# Patient Record
Sex: MALE | Race: WHITE | Employment: UNEMPLOYED | URBAN - METROPOLITAN AREA
[De-identification: names, ages, dates, MRNs, and addresses within clinical notes are randomized per-mention and may not be internally consistent; named-entity substitution may affect disease eponyms.]

---

## 2017-10-11 PROBLEM — I50.33 ACUTE ON CHRONIC DIASTOLIC HEART FAILURE (HCC): Status: ACTIVE | Noted: 2017-10-11

## 2018-06-20 ENCOUNTER — OFFICE VISIT (OUTPATIENT)
Dept: CARDIOLOGY CLINIC | Age: 68
End: 2018-06-20
Payer: COMMERCIAL

## 2018-06-20 VITALS
BODY MASS INDEX: 31.22 KG/M2 | RESPIRATION RATE: 16 BRPM | HEART RATE: 69 BPM | DIASTOLIC BLOOD PRESSURE: 78 MMHG | SYSTOLIC BLOOD PRESSURE: 134 MMHG | WEIGHT: 223 LBS | HEIGHT: 71 IN

## 2018-06-20 DIAGNOSIS — I50.33 ACUTE ON CHRONIC DIASTOLIC HEART FAILURE (HCC): ICD-10-CM

## 2018-06-20 DIAGNOSIS — Z95.1 HISTORY OF CORONARY ARTERY BYPASS SURGERY: ICD-10-CM

## 2018-06-20 DIAGNOSIS — I25.10 CORONARY ARTERY DISEASE INVOLVING NATIVE CORONARY ARTERY OF NATIVE HEART WITHOUT ANGINA PECTORIS: Primary | ICD-10-CM

## 2018-06-20 PROCEDURE — 93000 ELECTROCARDIOGRAM COMPLETE: CPT | Performed by: INTERNAL MEDICINE

## 2018-06-20 PROCEDURE — 99213 OFFICE O/P EST LOW 20 MIN: CPT | Performed by: INTERNAL MEDICINE

## 2018-06-20 RX ORDER — METOPROLOL SUCCINATE 25 MG/1
25 TABLET, EXTENDED RELEASE ORAL DAILY
Qty: 30 TABLET | Refills: 5
Start: 2018-06-20 | End: 2019-07-01 | Stop reason: SDUPTHER

## 2019-03-15 LAB
CHOLESTEROL, TOTAL: 134 MG/DL
CHOLESTEROL/HDL RATIO: 4.6
HDLC SERPL-MCNC: 29 MG/DL (ref 35–70)
LDL CHOLESTEROL CALCULATED: 78 MG/DL (ref 0–160)
TRIGL SERPL-MCNC: 177 MG/DL
VLDLC SERPL CALC-MCNC: 104 MG/DL

## 2019-06-10 RX ORDER — FERROUS SULFATE 325(65) MG
325 TABLET ORAL EVERY OTHER DAY
COMMUNITY
End: 2021-06-03

## 2019-06-10 RX ORDER — QUETIAPINE FUMARATE 25 MG/1
25 TABLET, FILM COATED ORAL DAILY PRN
COMMUNITY
End: 2019-07-01 | Stop reason: SDUPTHER

## 2019-06-10 RX ORDER — ASCORBIC ACID 500 MG
500 TABLET ORAL DAILY
COMMUNITY

## 2019-06-24 ENCOUNTER — PROCEDURE VISIT (OUTPATIENT)
Dept: PODIATRY | Age: 69
End: 2019-06-24
Payer: COMMERCIAL

## 2019-06-24 VITALS — SYSTOLIC BLOOD PRESSURE: 145 MMHG | DIASTOLIC BLOOD PRESSURE: 85 MMHG | TEMPERATURE: 97.4 F

## 2019-06-24 DIAGNOSIS — M79.675 PAIN IN LEFT TOE(S): ICD-10-CM

## 2019-06-24 DIAGNOSIS — E11.9 TYPE 2 DIABETES MELLITUS WITHOUT COMPLICATION, WITH LONG-TERM CURRENT USE OF INSULIN (HCC): ICD-10-CM

## 2019-06-24 DIAGNOSIS — I73.9 PERIPHERAL VASCULAR DISEASE, UNSPECIFIED (HCC): ICD-10-CM

## 2019-06-24 DIAGNOSIS — Z79.4 TYPE 2 DIABETES MELLITUS WITHOUT COMPLICATION, WITH LONG-TERM CURRENT USE OF INSULIN (HCC): ICD-10-CM

## 2019-06-24 DIAGNOSIS — M79.674 PAIN IN TOE OF RIGHT FOOT: ICD-10-CM

## 2019-06-24 DIAGNOSIS — B35.1 TINEA UNGUIUM: Primary | ICD-10-CM

## 2019-06-24 PROCEDURE — 11721 DEBRIDE NAIL 6 OR MORE: CPT | Performed by: PODIATRIST

## 2019-06-24 ASSESSMENT — ENCOUNTER SYMPTOMS
EYES NEGATIVE: 1
RESPIRATORY NEGATIVE: 1

## 2019-06-24 NOTE — PROGRESS NOTES
[address] [place] [address]  Dept: [phone]  Dept Fax: [phone]    DIABETIC NAIL PROGRESS NOTE  Date of patient's visit: 6/24/2019  Patient's Name:  Regan Escobedo YOB: 1950            Patient Care Team:  Cesar Olivares DO as PCP - General (Family Medicine)  Cesar Olivares DO as PCP - [facility] Empaneled Provider          Chief Complaint   Patient presents with    Diabetes     dm nail care saw PCP Dr. Lai Aj on 3/14/2019       Subjective: Regan Escobedo comes to clinic for Diabetes (dm nail care saw PCP Dr. Lai Aj on 3/14/2019)    he is a diabetic and states that diabetic foot exam .  Pt currently has complaint of thickened, elongated nails that they cannot manage by themselves. Pt's primary care physician is Cesar Olivares DO last seen   . Lab Results   Component Value Date    LABA1C 7.3 (H) 11/19/2016      Complains of numbness in the feet bilat.   Past Medical History:   Diagnosis Date    CAD (coronary artery disease)     Diabetes mellitus ([facility] [address]     GERD (gastroesophageal reflux disease) 11/18/2016    Hyperlipidemia     Hypertension     Obesity        Allergies   Allergen Reactions    Cleocin [Clindamycin Phosphate]      c.diff    Cymbalta [Duloxetine Hcl]     Lactose Intolerance (Gi)      Current Outpatient Medications on File Prior to Visit   Medication Sig Dispense Refill    Dulaglutide (TRULICITY) 1.5 DAMION/0.7QT SOPN Inject 1.5 mg into the skin      QUEtiapine (SEROQUEL) 25 MG tablet Take 25 mg by mouth daily as needed      vitamin C (ASCORBIC ACID) 500 MG tablet Take 500 mg by mouth daily      ferrous sulfate 325 (65 Fe) MG tablet Take 325 mg by mouth every other day      metoprolol succinate (TOPROL XL) 25 MG extended release tablet Take 1 tablet by mouth daily 30 tablet 5    Insulin Aspart (NOVOLOG SC) Inject into the skin      loratadine (CLARITIN) 10 MG tablet Take 10 mg by mouth      pantoprazole (PROTONIX) 40 MG tablet Take 40 mg by mouth      atorvastatin (LIPITOR) 40 MG tablet Take 1 tablet by mouth daily 30 tablet 5    amLODIPine (NORVASC) 5 MG tablet Take 1 tablet by mouth daily 30 tablet 5    LEVEMIR FLEXTOUCH 100 UNIT/ML injection pen Inject into the skin nightly       aspirin 81 MG chewable tablet Take 81 mg by mouth daily      insulin glargine (LANTUS) 100 UNIT/ML injection vial Inject 35 Units into the skin daily      LYRICA 75 MG capsule   0    pregabalin (LYRICA) 50 MG capsule Take 50 mg by mouth 2 times daily       No current facility-administered medications on file prior to visit. Review of Systems   Constitutional: Negative. HENT: Negative. Eyes: Negative. Respiratory: Negative. Cardiovascular: Negative. Endocrine: Negative. Genitourinary: Negative. Review of Systems:   History obtained from chart review and the patient  General ROS: negative for - chills, fatigue, fever, night sweats or weight gain  Constitutional: Negative for chills, diaphoresis, fatigue, fever and unexpected weight change. Musculoskeletal: Positive for arthralgias, gait problem and joint swelling. Neurological ROS: negative for - behavioral changes, confusion, headaches or seizures. Negative for weakness and numbness. Dermatological ROS: negative for - mole changes, rash  Cardiovascular: Negative for leg swelling. Gastrointestinal: Negative for constipation, diarrhea, nausea and vomiting. Objective:  General: AAO x 3 in NAD.     Derm  Toenail Description nails are thick and mycotic yellow incurvated causing pain with shoe gear  Sites of Onychomycosis Involvement (Check affected area)  [x] [x] [x] [x] [x] [x] [x] [x] [x] [x]  5 4 3 2 1 1 2 3 4 5                          Right                                        Left    Thickness  [x] [x] [x] [x] [x] [x] [x] [x] [x] [x]  5 4 3 2 1 1 2 3 4 5                         Right alert and oriented to person, place, and time       Right Foot/Ankle     Inspection and Palpation  Skin Exam: skin changes and abnormal color; (There is loss of hair to lower extremity mild discoloration to the lower extremity coolness to touch to the toes nails are thick and yellow dystrophic)    Neurovascular  Dorsalis pedis: 2+  Posterior tibial: absent      Left Foot/Ankle      Inspection and Palpation  Skin Exam: skin changes; Neurovascular  Dorsalis pedis: 2+  Posterior tibial: absent             Ortho Exam      Assessment:  68 y.o. male with:  1. Tinea unguium    2. Type 2 diabetes mellitus without complication, with long-term current use of insulin (HCC)    3. Pain in left toe(s)    4. Pain in toe of right foot    5. Peripheral vascular disease, unspecified ([facility] [address]     No orders of the defined types were placed in this encounter. Q7   []Yes    []No                Q8   [x]Yes    []No                     Q9   []Yes    []No    Plan:   Pt was evaluated and examined. Patient was given personalized discharge instructions. Nails 1-10 were debrided sharply in length and thickness with a nipper and , without incident. Pt will follow up in 3 months or sooner if any problems arise. Diagnosis was discussed with the pt and all of their questions were answered in detail. Proper foot hygiene and care was discussed with the pt. Informed patient on proper diabetic foot care and importance of tight glycemic control. Patient to check feet daily and contact the office with any questions/problems/concerns.    Other comorbidity noted and will be managed by PCP.  6/24/2019    Electronically signed by Demetri Lopes DPM on 6/24/2019 at 2:42 PM  6/24/2019

## 2019-07-01 ENCOUNTER — OFFICE VISIT (OUTPATIENT)
Dept: FAMILY MEDICINE CLINIC | Age: 69
End: 2019-07-01
Payer: COMMERCIAL

## 2019-07-01 ENCOUNTER — HOSPITAL ENCOUNTER (OUTPATIENT)
Age: 69
Discharge: HOME | End: 2019-07-03
Payer: COMMERCIAL

## 2019-07-01 VITALS
HEART RATE: 72 BPM | TEMPERATURE: 98.5 F | OXYGEN SATURATION: 95 % | HEIGHT: 71 IN | BODY MASS INDEX: 31.5 KG/M2 | SYSTOLIC BLOOD PRESSURE: 134 MMHG | WEIGHT: 225 LBS | DIASTOLIC BLOOD PRESSURE: 80 MMHG

## 2019-07-01 DIAGNOSIS — Z79.4 TYPE 2 DIABETES MELLITUS WITH HYPERGLYCEMIA, WITH LONG-TERM CURRENT USE OF INSULIN (HCC): Primary | ICD-10-CM

## 2019-07-01 DIAGNOSIS — R19.7 DIARRHEA OF PRESUMED INFECTIOUS ORIGIN: ICD-10-CM

## 2019-07-01 DIAGNOSIS — E11.65 TYPE 2 DIABETES MELLITUS WITH HYPERGLYCEMIA, WITH LONG-TERM CURRENT USE OF INSULIN (HCC): Primary | ICD-10-CM

## 2019-07-01 DIAGNOSIS — Z79.4 TYPE 2 DIABETES MELLITUS WITH DIABETIC POLYNEUROPATHY, WITH LONG-TERM CURRENT USE OF INSULIN (HCC): ICD-10-CM

## 2019-07-01 DIAGNOSIS — E11.42 TYPE 2 DIABETES MELLITUS WITH DIABETIC POLYNEUROPATHY, WITH LONG-TERM CURRENT USE OF INSULIN (HCC): ICD-10-CM

## 2019-07-01 LAB
ALBUMIN SERPL-MCNC: 4.4 G/DL (ref 3.5–5.2)
ALP BLD-CCNC: 88 U/L (ref 40–129)
ALT SERPL-CCNC: 25 U/L (ref 0–40)
ANION GAP SERPL CALCULATED.3IONS-SCNC: 17 MMOL/L (ref 7–16)
AST SERPL-CCNC: 19 U/L (ref 0–39)
BASOPHILS ABSOLUTE: 0.08 E9/L (ref 0–0.2)
BASOPHILS RELATIVE PERCENT: 1 % (ref 0–2)
BILIRUB SERPL-MCNC: 0.3 MG/DL (ref 0–1.2)
BUN BLDV-MCNC: 19 MG/DL (ref 8–23)
CALCIUM SERPL-MCNC: 9.8 MG/DL (ref 8.6–10.2)
CHLORIDE BLD-SCNC: 104 MMOL/L (ref 98–107)
CO2: 22 MMOL/L (ref 22–29)
CREAT SERPL-MCNC: 1.9 MG/DL (ref 0.7–1.2)
CREATININE URINE: 225 MG/DL (ref 40–278)
EOSINOPHILS ABSOLUTE: 0.35 E9/L (ref 0.05–0.5)
EOSINOPHILS RELATIVE PERCENT: 4.4 % (ref 0–6)
GFR AFRICAN AMERICAN: 42
GFR NON-AFRICAN AMERICAN: 35 ML/MIN/1.73
GLUCOSE BLD-MCNC: 212 MG/DL (ref 74–99)
HBA1C MFR BLD: 9.4 % (ref 4–5.6)
HCT VFR BLD CALC: 47.3 % (ref 37–54)
HEMOGLOBIN: 15 G/DL (ref 12.5–16.5)
IMMATURE GRANULOCYTES #: 0.03 E9/L
IMMATURE GRANULOCYTES %: 0.4 % (ref 0–5)
LYMPHOCYTES ABSOLUTE: 1.69 E9/L (ref 1.5–4)
LYMPHOCYTES RELATIVE PERCENT: 21.4 % (ref 20–42)
MCH RBC QN AUTO: 31.3 PG (ref 26–35)
MCHC RBC AUTO-ENTMCNC: 31.7 % (ref 32–34.5)
MCV RBC AUTO: 98.5 FL (ref 80–99.9)
MICROALBUMIN UR-MCNC: 63.1 MG/L
MICROALBUMIN/CREAT UR-RTO: 28 (ref 0–30)
MONOCYTES ABSOLUTE: 0.62 E9/L (ref 0.1–0.95)
MONOCYTES RELATIVE PERCENT: 7.9 % (ref 2–12)
NEUTROPHILS ABSOLUTE: 5.11 E9/L (ref 1.8–7.3)
NEUTROPHILS RELATIVE PERCENT: 64.9 % (ref 43–80)
PDW BLD-RTO: 13.2 FL (ref 11.5–15)
PLATELET # BLD: 252 E9/L (ref 130–450)
PMV BLD AUTO: 11.1 FL (ref 7–12)
POTASSIUM SERPL-SCNC: 5.9 MMOL/L (ref 3.5–5)
RBC # BLD: 4.8 E12/L (ref 3.8–5.8)
SODIUM BLD-SCNC: 143 MMOL/L (ref 132–146)
TOTAL PROTEIN: 8 G/DL (ref 6.4–8.3)
WBC # BLD: 7.9 E9/L (ref 4.5–11.5)

## 2019-07-01 PROCEDURE — 85025 COMPLETE CBC W/AUTO DIFF WBC: CPT

## 2019-07-01 PROCEDURE — 87045 FECES CULTURE AEROBIC BACT: CPT

## 2019-07-01 PROCEDURE — 82044 UR ALBUMIN SEMIQUANTITATIVE: CPT

## 2019-07-01 PROCEDURE — 80053 COMPREHEN METABOLIC PANEL: CPT

## 2019-07-01 PROCEDURE — 82570 ASSAY OF URINE CREATININE: CPT

## 2019-07-01 PROCEDURE — 36415 COLL VENOUS BLD VENIPUNCTURE: CPT

## 2019-07-01 PROCEDURE — 83036 HEMOGLOBIN GLYCOSYLATED A1C: CPT

## 2019-07-01 PROCEDURE — 99214 OFFICE O/P EST MOD 30 MIN: CPT | Performed by: FAMILY MEDICINE

## 2019-07-01 RX ORDER — AMLODIPINE BESYLATE 5 MG/1
5 TABLET ORAL DAILY
Qty: 90 TABLET | Refills: 1 | Status: SHIPPED | OUTPATIENT
Start: 2019-07-01 | End: 2019-12-23 | Stop reason: SDUPTHER

## 2019-07-01 RX ORDER — QUETIAPINE FUMARATE 25 MG/1
25 TABLET, FILM COATED ORAL DAILY PRN
Qty: 90 TABLET | Refills: 1 | Status: SHIPPED | OUTPATIENT
Start: 2019-07-01 | End: 2019-12-23 | Stop reason: SDUPTHER

## 2019-07-01 RX ORDER — PANTOPRAZOLE SODIUM 40 MG/1
40 TABLET, DELAYED RELEASE ORAL DAILY
Qty: 90 TABLET | Refills: 1 | Status: SHIPPED | OUTPATIENT
Start: 2019-07-01 | End: 2019-12-23 | Stop reason: SDUPTHER

## 2019-07-01 RX ORDER — METOPROLOL SUCCINATE 25 MG/1
25 TABLET, EXTENDED RELEASE ORAL DAILY
Qty: 90 TABLET | Refills: 1 | Status: SHIPPED | OUTPATIENT
Start: 2019-07-01 | End: 2019-12-23 | Stop reason: SDUPTHER

## 2019-07-01 RX ORDER — ATORVASTATIN CALCIUM 40 MG/1
40 TABLET, FILM COATED ORAL DAILY
Qty: 90 TABLET | Refills: 1 | Status: SHIPPED | OUTPATIENT
Start: 2019-07-01 | End: 2019-12-23 | Stop reason: SDUPTHER

## 2019-07-03 LAB — CULTURE, STOOL: NORMAL

## 2019-08-26 ENCOUNTER — PROCEDURE VISIT (OUTPATIENT)
Dept: PODIATRY | Age: 69
End: 2019-08-26
Payer: COMMERCIAL

## 2019-08-26 VITALS
DIASTOLIC BLOOD PRESSURE: 84 MMHG | WEIGHT: 224 LBS | HEIGHT: 71 IN | SYSTOLIC BLOOD PRESSURE: 144 MMHG | TEMPERATURE: 97.4 F | BODY MASS INDEX: 31.36 KG/M2

## 2019-08-26 DIAGNOSIS — I73.9 PERIPHERAL VASCULAR DISEASE, UNSPECIFIED (HCC): ICD-10-CM

## 2019-08-26 DIAGNOSIS — M79.674 PAIN IN TOE OF RIGHT FOOT: ICD-10-CM

## 2019-08-26 DIAGNOSIS — M79.675 PAIN IN LEFT TOE(S): ICD-10-CM

## 2019-08-26 DIAGNOSIS — E11.9 TYPE 2 DIABETES MELLITUS WITHOUT COMPLICATION, WITH LONG-TERM CURRENT USE OF INSULIN (HCC): ICD-10-CM

## 2019-08-26 DIAGNOSIS — B35.1 TINEA UNGUIUM: Primary | ICD-10-CM

## 2019-08-26 DIAGNOSIS — Z79.4 TYPE 2 DIABETES MELLITUS WITHOUT COMPLICATION, WITH LONG-TERM CURRENT USE OF INSULIN (HCC): ICD-10-CM

## 2019-08-26 PROCEDURE — 11721 DEBRIDE NAIL 6 OR MORE: CPT | Performed by: PODIATRIST

## 2019-08-26 ASSESSMENT — ENCOUNTER SYMPTOMS
EYES NEGATIVE: 1
RESPIRATORY NEGATIVE: 1

## 2019-08-26 NOTE — PROGRESS NOTES
[address] [place] [address]  Dept: [phone]  Dept Fax: [phone]    DIABETIC NAIL PROGRESS NOTE  Date of patient's visit: 8/26/2019  Patient's Name:  Romina Mcadams YOB: 1950            Patient Care Team:  Despina Goltz, DO as PCP - General (Family Medicine)  Despina Goltz, DO as PCP - [facility] Empaneled Provider          Chief Complaint   Patient presents with    Diabetes     dm nail care saw PCP Dr. Mi Nava on 7/1/2019       Subjective: Romina Mcadams comes to clinic for Diabetes (dm nail care saw PCP Dr. Mi Nava on 7/1/2019)    he is a diabetic and states that diabetic foot exam .  Pt currently has complaint of thickened, elongated nails that they cannot manage by themselves. Pt's primary care physician is Despina Goltz, DO last seen   . Lab Results   Component Value Date    LABA1C 9.4 (H) 07/01/2019      Complains of numbness in the feet bilat. Past Medical History:   Diagnosis Date    CAD (coronary artery disease)     Diabetes mellitus ([facility] [address]     GERD (gastroesophageal reflux disease) 11/18/2016    Hyperlipidemia     Hypertension     Obesity        Allergies   Allergen Reactions    Cleocin [Clindamycin Phosphate]      c.diff    Cymbalta [Duloxetine Hcl]     Lactose Intolerance (Gi)      Current Outpatient Medications on File Prior to Visit   Medication Sig Dispense Refill    amLODIPine (NORVASC) 5 MG tablet Take 1 tablet by mouth daily 90 tablet 1    atorvastatin (LIPITOR) 40 MG tablet Take 1 tablet by mouth daily 90 tablet 1    metoprolol succinate (TOPROL XL) 25 MG extended release tablet Take 1 tablet by mouth daily 90 tablet 1    LYRICA 75 MG capsule Take 1 capsule by mouth 2 times daily for 90 days.  60 capsule 2    QUEtiapine (SEROQUEL) 25 MG tablet Take 1 tablet by mouth daily as needed for Other (insomnia) 90 tablet 1    pantoprazole (PROTONIX) 40 MG tablet Take 1 tablet by mouth daily 90 tablet 1    Dulaglutide (TRULICITY) 1.5 SO/6.1BN SOPN Inject 1.5 mg into the skin      vitamin C (ASCORBIC ACID) 500 MG tablet Take 500 mg by mouth daily      ferrous sulfate 325 (65 Fe) MG tablet Take 325 mg by mouth every other day      Insulin Aspart (NOVOLOG SC) Inject 12 Units into the skin 3 times daily (before meals)       loratadine (CLARITIN) 10 MG tablet Take 10 mg by mouth      LEVEMIR FLEXTOUCH 100 UNIT/ML injection pen Inject 42 Units into the skin nightly       aspirin 81 MG chewable tablet Take 81 mg by mouth daily      pregabalin (LYRICA) 50 MG capsule Take 50 mg by mouth 2 times daily      insulin glargine (LANTUS) 100 UNIT/ML injection vial Inject 35 Units into the skin daily       No current facility-administered medications on file prior to visit. Review of Systems   Constitutional: Negative. HENT: Negative. Eyes: Negative. Respiratory: Negative. Cardiovascular: Negative. Endocrine: Negative. Genitourinary: Negative. Review of Systems:   History obtained from chart review and the patient  General ROS: negative for - chills, fatigue, fever, night sweats or weight gain  Constitutional: Negative for chills, diaphoresis, fatigue, fever and unexpected weight change. Musculoskeletal: Positive for arthralgias, gait problem and joint swelling. Neurological ROS: negative for - behavioral changes, confusion, headaches or seizures. Negative for weakness and numbness. Dermatological ROS: negative for - mole changes, rash  Cardiovascular: Negative for leg swelling. Gastrointestinal: Negative for constipation, diarrhea, nausea and vomiting. Objective:  General: AAO x 3 in NAD.     Derm  Toenail Description nails are thick and mycotic yellow incurvated causing pain with shoe gear  Sites of Onychomycosis Involvement (Check affected area)  [x] [x] [x] [x] [x] [x] [x] [x] [x] [x]  5 4 3 2 1 1 2 3 4 5                          Right Left    Thickness  [x] [x] [x] [x] [x] [x] [x] [x] [x] [x]  5 4 3 2 1 1 2 3 4 5                         Right                                        Left    Dystrophic Changes   [x] [x] [x] [x] [x] [x] [x] [x] [x] [x]  5 4 3 2 1 1 2 3 4 5                         Right                                        Left    Color   [x] [x] [x] [x] [x] [x] [x] [x] [x] [x]  5 4 3 2 1 1 2 3 4 5                          Right                                        Left    Incurvation/Ingrowin   [x] [x] [x] [x] [x] [x] [x] [x] [x] [x]  5 4 3 2 1 1 2 3 4 5                         Right                                        Left    Inflammation/Pain   [x] [x] [x] [x] [x] [x] [x] [x] [x] [x]  5 4 3 2 1 1 2 3 4 5                         Right                                        Left      Dermatologic Exam:  Skin lesion/ulceration . Skin   Loss of hair  lower extremity      Musculoskeletal:     1st MPJ ROM decreased, Bilateral.  Muscle Bilateral.  Pain present upon palpation of toenails 1-5, Bilateral. decreased medial longitudinal arch, Bilateral.  Ankle ROM decreased,Bilateral.    Dorsally contracted digits     Vascular: DP and PT pulses absnet  CFT < seconds, Bilateral.  Hair growth absent to the level of the digits, Bilateral.  Edema  Bilateral.  Varicosities  Bilateral.     Neurological: Sensation diminshed to light touch to level of digits, Bilateral.  Protective sensation  sites via 5.07/10g [place] Monofilament, Bilateral.  negative Tinel's, Bilateral.  negative Valleix sign, Bilateral.      Integument: nails   thickened > 3.0 mm, dystrophic and crumbly, discolored with subungual debris.     Toes cool to touch    Visual inspection:  Deformity: hammertoe deformity rachael feet  amputation: absent    Edema:   Sensory exam:  Monofilament sensation: abnormal - 6/10 via SW 5.07/10g monofilament to the plantar foot bilateral feet    Pulses:     Pinprick: Impaired  Proprioception:

## 2019-09-30 ENCOUNTER — OFFICE VISIT (OUTPATIENT)
Dept: FAMILY MEDICINE CLINIC | Age: 69
End: 2019-09-30
Payer: COMMERCIAL

## 2019-09-30 ENCOUNTER — HOSPITAL ENCOUNTER (OUTPATIENT)
Age: 69
Discharge: HOME | End: 2019-10-02
Payer: COMMERCIAL

## 2019-09-30 VITALS
BODY MASS INDEX: 31.38 KG/M2 | DIASTOLIC BLOOD PRESSURE: 84 MMHG | HEART RATE: 74 BPM | SYSTOLIC BLOOD PRESSURE: 138 MMHG | TEMPERATURE: 97.5 F | OXYGEN SATURATION: 98 % | WEIGHT: 225 LBS

## 2019-09-30 DIAGNOSIS — Z12.5 ENCOUNTER FOR SCREENING FOR MALIGNANT NEOPLASM OF PROSTATE: ICD-10-CM

## 2019-09-30 DIAGNOSIS — E03.9 HYPOTHYROIDISM, UNSPECIFIED TYPE: ICD-10-CM

## 2019-09-30 DIAGNOSIS — E55.9 VITAMIN D DEFICIENCY: ICD-10-CM

## 2019-09-30 DIAGNOSIS — I10 ESSENTIAL HYPERTENSION: ICD-10-CM

## 2019-09-30 DIAGNOSIS — E78.5 HYPERLIPIDEMIA, UNSPECIFIED HYPERLIPIDEMIA TYPE: ICD-10-CM

## 2019-09-30 DIAGNOSIS — E11.9 TYPE 2 DIABETES MELLITUS WITHOUT COMPLICATION, WITH LONG-TERM CURRENT USE OF INSULIN (HCC): ICD-10-CM

## 2019-09-30 DIAGNOSIS — E11.9 TYPE 2 DIABETES MELLITUS WITHOUT COMPLICATION, WITH LONG-TERM CURRENT USE OF INSULIN (HCC): Primary | ICD-10-CM

## 2019-09-30 DIAGNOSIS — Z79.4 TYPE 2 DIABETES MELLITUS WITHOUT COMPLICATION, WITH LONG-TERM CURRENT USE OF INSULIN (HCC): ICD-10-CM

## 2019-09-30 DIAGNOSIS — Z79.4 TYPE 2 DIABETES MELLITUS WITHOUT COMPLICATION, WITH LONG-TERM CURRENT USE OF INSULIN (HCC): Primary | ICD-10-CM

## 2019-09-30 DIAGNOSIS — E11.42 TYPE 2 DIABETES MELLITUS WITH DIABETIC POLYNEUROPATHY, WITH LONG-TERM CURRENT USE OF INSULIN (HCC): ICD-10-CM

## 2019-09-30 DIAGNOSIS — Z79.4 TYPE 2 DIABETES MELLITUS WITH DIABETIC POLYNEUROPATHY, WITH LONG-TERM CURRENT USE OF INSULIN (HCC): ICD-10-CM

## 2019-09-30 LAB
ALBUMIN SERPL-MCNC: 4.2 G/DL (ref 3.5–5.2)
ALP BLD-CCNC: 97 U/L (ref 40–129)
ALT SERPL-CCNC: 18 U/L (ref 0–40)
ANION GAP SERPL CALCULATED.3IONS-SCNC: 15 MMOL/L (ref 7–16)
AST SERPL-CCNC: 17 U/L (ref 0–39)
BASOPHILS ABSOLUTE: 0.07 E9/L (ref 0–0.2)
BASOPHILS RELATIVE PERCENT: 1.1 % (ref 0–2)
BILIRUB SERPL-MCNC: 0.5 MG/DL (ref 0–1.2)
BUN BLDV-MCNC: 18 MG/DL (ref 8–23)
CALCIUM SERPL-MCNC: 9.3 MG/DL (ref 8.6–10.2)
CHLORIDE BLD-SCNC: 101 MMOL/L (ref 98–107)
CHOLESTEROL, TOTAL: 123 MG/DL (ref 0–199)
CO2: 22 MMOL/L (ref 22–29)
CREAT SERPL-MCNC: 1.7 MG/DL (ref 0.7–1.2)
CREATININE URINE: 79 MG/DL (ref 40–278)
EOSINOPHILS ABSOLUTE: 0.33 E9/L (ref 0.05–0.5)
EOSINOPHILS RELATIVE PERCENT: 5 % (ref 0–6)
GFR AFRICAN AMERICAN: 48
GFR NON-AFRICAN AMERICAN: 40 ML/MIN/1.73
GLUCOSE BLD-MCNC: 228 MG/DL (ref 74–99)
HBA1C MFR BLD: 9.2 % (ref 4–5.6)
HCT VFR BLD CALC: 43.5 % (ref 37–54)
HDLC SERPL-MCNC: 32 MG/DL
HEMOGLOBIN: 14.4 G/DL (ref 12.5–16.5)
IMMATURE GRANULOCYTES #: 0.02 E9/L
IMMATURE GRANULOCYTES %: 0.3 % (ref 0–5)
LDL CHOLESTEROL CALCULATED: 62 MG/DL (ref 0–99)
LYMPHOCYTES ABSOLUTE: 1.27 E9/L (ref 1.5–4)
LYMPHOCYTES RELATIVE PERCENT: 19.2 % (ref 20–42)
MCH RBC QN AUTO: 31.2 PG (ref 26–35)
MCHC RBC AUTO-ENTMCNC: 33.1 % (ref 32–34.5)
MCV RBC AUTO: 94.2 FL (ref 80–99.9)
MICROALBUMIN UR-MCNC: 20.4 MG/L
MICROALBUMIN/CREAT UR-RTO: 25.8 (ref 0–30)
MONOCYTES ABSOLUTE: 0.48 E9/L (ref 0.1–0.95)
MONOCYTES RELATIVE PERCENT: 7.3 % (ref 2–12)
NEUTROPHILS ABSOLUTE: 4.43 E9/L (ref 1.8–7.3)
NEUTROPHILS RELATIVE PERCENT: 67.1 % (ref 43–80)
PDW BLD-RTO: 12.9 FL (ref 11.5–15)
PLATELET # BLD: 223 E9/L (ref 130–450)
PMV BLD AUTO: 11.4 FL (ref 7–12)
POTASSIUM SERPL-SCNC: 5 MMOL/L (ref 3.5–5)
PROSTATE SPECIFIC ANTIGEN: 0.36 NG/ML (ref 0–4)
RBC # BLD: 4.62 E12/L (ref 3.8–5.8)
SODIUM BLD-SCNC: 138 MMOL/L (ref 132–146)
T4 FREE: 1.07 NG/DL (ref 0.93–1.7)
TOTAL CK: 108 U/L (ref 20–200)
TOTAL PROTEIN: 7.7 G/DL (ref 6.4–8.3)
TRIGL SERPL-MCNC: 147 MG/DL (ref 0–149)
TSH SERPL DL<=0.05 MIU/L-ACNC: 1.92 UIU/ML (ref 0.27–4.2)
URIC ACID, SERUM: 4.2 MG/DL (ref 3.4–7)
VITAMIN D 25-HYDROXY: 35 NG/ML (ref 30–100)
VLDLC SERPL CALC-MCNC: 29 MG/DL
WBC # BLD: 6.6 E9/L (ref 4.5–11.5)

## 2019-09-30 PROCEDURE — 84443 ASSAY THYROID STIM HORMONE: CPT

## 2019-09-30 PROCEDURE — 80053 COMPREHEN METABOLIC PANEL: CPT

## 2019-09-30 PROCEDURE — 85025 COMPLETE CBC W/AUTO DIFF WBC: CPT

## 2019-09-30 PROCEDURE — 82550 ASSAY OF CK (CPK): CPT

## 2019-09-30 PROCEDURE — 82570 ASSAY OF URINE CREATININE: CPT

## 2019-09-30 PROCEDURE — 84439 ASSAY OF FREE THYROXINE: CPT

## 2019-09-30 PROCEDURE — 82044 UR ALBUMIN SEMIQUANTITATIVE: CPT

## 2019-09-30 PROCEDURE — 83036 HEMOGLOBIN GLYCOSYLATED A1C: CPT

## 2019-09-30 PROCEDURE — 36415 COLL VENOUS BLD VENIPUNCTURE: CPT

## 2019-09-30 PROCEDURE — 82306 VITAMIN D 25 HYDROXY: CPT

## 2019-09-30 PROCEDURE — 80061 LIPID PANEL: CPT

## 2019-09-30 PROCEDURE — 99214 OFFICE O/P EST MOD 30 MIN: CPT | Performed by: FAMILY MEDICINE

## 2019-09-30 PROCEDURE — 84550 ASSAY OF BLOOD/URIC ACID: CPT

## 2019-09-30 PROCEDURE — G0103 PSA SCREENING: HCPCS

## 2019-09-30 ASSESSMENT — ENCOUNTER SYMPTOMS
COLOR CHANGE: 0
SINUS PAIN: 0
SORE THROAT: 0
NAUSEA: 0
DIARRHEA: 0
CONSTIPATION: 0
ABDOMINAL PAIN: 0
CHEST TIGHTNESS: 0
ALLERGIC/IMMUNOLOGIC NEGATIVE: 1
COUGH: 0
WHEEZING: 0
VOMITING: 0
EYE DISCHARGE: 0
SHORTNESS OF BREATH: 0
TROUBLE SWALLOWING: 0
BACK PAIN: 0
EYE PAIN: 0

## 2019-09-30 ASSESSMENT — PATIENT HEALTH QUESTIONNAIRE - PHQ9
SUM OF ALL RESPONSES TO PHQ QUESTIONS 1-9: 0
SUM OF ALL RESPONSES TO PHQ QUESTIONS 1-9: 0
2. FEELING DOWN, DEPRESSED OR HOPELESS: 0
SUM OF ALL RESPONSES TO PHQ9 QUESTIONS 1 & 2: 0
1. LITTLE INTEREST OR PLEASURE IN DOING THINGS: 0

## 2019-09-30 NOTE — PROGRESS NOTES
by mouth daily, Disp: 90 tablet, Rfl: 1    Dulaglutide (TRULICITY) 1.5 YE/0.4GM SOPN, Inject 1.5 mg into the skin, Disp: , Rfl:     vitamin C (ASCORBIC ACID) 500 MG tablet, Take 500 mg by mouth daily, Disp: , Rfl:     ferrous sulfate 325 (65 Fe) MG tablet, Take 325 mg by mouth every other day, Disp: , Rfl:     Insulin Aspart (NOVOLOG SC), Inject 12 Units into the skin 3 times daily (before meals) , Disp: , Rfl:     loratadine (CLARITIN) 10 MG tablet, Take 10 mg by mouth, Disp: , Rfl:     LEVEMIR FLEXTOUCH 100 UNIT/ML injection pen, Inject 42 Units into the skin nightly , Disp: , Rfl:     aspirin 81 MG chewable tablet, Take 81 mg by mouth daily, Disp: , Rfl:     pregabalin (LYRICA) 50 MG capsule, Take 50 mg by mouth 2 times daily, Disp: , Rfl:     insulin glargine (LANTUS) 100 UNIT/ML injection vial, Inject 35 Units into the skin daily, Disp: , Rfl:     LYRICA 75 MG capsule, Take 1 capsule by mouth 2 times daily for 90 days. , Disp: 60 capsule, Rfl: 2  Allergies   Allergen Reactions    Cleocin [Clindamycin Phosphate]      c.diff    Cymbalta [Duloxetine Hcl]     Lactose Intolerance (Gi)        Past Medical History:   Diagnosis Date    CAD (coronary artery disease)     Diabetes mellitus ([facility] [address]     GERD (gastroesophageal reflux disease) 11/18/2016    Hyperlipidemia     Hypertension     Obesity      Past Surgical History:   Procedure Laterality Date    COLONOSCOPY      CORONARY ARTERY BYPASS GRAFT  11/23/2016    x4    ENDOSCOPY, COLON, DIAGNOSTIC      SKIN CANCER EXCISION  08/22/2017    SCC BERNAT- SCC 2018 LEFT EAR/NOSE/LEFT ARM     Family History   Problem Relation Age of Onset    Alzheimer's Disease Mother     Heart Disease Mother     Diabetes Mother     Heart Disease Father     Diabetes Paternal Grandmother      Social History     Socioeconomic History    Marital status:      Spouse name: Not on file    Number of children: Not on file    Years of education: Not on file   Lenard Merritt of motion. Neurological: He is alert and oriented to person, place, and time. Vibratory sensation (128hz) diminished bilaterally. Skin: Skin is warm and dry. Psychiatric: He has a normal mood and affect. Vitals reviewed. POCT Orders   No Active POCT       Assessment and Plan:  Cristina Livingston was seen today for insomnia, anxiety and other. Diagnoses and all orders for this visit:    Type 2 diabetes mellitus without complication, with long-term current use of insulin (HCC)  -     Microalbumin / Creatinine Urine Ratio; Future  -     Comprehensive Metabolic Panel; Future  -     Hemoglobin A1C; Future    Essential hypertension  -     CBC Auto Differential; Future  -     Comprehensive Metabolic Panel; Future  -     Uric Acid; Future    Hypothyroidism, unspecified type  -     T4, Free; Future  -     TSH without Reflex; Future    Hyperlipidemia, unspecified hyperlipidemia type  -     CK; Future  -     Lipid Panel; Future    Vitamin D deficiency  -     Vitamin D 25 Hydroxy; Future    Encounter for screening for malignant neoplasm of prostate  -     Psa screening; Future    Reviewed Pmhx, meds, diet, oarrs. Recheck post labs. Get flushot at pharmacy. No follow-ups on file.      Seen By:  Fritz Duckworth DO

## 2019-10-17 RX ORDER — PEN NEEDLE, DIABETIC 31 GX5/16"
NEEDLE, DISPOSABLE MISCELLANEOUS
Qty: 300 EACH | Refills: 1 | Status: SHIPPED | OUTPATIENT
Start: 2019-10-17 | End: 2019-12-23 | Stop reason: SDUPTHER

## 2019-11-04 ENCOUNTER — PROCEDURE VISIT (OUTPATIENT)
Dept: PODIATRY | Age: 69
End: 2019-11-04
Payer: COMMERCIAL

## 2019-11-04 VITALS
WEIGHT: 220 LBS | HEART RATE: 76 BPM | HEIGHT: 71 IN | BODY MASS INDEX: 30.8 KG/M2 | OXYGEN SATURATION: 97 % | TEMPERATURE: 97 F

## 2019-11-04 DIAGNOSIS — M79.675 PAIN IN LEFT TOE(S): ICD-10-CM

## 2019-11-04 DIAGNOSIS — M79.674 PAIN IN TOE OF RIGHT FOOT: ICD-10-CM

## 2019-11-04 DIAGNOSIS — Z79.4 TYPE 2 DIABETES MELLITUS WITHOUT COMPLICATION, WITH LONG-TERM CURRENT USE OF INSULIN (HCC): ICD-10-CM

## 2019-11-04 DIAGNOSIS — E11.9 TYPE 2 DIABETES MELLITUS WITHOUT COMPLICATION, WITH LONG-TERM CURRENT USE OF INSULIN (HCC): ICD-10-CM

## 2019-11-04 DIAGNOSIS — I73.9 PERIPHERAL VASCULAR DISEASE, UNSPECIFIED (HCC): ICD-10-CM

## 2019-11-04 DIAGNOSIS — B35.1 TINEA UNGUIUM: Primary | ICD-10-CM

## 2019-11-04 PROCEDURE — 11721 DEBRIDE NAIL 6 OR MORE: CPT | Performed by: PODIATRIST

## 2019-11-04 ASSESSMENT — ENCOUNTER SYMPTOMS
RESPIRATORY NEGATIVE: 1
EYES NEGATIVE: 1

## 2019-12-23 ENCOUNTER — OFFICE VISIT (OUTPATIENT)
Dept: FAMILY MEDICINE CLINIC | Age: 69
End: 2019-12-23
Payer: COMMERCIAL

## 2019-12-23 ENCOUNTER — HOSPITAL ENCOUNTER (OUTPATIENT)
Age: 69
Discharge: HOME | End: 2019-12-25
Payer: COMMERCIAL

## 2019-12-23 VITALS
TEMPERATURE: 97.5 F | BODY MASS INDEX: 31.64 KG/M2 | OXYGEN SATURATION: 98 % | HEART RATE: 83 BPM | DIASTOLIC BLOOD PRESSURE: 80 MMHG | SYSTOLIC BLOOD PRESSURE: 136 MMHG | HEIGHT: 71 IN | WEIGHT: 226 LBS

## 2019-12-23 DIAGNOSIS — E11.42 TYPE 2 DIABETES MELLITUS WITH DIABETIC POLYNEUROPATHY, WITH LONG-TERM CURRENT USE OF INSULIN (HCC): ICD-10-CM

## 2019-12-23 DIAGNOSIS — E78.5 HYPERLIPIDEMIA, UNSPECIFIED HYPERLIPIDEMIA TYPE: ICD-10-CM

## 2019-12-23 DIAGNOSIS — I10 ESSENTIAL HYPERTENSION: Primary | ICD-10-CM

## 2019-12-23 DIAGNOSIS — Z79.4 TYPE 2 DIABETES MELLITUS WITH DIABETIC POLYNEUROPATHY, WITH LONG-TERM CURRENT USE OF INSULIN (HCC): ICD-10-CM

## 2019-12-23 LAB
ALBUMIN SERPL-MCNC: 4.1 G/DL (ref 3.5–5.2)
ALP BLD-CCNC: 96 U/L (ref 40–129)
ALT SERPL-CCNC: 22 U/L (ref 0–40)
ANION GAP SERPL CALCULATED.3IONS-SCNC: 13 MMOL/L (ref 7–16)
AST SERPL-CCNC: 19 U/L (ref 0–39)
BASOPHILS ABSOLUTE: 0.08 E9/L (ref 0–0.2)
BASOPHILS RELATIVE PERCENT: 1.2 % (ref 0–2)
BILIRUB SERPL-MCNC: 0.3 MG/DL (ref 0–1.2)
BUN BLDV-MCNC: 20 MG/DL (ref 8–23)
CALCIUM SERPL-MCNC: 9.6 MG/DL (ref 8.6–10.2)
CHLORIDE BLD-SCNC: 102 MMOL/L (ref 98–107)
CO2: 23 MMOL/L (ref 22–29)
CREAT SERPL-MCNC: 1.7 MG/DL (ref 0.7–1.2)
EOSINOPHILS ABSOLUTE: 0.38 E9/L (ref 0.05–0.5)
EOSINOPHILS RELATIVE PERCENT: 5.8 % (ref 0–6)
GFR AFRICAN AMERICAN: 48
GFR NON-AFRICAN AMERICAN: 40 ML/MIN/1.73
GLUCOSE BLD-MCNC: 301 MG/DL (ref 74–99)
HBA1C MFR BLD: 9.6 % (ref 4–5.6)
HCT VFR BLD CALC: 46.9 % (ref 37–54)
HEMOGLOBIN: 14.7 G/DL (ref 12.5–16.5)
IMMATURE GRANULOCYTES #: 0.02 E9/L
IMMATURE GRANULOCYTES %: 0.3 % (ref 0–5)
LYMPHOCYTES ABSOLUTE: 1.43 E9/L (ref 1.5–4)
LYMPHOCYTES RELATIVE PERCENT: 21.7 % (ref 20–42)
MCH RBC QN AUTO: 30.2 PG (ref 26–35)
MCHC RBC AUTO-ENTMCNC: 31.3 % (ref 32–34.5)
MCV RBC AUTO: 96.5 FL (ref 80–99.9)
MONOCYTES ABSOLUTE: 0.53 E9/L (ref 0.1–0.95)
MONOCYTES RELATIVE PERCENT: 8.1 % (ref 2–12)
NEUTROPHILS ABSOLUTE: 4.14 E9/L (ref 1.8–7.3)
NEUTROPHILS RELATIVE PERCENT: 62.9 % (ref 43–80)
PDW BLD-RTO: 13 FL (ref 11.5–15)
PLATELET # BLD: 212 E9/L (ref 130–450)
PMV BLD AUTO: 11.4 FL (ref 7–12)
POTASSIUM SERPL-SCNC: 6.1 MMOL/L (ref 3.5–5)
RBC # BLD: 4.86 E12/L (ref 3.8–5.8)
SODIUM BLD-SCNC: 138 MMOL/L (ref 132–146)
TOTAL PROTEIN: 7.8 G/DL (ref 6.4–8.3)
WBC # BLD: 6.6 E9/L (ref 4.5–11.5)

## 2019-12-23 PROCEDURE — 85025 COMPLETE CBC W/AUTO DIFF WBC: CPT

## 2019-12-23 PROCEDURE — 83036 HEMOGLOBIN GLYCOSYLATED A1C: CPT

## 2019-12-23 PROCEDURE — 36415 COLL VENOUS BLD VENIPUNCTURE: CPT

## 2019-12-23 PROCEDURE — 99214 OFFICE O/P EST MOD 30 MIN: CPT | Performed by: FAMILY MEDICINE

## 2019-12-23 PROCEDURE — 80053 COMPREHEN METABOLIC PANEL: CPT

## 2019-12-23 RX ORDER — METOPROLOL SUCCINATE 25 MG/1
25 TABLET, EXTENDED RELEASE ORAL DAILY
Qty: 90 TABLET | Refills: 1 | Status: SHIPPED
Start: 2019-12-23 | End: 2020-08-05 | Stop reason: SDUPTHER

## 2019-12-23 RX ORDER — QUETIAPINE FUMARATE 25 MG/1
25 TABLET, FILM COATED ORAL DAILY PRN
Qty: 90 TABLET | Refills: 1 | Status: SHIPPED
Start: 2019-12-23 | End: 2020-08-05 | Stop reason: SDUPTHER

## 2019-12-23 RX ORDER — AMLODIPINE BESYLATE 5 MG/1
5 TABLET ORAL DAILY
Qty: 90 TABLET | Refills: 1 | Status: SHIPPED
Start: 2019-12-23 | End: 2020-08-04

## 2019-12-23 RX ORDER — ATORVASTATIN CALCIUM 40 MG/1
40 TABLET, FILM COATED ORAL DAILY
Qty: 90 TABLET | Refills: 1 | Status: SHIPPED
Start: 2019-12-23 | End: 2020-06-09 | Stop reason: SDUPTHER

## 2019-12-23 RX ORDER — PANTOPRAZOLE SODIUM 40 MG/1
40 TABLET, DELAYED RELEASE ORAL DAILY
Qty: 90 TABLET | Refills: 1 | Status: SHIPPED
Start: 2019-12-23 | End: 2020-04-01 | Stop reason: SDUPTHER

## 2019-12-23 ASSESSMENT — ENCOUNTER SYMPTOMS
ALLERGIC/IMMUNOLOGIC NEGATIVE: 1
SINUS PAIN: 0
SHORTNESS OF BREATH: 0
EYE DISCHARGE: 0
BACK PAIN: 0
EYE PAIN: 0
WHEEZING: 0
ABDOMINAL PAIN: 0
CHEST TIGHTNESS: 0
SORE THROAT: 0
COLOR CHANGE: 0
DIARRHEA: 0
CONSTIPATION: 0
VOMITING: 0
NAUSEA: 0
TROUBLE SWALLOWING: 0
COUGH: 0

## 2020-01-06 ENCOUNTER — PROCEDURE VISIT (OUTPATIENT)
Dept: PODIATRY | Age: 70
End: 2020-01-06
Payer: MEDICARE

## 2020-01-06 VITALS
DIASTOLIC BLOOD PRESSURE: 82 MMHG | SYSTOLIC BLOOD PRESSURE: 143 MMHG | WEIGHT: 225 LBS | BODY MASS INDEX: 31.5 KG/M2 | TEMPERATURE: 97.9 F | HEIGHT: 71 IN

## 2020-01-06 PROCEDURE — 11721 DEBRIDE NAIL 6 OR MORE: CPT | Performed by: PODIATRIST

## 2020-01-06 ASSESSMENT — ENCOUNTER SYMPTOMS
EYES NEGATIVE: 1
RESPIRATORY NEGATIVE: 1

## 2020-01-06 NOTE — PROGRESS NOTES
questions/problems/concerns.    Other comorbidity noted and will be managed by PCP.  1/6/2020    Electronically signed by Diamond Mariee DPM on 1/6/2020 at 4:49 PM  1/6/2020

## 2020-01-30 ENCOUNTER — OFFICE VISIT (OUTPATIENT)
Dept: FAMILY MEDICINE CLINIC | Age: 70
End: 2020-01-30
Payer: MEDICARE

## 2020-01-30 VITALS
OXYGEN SATURATION: 96 % | SYSTOLIC BLOOD PRESSURE: 124 MMHG | DIASTOLIC BLOOD PRESSURE: 76 MMHG | TEMPERATURE: 97.2 F | WEIGHT: 229.6 LBS | HEART RATE: 76 BPM | BODY MASS INDEX: 32.14 KG/M2 | HEIGHT: 71 IN

## 2020-01-30 PROCEDURE — 99213 OFFICE O/P EST LOW 20 MIN: CPT | Performed by: FAMILY MEDICINE

## 2020-01-30 ASSESSMENT — ENCOUNTER SYMPTOMS
BACK PAIN: 0
SHORTNESS OF BREATH: 0
VOMITING: 0
COUGH: 0
CONSTIPATION: 0
DIARRHEA: 0
WHEEZING: 0
COLOR CHANGE: 0
EYE PAIN: 0
EYE DISCHARGE: 0
SORE THROAT: 0
SINUS PAIN: 0
NAUSEA: 0
TROUBLE SWALLOWING: 0
CHEST TIGHTNESS: 0
ALLERGIC/IMMUNOLOGIC NEGATIVE: 1
ABDOMINAL PAIN: 0

## 2020-01-30 NOTE — PROGRESS NOTES
(PROTONIX) 40 MG tablet, Take 1 tablet by mouth daily, Disp: 90 tablet, Rfl: 1    QUEtiapine (SEROQUEL) 25 MG tablet, Take 1 tablet by mouth daily as needed for Other (insomnia), Disp: 90 tablet, Rfl: 1    blood glucose test strips (ASCENSIA AUTODISC VI;ONE TOUCH ULTRA TEST VI) strip, 1 each by In Vitro route daily As needed. , Disp: 200 each, Rfl: 5    Dulaglutide (TRULICITY) 1.5 MP/7.5SZ SOPN, Inject 1.5 mg into the skin, Disp: , Rfl:     vitamin C (ASCORBIC ACID) 500 MG tablet, Take 500 mg by mouth daily, Disp: , Rfl:     ferrous sulfate 325 (65 Fe) MG tablet, Take 325 mg by mouth every other day, Disp: , Rfl:     Insulin Aspart (NOVOLOG SC), Inject 15 Units into the skin 3 times daily (before meals) , Disp: , Rfl:     loratadine (CLARITIN) 10 MG tablet, Take 10 mg by mouth, Disp: , Rfl:     LEVEMIR FLEXTOUCH 100 UNIT/ML injection pen, Inject 50 Units into the skin nightly , Disp: , Rfl:     aspirin 81 MG chewable tablet, Take 81 mg by mouth daily, Disp: , Rfl:     insulin glargine (LANTUS) 100 UNIT/ML injection vial, Inject 35 Units into the skin daily, Disp: , Rfl:   Allergies   Allergen Reactions    Cleocin [Clindamycin Phosphate]      c.diff    Cymbalta [Duloxetine Hcl]     Lactose Intolerance (Gi)        Past Medical History:   Diagnosis Date    CAD (coronary artery disease)     Diabetes mellitus ([facility] [address]     GERD (gastroesophageal reflux disease) 11/18/2016    Hyperlipidemia     Hypertension     Obesity      Past Surgical History:   Procedure Laterality Date    COLONOSCOPY      CORONARY ARTERY BYPASS GRAFT  11/23/2016    x4    ENDOSCOPY, COLON, DIAGNOSTIC      SKIN CANCER EXCISION  08/22/2017    SCC BERNAT- SCC 2018 LEFT EAR/NOSE/LEFT ARM     Family History   Problem Relation Age of Onset    Alzheimer's Disease Mother     Heart Disease Mother     Diabetes Mother     Heart Disease Father     Diabetes Paternal Grandmother      Social History     Socioeconomic History    Marital

## 2020-03-17 ENCOUNTER — OFFICE VISIT (OUTPATIENT)
Dept: FAMILY MEDICINE CLINIC | Age: 70
End: 2020-03-17
Payer: MEDICARE

## 2020-03-17 ENCOUNTER — HOSPITAL ENCOUNTER (OUTPATIENT)
Age: 70
Discharge: HOME | End: 2020-03-19
Payer: MEDICARE

## 2020-03-17 VITALS
SYSTOLIC BLOOD PRESSURE: 118 MMHG | HEIGHT: 71 IN | BODY MASS INDEX: 31.36 KG/M2 | WEIGHT: 224 LBS | DIASTOLIC BLOOD PRESSURE: 68 MMHG | HEART RATE: 78 BPM | OXYGEN SATURATION: 97 % | TEMPERATURE: 97.4 F

## 2020-03-17 LAB
ALBUMIN SERPL-MCNC: 4.4 G/DL (ref 3.5–5.2)
ALP BLD-CCNC: 97 U/L (ref 40–129)
ALT SERPL-CCNC: 38 U/L (ref 0–40)
ANION GAP SERPL CALCULATED.3IONS-SCNC: 19 MMOL/L (ref 7–16)
AST SERPL-CCNC: 26 U/L (ref 0–39)
BASOPHILS ABSOLUTE: 0.07 E9/L (ref 0–0.2)
BASOPHILS RELATIVE PERCENT: 1.1 % (ref 0–2)
BILIRUB SERPL-MCNC: 0.5 MG/DL (ref 0–1.2)
BUN BLDV-MCNC: 19 MG/DL (ref 8–23)
CALCIUM SERPL-MCNC: 9.5 MG/DL (ref 8.6–10.2)
CHLORIDE BLD-SCNC: 101 MMOL/L (ref 98–107)
CHOLESTEROL, TOTAL: 138 MG/DL (ref 0–199)
CO2: 21 MMOL/L (ref 22–29)
CREAT SERPL-MCNC: 1.6 MG/DL (ref 0.7–1.2)
CREATININE URINE: 208 MG/DL (ref 40–278)
EOSINOPHILS ABSOLUTE: 0.25 E9/L (ref 0.05–0.5)
EOSINOPHILS RELATIVE PERCENT: 3.8 % (ref 0–6)
FOLATE: 15.1 NG/ML (ref 4.8–24.2)
GFR AFRICAN AMERICAN: 52
GFR NON-AFRICAN AMERICAN: 43 ML/MIN/1.73
GLUCOSE BLD-MCNC: 342 MG/DL (ref 74–99)
HBA1C MFR BLD: 10.1 % (ref 4–5.6)
HCT VFR BLD CALC: 48.9 % (ref 37–54)
HDLC SERPL-MCNC: 34 MG/DL
HEMOGLOBIN: 16.1 G/DL (ref 12.5–16.5)
IMMATURE GRANULOCYTES #: 0.03 E9/L
IMMATURE GRANULOCYTES %: 0.5 % (ref 0–5)
LDL CHOLESTEROL CALCULATED: 65 MG/DL (ref 0–99)
LYMPHOCYTES ABSOLUTE: 1.51 E9/L (ref 1.5–4)
LYMPHOCYTES RELATIVE PERCENT: 22.7 % (ref 20–42)
MCH RBC QN AUTO: 30 PG (ref 26–35)
MCHC RBC AUTO-ENTMCNC: 32.9 % (ref 32–34.5)
MCV RBC AUTO: 91.1 FL (ref 80–99.9)
MICROALBUMIN UR-MCNC: 122.1 MG/L
MICROALBUMIN/CREAT UR-RTO: 58.7 (ref 0–30)
MONOCYTES ABSOLUTE: 0.48 E9/L (ref 0.1–0.95)
MONOCYTES RELATIVE PERCENT: 7.2 % (ref 2–12)
NEUTROPHILS ABSOLUTE: 4.3 E9/L (ref 1.8–7.3)
NEUTROPHILS RELATIVE PERCENT: 64.7 % (ref 43–80)
PDW BLD-RTO: 12.7 FL (ref 11.5–15)
PLATELET # BLD: 216 E9/L (ref 130–450)
PMV BLD AUTO: 11.7 FL (ref 7–12)
POTASSIUM SERPL-SCNC: 4.2 MMOL/L (ref 3.5–5)
RBC # BLD: 5.37 E12/L (ref 3.8–5.8)
SODIUM BLD-SCNC: 141 MMOL/L (ref 132–146)
T4 FREE: 1.11 NG/DL (ref 0.93–1.7)
TOTAL CK: 146 U/L (ref 20–200)
TOTAL PROTEIN: 8 G/DL (ref 6.4–8.3)
TRIGL SERPL-MCNC: 195 MG/DL (ref 0–149)
TSH SERPL DL<=0.05 MIU/L-ACNC: 2.71 UIU/ML (ref 0.27–4.2)
URIC ACID, SERUM: 4.1 MG/DL (ref 3.4–7)
VITAMIN B-12: 399 PG/ML (ref 211–946)
VITAMIN D 25-HYDROXY: 34 NG/ML (ref 30–100)
VLDLC SERPL CALC-MCNC: 39 MG/DL
WBC # BLD: 6.6 E9/L (ref 4.5–11.5)

## 2020-03-17 PROCEDURE — 84443 ASSAY THYROID STIM HORMONE: CPT

## 2020-03-17 PROCEDURE — 80061 LIPID PANEL: CPT

## 2020-03-17 PROCEDURE — 82570 ASSAY OF URINE CREATININE: CPT

## 2020-03-17 PROCEDURE — 84439 ASSAY OF FREE THYROXINE: CPT

## 2020-03-17 PROCEDURE — 85025 COMPLETE CBC W/AUTO DIFF WBC: CPT

## 2020-03-17 PROCEDURE — 82044 UR ALBUMIN SEMIQUANTITATIVE: CPT

## 2020-03-17 PROCEDURE — 83036 HEMOGLOBIN GLYCOSYLATED A1C: CPT

## 2020-03-17 PROCEDURE — 84550 ASSAY OF BLOOD/URIC ACID: CPT

## 2020-03-17 PROCEDURE — 82550 ASSAY OF CK (CPK): CPT

## 2020-03-17 PROCEDURE — 80053 COMPREHEN METABOLIC PANEL: CPT

## 2020-03-17 PROCEDURE — 36415 COLL VENOUS BLD VENIPUNCTURE: CPT

## 2020-03-17 PROCEDURE — 99214 OFFICE O/P EST MOD 30 MIN: CPT | Performed by: FAMILY MEDICINE

## 2020-03-17 PROCEDURE — 82306 VITAMIN D 25 HYDROXY: CPT

## 2020-03-17 PROCEDURE — 82607 VITAMIN B-12: CPT

## 2020-03-17 PROCEDURE — 82746 ASSAY OF FOLIC ACID SERUM: CPT

## 2020-03-17 RX ORDER — PREGABALIN 75 MG/1
75 CAPSULE ORAL 2 TIMES DAILY
Qty: 180 CAPSULE | Refills: 0 | Status: SHIPPED
Start: 2020-03-17 | End: 2020-08-05 | Stop reason: SDUPTHER

## 2020-03-17 ASSESSMENT — PATIENT HEALTH QUESTIONNAIRE - PHQ9
SUM OF ALL RESPONSES TO PHQ9 QUESTIONS 1 & 2: 2
SUM OF ALL RESPONSES TO PHQ QUESTIONS 1-9: 2
2. FEELING DOWN, DEPRESSED OR HOPELESS: 1
SUM OF ALL RESPONSES TO PHQ QUESTIONS 1-9: 2
1. LITTLE INTEREST OR PLEASURE IN DOING THINGS: 1

## 2020-03-17 ASSESSMENT — ENCOUNTER SYMPTOMS
CHEST TIGHTNESS: 0
SHORTNESS OF BREATH: 0
WHEEZING: 0
DIARRHEA: 0
ALLERGIC/IMMUNOLOGIC NEGATIVE: 1
TROUBLE SWALLOWING: 0
BACK PAIN: 0
CONSTIPATION: 0
ABDOMINAL PAIN: 0
VOMITING: 0
COLOR CHANGE: 0
EYE DISCHARGE: 0
SORE THROAT: 0
SINUS PAIN: 0
COUGH: 0
EYE PAIN: 0
NAUSEA: 0

## 2020-03-17 NOTE — PROGRESS NOTES
3/17/20  Zack Alfaro : 1950 Sex: male  Age: 65 y.o. Chief Complaint   Patient presents with    Hypertension    Hyperlipidemia       HPI:  65 y.o. male here for PE of chronic medical problems, med recheck/refill, Oarrs review, fbw. BSS high. Review of Systems   Constitutional: Negative for chills, diaphoresis and fever. HENT: Negative for congestion, ear pain, sinus pain, sneezing, sore throat, tinnitus and trouble swallowing. Eyes: Negative for pain, discharge and visual disturbance. Respiratory: Negative for cough, chest tightness, shortness of breath and wheezing. Cardiovascular: Negative for chest pain and palpitations. Gastrointestinal: Negative for abdominal pain, constipation, diarrhea, nausea and vomiting. Endocrine: Negative for polydipsia and polyuria. Genitourinary: Negative for difficulty urinating, flank pain and frequency. Musculoskeletal: Negative for arthralgias, back pain, joint swelling and myalgias. Skin: Negative for color change and rash. Allergic/Immunologic: Negative. Neurological: Positive for numbness. Negative for dizziness, tremors, seizures, syncope and light-headedness. Hematological: Negative for adenopathy. Psychiatric/Behavioral: Negative for behavioral problems and hallucinations. The patient is not nervous/anxious. Current Outpatient Medications:     pregabalin (LYRICA) 75 MG capsule, Take 1 capsule by mouth 2 times daily for 90 days. , Disp: 180 capsule, Rfl: 0    amLODIPine (NORVASC) 5 MG tablet, Take 1 tablet by mouth daily, Disp: 90 tablet, Rfl: 1    atorvastatin (LIPITOR) 40 MG tablet, Take 1 tablet by mouth daily (Patient taking differently: Take 40 mg by mouth nightly ), Disp: 90 tablet, Rfl: 1    Insulin Pen Needle (B-D UF III MINI PEN NEEDLES) 31G X 5 MM MISC, Use one needle as needed for insulin administration, Disp: 300 each, Rfl: 1    metoprolol succinate (TOPROL XL) 25 MG extended release tablet, Take 1 tablet by Pupils are equal, round, and reactive to light. Neck:      Musculoskeletal: Normal range of motion and neck supple. Vascular: No carotid bruit. Cardiovascular:      Rate and Rhythm: Normal rate and regular rhythm. Heart sounds: Normal heart sounds. No murmur. Pulmonary:      Effort: Pulmonary effort is normal.      Breath sounds: Normal breath sounds. Abdominal:      General: Bowel sounds are normal.      Palpations: Abdomen is soft. Musculoskeletal: Normal range of motion. Skin:     General: Skin is warm and dry. Neurological:      Mental Status: He is alert and oriented to person, place, and time. Psychiatric:         Mood and Affect: Mood normal.         POCT Orders   No Active POCT       Assessment and Plan:  Augusto Esposito was seen today for hypertension and hyperlipidemia. Diagnoses and all orders for this visit:    Essential hypertension  -     CBC Auto Differential; Future  -     Comprehensive Metabolic Panel; Future  -     Uric Acid; Future    Type 2 diabetes mellitus with diabetic polyneuropathy, with long-term current use of insulin (HCC)  -     pregabalin (LYRICA) 75 MG capsule; Take 1 capsule by mouth 2 times daily for 90 days.  -     Hemoglobin A1C; Future  -     Microalbumin / Creatinine Urine Ratio; Future    Hyperlipidemia, unspecified hyperlipidemia type  -     CK; Future  -     Lipid Panel; Future    Hypothyroidism, unspecified type  -     T4, Free; Future  -     TSH without Reflex; Future    Vitamin D deficiency  -     Vitamin D 25 Hydroxy; Future    Vitamin B12 deficiency  -     Vitamin B12 & Folate; Future      Reviewed pmhx, meds, diet, exercise, Oarrs. Rx written. Recheck post labs. Return in about 3 months (around 6/17/2020).      Seen By:  Aiden Amezquita DO

## 2020-03-18 RX ORDER — INSULIN DETEMIR 100 [IU]/ML
50 INJECTION, SOLUTION SUBCUTANEOUS NIGHTLY
Qty: 5 PEN | Refills: 0 | Status: SHIPPED
Start: 2020-03-18 | End: 2020-08-05 | Stop reason: SDUPTHER

## 2020-03-18 NOTE — TELEPHONE ENCOUNTER
Pharmacy calling in. Last night patient came to the pharmacy stating he was completely out of his Levemir. Pharmacy gave him a box to get him through until he was able to contact our office. Patient stated he would contact our office today. They received a rejection notice from our office on the Levemir today. They are wondering if they can get refill for one box to cover the box they gave patient?

## 2020-04-01 RX ORDER — PANTOPRAZOLE SODIUM 40 MG/1
40 TABLET, DELAYED RELEASE ORAL DAILY
Qty: 90 TABLET | Refills: 1 | Status: SHIPPED
Start: 2020-04-01 | End: 2020-08-05 | Stop reason: SDUPTHER

## 2020-04-28 ENCOUNTER — PROCEDURE VISIT (OUTPATIENT)
Dept: PODIATRY | Age: 70
End: 2020-04-28
Payer: MEDICARE

## 2020-04-28 VITALS
WEIGHT: 225 LBS | TEMPERATURE: 97.5 F | HEIGHT: 71 IN | SYSTOLIC BLOOD PRESSURE: 138 MMHG | DIASTOLIC BLOOD PRESSURE: 78 MMHG | BODY MASS INDEX: 31.5 KG/M2

## 2020-04-28 PROCEDURE — 11721 DEBRIDE NAIL 6 OR MORE: CPT | Performed by: PODIATRIST

## 2020-04-28 ASSESSMENT — ENCOUNTER SYMPTOMS
RESPIRATORY NEGATIVE: 1
EYES NEGATIVE: 1

## 2020-04-28 NOTE — PROGRESS NOTES
[address] [place] [address]  Dept: [phone]  Dept Fax: [phone]    DIABETIC NAIL PROGRESS NOTE  Date of patient's visit: 4/28/2020  Patient's Name:  Nikita Khoury YOB: 1950            Patient Care Team:  Natalia Baugh DO as PCP - General (Family Medicine)  Natalia Baugh DO as PCP - [facility] Provider  Ruben Waller DPM as Consulting Physician (Podiatry)          Chief Complaint   Patient presents with    Toe Pain     saw PCP Dr. Bob Vail on 3/17/2020    Diabetes       Subjective: Nikita Khoury comes to clinic for Toe Pain (saw PCP Dr. Bob Vail on 3/17/2020) and Diabetes    he is a diabetic and states that diabetic foot exam .  Pt currently has complaint of thickened, elongated nails that they cannot manage by themselves. Pt's primary care physician is Natalia Baugh DO last seen   . 3-     Lab Results   Component Value Date    LABA1C 10.1 (H) 03/17/2020      Complains of numbness in the feet bilat. Past Medical History:   Diagnosis Date    CAD (coronary artery disease)     Diabetes mellitus ([address]     GERD (gastroesophageal reflux disease) 11/18/2016    Hyperlipidemia     Hypertension     Obesity        Allergies   Allergen Reactions    Cleocin [Clindamycin Phosphate]      c.diff    Clindamycin Hcl      C. Diff.  Cymbalta [Duloxetine Hcl]     Lactose Intolerance (Gi)      Current Outpatient Medications on File Prior to Visit   Medication Sig Dispense Refill    pantoprazole (PROTONIX) 40 MG tablet Take 1 tablet by mouth daily 90 tablet 1    LEVEMIR FLEXTOUCH 100 UNIT/ML injection pen Inject 50 Units into the skin nightly 5 pen 0    pregabalin (LYRICA) 75 MG capsule Take 1 capsule by mouth 2 times daily for 90 days.  180 capsule 0    amLODIPine (NORVASC) 5 MG tablet Take 1 tablet by mouth daily 90 tablet 1    atorvastatin (LIPITOR) 40 MG tablet Take 1 tablet by

## 2020-06-05 NOTE — TELEPHONE ENCOUNTER
CLINICAL PHARMACY: ADHERENCE REVIEW  Identified care gap per [employer]; fills at [facility]: Statin adherence    Last Office Visit: 3/17/20    Patient also appears to be taking: DM regimen     ASSESSMENT  DIABETES ADHERENCE  PDC: not reported by [employer] (insulin regimen + Trulicity)  Lab Results   Component Value Date    LABA1C 10.1 (H) 03/17/2020    LABA1C 9.6 (H) 12/23/2019    LABA1C 9.2 (H) 09/30/2019   - appears f/b [facility] endocrinology; per 5/29/20 telephone encounter \"His A1C is 8.5. Down from 9.7 so it is improving. If he gets his BG readings to us we can adjust his insulin to try and bring it down further. \"    [place]  PDC: 1st fill YTD    Per [address]   Atorvastatin 40mg daily last filled on 5/28/20 for a 90 day supply and has been picked up. 0 refills remaining. (prev fill: #90 on 1/8/20)    Lab Results   Component Value Date    CHOL 138 03/17/2020    TRIG 195 (H) 03/17/2020    HDL 34 03/17/2020    LDLCALC 65 03/17/2020     ALT   Date Value Ref Range Status   03/17/2020 38 0 - 40 U/L Final     AST   Date Value Ref Range Status   03/17/2020 26 0 - 39 U/L Final     The 10-year ASCVD risk score (Waqar Baez., et al., 2013) is: 44.8%    Values used to calculate the score:      Age: 65 years      Sex: Male      Is Non- : No      Diabetic: Yes      Tobacco smoker: No      Systolic Blood Pressure: 117 mmHg      Is BP treated: Yes      HDL Cholesterol: 34 mg/dL      Total Cholesterol: 138 mg/dL       PLAN  Attempting to reach patient to review.  Left message asking for return call.   · Atorvastatin adherence? (has current supply, but was >1.5 mos past due for refill)  · F/u with endocrinology (if hasn't already) with BG log    Chandra Ramirez, PharmD, [address]  Direct: [phone]  Department, toll free: [phone], option 7

## 2020-06-09 RX ORDER — ATORVASTATIN CALCIUM 40 MG/1
40 TABLET, FILM COATED ORAL DAILY
Qty: 90 TABLET | Refills: 1 | Status: SHIPPED
Start: 2020-06-09 | End: 2020-08-05 | Stop reason: SDUPTHER

## 2020-06-09 NOTE — TELEPHONE ENCOUNTER
Taty Kimbrough DO (covering), patient will need rx for refill due in Aug.    LOV: 3/17/20  Next: 7/10/20 - with Taty Kimbrough MD    Thank you,  Dmitry Hardy, PharmD, [facility]  Direct: [phone]  Department, toll free: [phone], option 7    =======================================================   Patient returning call. Confirms he has and is taking atorvastatin 40mg daily. Admits to missing doses; states he takes in the evening because he thought he had to. Discussed could take at any time of day, taking every day is the most important part - he thinks changing to AM will help. Will need rx for next refill due in Aug.    States he just spoke to [facility] endocrinology yesterday with his BG readings (now noted per update of CareEverywhere) - adjustments to insulin made. Congratulated patient on improvement in A1c.

## 2020-06-10 RX ORDER — FLASH GLUCOSE SCANNING READER
EACH MISCELLANEOUS
Qty: 1 DEVICE | Refills: 1 | Status: SHIPPED | OUTPATIENT
Start: 2020-06-10

## 2020-06-10 RX ORDER — FLASH GLUCOSE SENSOR
KIT MISCELLANEOUS
Qty: 6 EACH | Refills: 1 | Status: SHIPPED | OUTPATIENT
Start: 2020-06-10 | Stop reason: SDUPTHER

## 2020-06-10 NOTE — TELEPHONE ENCOUNTER
Noted atorvastatin reordered, thank you! Also as per other refill encounter today, patient had called in asking for rx for [facility] - noted that rx already approved also, thank you!    =======================================================   For Pharmacy Admin Tracking Only    PHSO: Yes  Total # of Interventions Recommended: 2  - New Order #: 0 New Medication Order Reason(s):  Adherence, Patient Preference  - Refills Provided #: 2  - Maintenance Safety Lab Monitoring #: 1  - New Therapy Lab Monitoring #: 1  Recommended intervention potential cost savings: 1  Total Interventions Accepted: 2  Time Spent (min): 20

## 2020-07-08 RX ORDER — PEN NEEDLE, DIABETIC 31 GX5/16"
NEEDLE, DISPOSABLE MISCELLANEOUS
Qty: 300 EACH | Refills: 0 | Status: SHIPPED
Start: 2020-07-08 | End: 2020-08-05 | Stop reason: SDUPTHER

## 2020-07-10 PROBLEM — A49.8 CLOSTRIDIUM DIFFICILE INFECTION: Status: ACTIVE | Noted: 2017-09-24

## 2020-07-10 PROBLEM — M79.675 PAIN IN LEFT TOE(S): Status: RESOLVED | Noted: 2019-06-24 | Resolved: 2020-07-10

## 2020-07-10 PROBLEM — N17.9 ACUTE KIDNEY INJURY SUPERIMPOSED ON CHRONIC KIDNEY DISEASE (HCC): Status: ACTIVE | Noted: 2017-09-24

## 2020-07-10 PROBLEM — N18.9 ACUTE KIDNEY INJURY SUPERIMPOSED ON CHRONIC KIDNEY DISEASE (HCC): Status: ACTIVE | Noted: 2017-09-24

## 2020-07-10 PROBLEM — D63.1 ANEMIA OF RENAL DISEASE: Status: ACTIVE | Noted: 2017-09-24

## 2020-07-10 PROBLEM — E55.9 VITAMIN D DEFICIENCY: Status: ACTIVE | Noted: 2020-07-10

## 2020-07-10 PROBLEM — I25.810 CAD (CORONARY ARTERY DISEASE) OF ARTERY BYPASS GRAFT: Status: ACTIVE | Noted: 2020-07-10

## 2020-07-10 PROBLEM — M79.674 PAIN IN TOE OF RIGHT FOOT: Status: RESOLVED | Noted: 2019-06-24 | Resolved: 2020-07-10

## 2020-07-10 PROBLEM — B35.1 TINEA UNGUIUM: Status: RESOLVED | Noted: 2019-06-24 | Resolved: 2020-07-10

## 2020-07-10 PROBLEM — N18.9 ANEMIA OF RENAL DISEASE: Status: ACTIVE | Noted: 2017-09-24

## 2020-07-27 ENCOUNTER — PROCEDURE VISIT (OUTPATIENT)
Dept: PODIATRY | Age: 70
End: 2020-07-27
Payer: MEDICARE

## 2020-07-27 VITALS
HEIGHT: 71 IN | WEIGHT: 235 LBS | BODY MASS INDEX: 32.9 KG/M2 | SYSTOLIC BLOOD PRESSURE: 123 MMHG | TEMPERATURE: 97.8 F | DIASTOLIC BLOOD PRESSURE: 78 MMHG

## 2020-07-27 PROCEDURE — 99999 PR OFFICE/OUTPT VISIT,PROCEDURE ONLY: CPT | Performed by: PODIATRIST

## 2020-07-27 PROCEDURE — 11721 DEBRIDE NAIL 6 OR MORE: CPT | Performed by: PODIATRIST

## 2020-07-27 ASSESSMENT — ENCOUNTER SYMPTOMS
RESPIRATORY NEGATIVE: 1
EYES NEGATIVE: 1

## 2020-07-27 NOTE — PROGRESS NOTES
pantoprazole (PROTONIX) 40 MG tablet Take 1 tablet by mouth daily 90 tablet 1    LEVEMIR FLEXTOUCH 100 UNIT/ML injection pen Inject 50 Units into the skin nightly 5 pen 0    amLODIPine (NORVASC) 5 MG tablet Take 1 tablet by mouth daily 90 tablet 1    metoprolol succinate (TOPROL XL) 25 MG extended release tablet Take 1 tablet by mouth daily 90 tablet 1    QUEtiapine (SEROQUEL) 25 MG tablet Take 1 tablet by mouth daily as needed for Other (insomnia) 90 tablet 1    blood glucose test strips (ASCENSIA AUTODISC VI;ONE TOUCH ULTRA TEST VI) strip 1 each by In Vitro route daily As needed. 200 each 5    Dulaglutide (TRULICITY) 1.5 GJ/4.2AM SOPN Inject 1.5 mg into the skin      vitamin C (ASCORBIC ACID) 500 MG tablet Take 500 mg by mouth daily      ferrous sulfate 325 (65 Fe) MG tablet Take 325 mg by mouth every other day      Insulin Aspart (NOVOLOG SC) Inject 15 Units into the skin 3 times daily (before meals)       loratadine (CLARITIN) 10 MG tablet Take 10 mg by mouth      aspirin 81 MG chewable tablet Take 81 mg by mouth daily      pregabalin (LYRICA) 75 MG capsule Take 1 capsule by mouth 2 times daily for 90 days. 180 capsule 0     No current facility-administered medications on file prior to visit. Review of Systems   Constitutional: Negative. HENT: Negative. Eyes: Negative. Respiratory: Negative. Cardiovascular: Negative. Endocrine: Negative. Genitourinary: Negative. Review of Systems:   History obtained from chart review and the patient  General ROS: negative for - chills, fatigue, fever, night sweats or weight gain  Constitutional: Negative for chills, diaphoresis, fatigue, fever and unexpected weight change. Musculoskeletal: Positive for arthralgias, gait problem and joint swelling. Neurological ROS: negative for - behavioral changes, confusion, headaches or seizures. Negative for weakness and numbness.    Dermatological ROS: negative for - mole changes, pt. Informed patient on proper diabetic foot care and importance of tight glycemic control. Patient to check feet daily and contact the office with any questions/problems/concerns. Other comorbidity noted and will be managed by PCP.  7/27/2020    Electronically signed by Vince Torres DPM on 7/27/2020 at 1:25 PM  7/27/2020     It was discussed in detail with the patient proper hygiene for the diabetic foot. They are to get into a habit of looking at their feet or have someone look at them. If they are unable to daily, they are to look for any signs of redness, blistering, cracking, swelling, drainage, open lesions, etc.  They are to dry in-between the toes after each bath or shower gently. The water should be tested with their elbow to prevent burns. The patient is to refrain from soaking their feet unless instructed by myself to do otherwise. They are to refrain from going barefoot. Shoe gear should be inspected for any foreign objects. Shoes should have a deep, wide toe box area. With any type of shoe, the feet should be inspected for any signs of pressure, i.e., redness, blistering, or open lesions. The patient was instructed to contact myself or other health care provider with any questions.

## 2020-08-04 RX ORDER — AMLODIPINE BESYLATE 10 MG/1
TABLET ORAL
COMMUNITY
Start: 2020-07-16 | End: 2021-06-03 | Stop reason: SDUPTHER

## 2020-08-04 RX ORDER — PROMETHAZINE HYDROCHLORIDE 25 MG/1
TABLET ORAL
COMMUNITY
Start: 2020-07-19 | End: 2020-11-09

## 2020-08-05 ENCOUNTER — HOSPITAL ENCOUNTER (OUTPATIENT)
Age: 70
Discharge: HOME | End: 2020-08-07
Payer: MEDICARE

## 2020-08-05 ENCOUNTER — OFFICE VISIT (OUTPATIENT)
Dept: PRIMARY CARE CLINIC | Age: 70
End: 2020-08-05
Payer: MEDICARE

## 2020-08-05 VITALS
WEIGHT: 224 LBS | DIASTOLIC BLOOD PRESSURE: 76 MMHG | HEART RATE: 70 BPM | SYSTOLIC BLOOD PRESSURE: 138 MMHG | OXYGEN SATURATION: 98 % | TEMPERATURE: 97.4 F | BODY MASS INDEX: 31.24 KG/M2

## 2020-08-05 PROBLEM — N18.9 ANEMIA OF RENAL DISEASE: Status: RESOLVED | Noted: 2017-09-24 | Resolved: 2020-08-05

## 2020-08-05 PROBLEM — A49.8 CLOSTRIDIUM DIFFICILE INFECTION: Status: RESOLVED | Noted: 2017-09-24 | Resolved: 2020-08-05

## 2020-08-05 PROBLEM — N18.9 ACUTE KIDNEY INJURY SUPERIMPOSED ON CHRONIC KIDNEY DISEASE (HCC): Status: RESOLVED | Noted: 2017-09-24 | Resolved: 2020-08-05

## 2020-08-05 PROBLEM — D63.1 ANEMIA OF RENAL DISEASE: Status: RESOLVED | Noted: 2017-09-24 | Resolved: 2020-08-05

## 2020-08-05 PROBLEM — N17.9 ACUTE KIDNEY INJURY SUPERIMPOSED ON CHRONIC KIDNEY DISEASE (HCC): Status: RESOLVED | Noted: 2017-09-24 | Resolved: 2020-08-05

## 2020-08-05 PROBLEM — I50.33 ACUTE ON CHRONIC DIASTOLIC HEART FAILURE (HCC): Status: RESOLVED | Noted: 2017-10-11 | Resolved: 2020-08-05

## 2020-08-05 LAB
ALBUMIN SERPL-MCNC: 4.4 G/DL (ref 3.5–5.2)
ALP BLD-CCNC: 107 U/L (ref 40–129)
ALT SERPL-CCNC: 24 U/L (ref 0–40)
ANION GAP SERPL CALCULATED.3IONS-SCNC: 18 MMOL/L (ref 7–16)
AST SERPL-CCNC: 17 U/L (ref 0–39)
BASOPHILS ABSOLUTE: 0.08 E9/L (ref 0–0.2)
BASOPHILS RELATIVE PERCENT: 1 % (ref 0–2)
BILIRUB SERPL-MCNC: 0.5 MG/DL (ref 0–1.2)
BUN BLDV-MCNC: 18 MG/DL (ref 8–23)
CALCIUM SERPL-MCNC: 9.7 MG/DL (ref 8.6–10.2)
CHLORIDE BLD-SCNC: 100 MMOL/L (ref 98–107)
CO2: 20 MMOL/L (ref 22–29)
CREAT SERPL-MCNC: 1.5 MG/DL (ref 0.7–1.2)
EOSINOPHILS ABSOLUTE: 0.42 E9/L (ref 0.05–0.5)
EOSINOPHILS RELATIVE PERCENT: 5.2 % (ref 0–6)
GFR AFRICAN AMERICAN: 56
GFR NON-AFRICAN AMERICAN: 46 ML/MIN/1.73
GLUCOSE BLD-MCNC: 319 MG/DL (ref 74–99)
HBA1C MFR BLD: 9.7 % (ref 4–5.6)
HCT VFR BLD CALC: 48.2 % (ref 37–54)
HEMOGLOBIN: 15.8 G/DL (ref 12.5–16.5)
IMMATURE GRANULOCYTES #: 0.04 E9/L
IMMATURE GRANULOCYTES %: 0.5 % (ref 0–5)
LYMPHOCYTES ABSOLUTE: 1.54 E9/L (ref 1.5–4)
LYMPHOCYTES RELATIVE PERCENT: 19.1 % (ref 20–42)
MCH RBC QN AUTO: 30.7 PG (ref 26–35)
MCHC RBC AUTO-ENTMCNC: 32.8 % (ref 32–34.5)
MCV RBC AUTO: 93.8 FL (ref 80–99.9)
MONOCYTES ABSOLUTE: 0.64 E9/L (ref 0.1–0.95)
MONOCYTES RELATIVE PERCENT: 7.9 % (ref 2–12)
NEUTROPHILS ABSOLUTE: 5.35 E9/L (ref 1.8–7.3)
NEUTROPHILS RELATIVE PERCENT: 66.3 % (ref 43–80)
PDW BLD-RTO: 13 FL (ref 11.5–15)
PLATELET # BLD: 230 E9/L (ref 130–450)
PMV BLD AUTO: 11.3 FL (ref 7–12)
POTASSIUM SERPL-SCNC: 4.4 MMOL/L (ref 3.5–5)
RBC # BLD: 5.14 E12/L (ref 3.8–5.8)
SODIUM BLD-SCNC: 138 MMOL/L (ref 132–146)
TOTAL PROTEIN: 8.2 G/DL (ref 6.4–8.3)
WBC # BLD: 8.1 E9/L (ref 4.5–11.5)

## 2020-08-05 PROCEDURE — 83036 HEMOGLOBIN GLYCOSYLATED A1C: CPT

## 2020-08-05 PROCEDURE — 85025 COMPLETE CBC W/AUTO DIFF WBC: CPT

## 2020-08-05 PROCEDURE — 80053 COMPREHEN METABOLIC PANEL: CPT

## 2020-08-05 PROCEDURE — 99214 OFFICE O/P EST MOD 30 MIN: CPT | Performed by: FAMILY MEDICINE

## 2020-08-05 PROCEDURE — 36415 COLL VENOUS BLD VENIPUNCTURE: CPT

## 2020-08-05 RX ORDER — PEN NEEDLE, DIABETIC 31 GX5/16"
NEEDLE, DISPOSABLE MISCELLANEOUS
Qty: 300 EACH | Refills: 0 | Status: SHIPPED
Start: 2020-08-05 | End: 2021-03-07

## 2020-08-05 RX ORDER — PREGABALIN 75 MG/1
75 CAPSULE ORAL 2 TIMES DAILY
Qty: 180 CAPSULE | Refills: 1 | Status: SHIPPED
Start: 2020-08-05 | End: 2020-11-09

## 2020-08-05 RX ORDER — ATORVASTATIN CALCIUM 40 MG/1
40 TABLET, FILM COATED ORAL DAILY
Qty: 90 TABLET | Refills: 1 | Status: SHIPPED
Start: 2020-08-05 | End: 2021-03-02 | Stop reason: SDUPTHER

## 2020-08-05 RX ORDER — INSULIN DETEMIR 100 [IU]/ML
INJECTION, SOLUTION SUBCUTANEOUS
Qty: 54 ML | Refills: 1 | Status: SHIPPED
Start: 2020-08-05 | End: 2020-11-09

## 2020-08-05 RX ORDER — PANTOPRAZOLE SODIUM 40 MG/1
40 TABLET, DELAYED RELEASE ORAL DAILY
Qty: 90 TABLET | Refills: 1 | Status: SHIPPED
Start: 2020-08-05 | End: 2021-03-29

## 2020-08-05 RX ORDER — QUETIAPINE FUMARATE 25 MG/1
25 TABLET, FILM COATED ORAL DAILY PRN
Qty: 90 TABLET | Refills: 1 | Status: SHIPPED
Start: 2020-08-05 | End: 2021-03-29

## 2020-08-05 RX ORDER — METOPROLOL SUCCINATE 25 MG/1
25 TABLET, EXTENDED RELEASE ORAL DAILY
Qty: 90 TABLET | Refills: 1 | Status: SHIPPED
Start: 2020-08-05 | End: 2021-03-25

## 2020-08-05 ASSESSMENT — ENCOUNTER SYMPTOMS
VOMITING: 0
CONSTIPATION: 0
WHEEZING: 0
ABDOMINAL PAIN: 0
NAUSEA: 0
COUGH: 0
SHORTNESS OF BREATH: 0
BACK PAIN: 0
DIARRHEA: 0

## 2020-08-05 NOTE — PROGRESS NOTES
20  Woodrow Hernandez Hum : 1950 Sex: male  Age: 65 y.o. Chief Complaint   Patient presents with   Mkio Zhao Doctor     HPI:  65 y.o. male presents today for {NUMBER:54416} {TIME FRAME:} follow up of ***. Patient's chart, medical, surgical and medication history all reviewed. ROS:  Review of Systems   Current Outpatient Medications on File Prior to Visit   Medication Sig Dispense Refill    amLODIPine (NORVASC) 10 MG tablet TAKE 1 TABLET BY MOUTH ONCE DAILY      promethazine (PHENERGAN) 25 MG tablet TAKE 1 TABLET BY MOUTH EVERY 6 HOURS      Insulin Pen Needle (B-D UF III MINI PEN NEEDLES) 31G X 5 MM MISC USE AS DIRECTED AS NEEDED FOR  INSULIN  ADMINISTRATION 300 each 0    Continuous Blood Gluc  (FREESTYLE [place] 14 DAY READER) JOSSE Use as directed to monitor blood glucose 1 Device 1    Continuous Blood Gluc Sensor (FREESTYLE [place] 14 DAY SENSOR) MISC Use as directed to monitor blood glucose 6 each 1    atorvastatin (LIPITOR) 40 MG tablet Take 1 tablet by mouth daily 90 tablet 1    pantoprazole (PROTONIX) 40 MG tablet Take 1 tablet by mouth daily 90 tablet 1    LEVEMIR FLEXTOUCH 100 UNIT/ML injection pen Inject 50 Units into the skin nightly 5 pen 0    pregabalin (LYRICA) 75 MG capsule Take 1 capsule by mouth 2 times daily for 90 days. 180 capsule 0    metoprolol succinate (TOPROL XL) 25 MG extended release tablet Take 1 tablet by mouth daily 90 tablet 1    QUEtiapine (SEROQUEL) 25 MG tablet Take 1 tablet by mouth daily as needed for Other (insomnia) 90 tablet 1    blood glucose test strips (ASCENSIA AUTODISC VI;ONE TOUCH ULTRA TEST VI) strip 1 each by In Vitro route daily As needed.  200 each 5    Dulaglutide (TRULICITY) 1.5 XF/4.6UO SOPN Inject 1.5 mg into the skin      vitamin C (ASCORBIC ACID) 500 MG tablet Take 500 mg by mouth daily      ferrous sulfate 325 (65 Fe) MG tablet Take 325 mg by mouth every other day      Insulin Aspart (NOVOLOG SC) Inject 15 Units into the skin 3 times daily (before meals)       loratadine (CLARITIN) 10 MG tablet Take 10 mg by mouth      aspirin 81 MG chewable tablet Take 81 mg by mouth daily       No current facility-administered medications on file prior to visit. Allergies   Allergen Reactions    Clindamycin Hcl      C. Diff.       Cymbalta [Duloxetine Hcl]     Lactose Intolerance (Gi)     Metformin        Past Medical History:   Diagnosis Date    CAD (coronary artery disease)     Diabetes mellitus ([facility] [address]     GERD (gastroesophageal reflux disease) 2016    Hyperlipidemia     Hypertension     Obesity      Past Surgical History:   Procedure Laterality Date    COLONOSCOPY      CORONARY ARTERY BYPASS GRAFT  11/23/2016    x4    ENDOSCOPY, COLON, DIAGNOSTIC      SKIN CANCER EXCISION  2017    SCC BERNAT- SCC 2018 LEFT EAR/NOSE/LEFT ARM     Family History   Problem Relation Age of Onset    Alzheimer's Disease Mother     Heart Disease Mother     Diabetes Mother     Heart Disease Father     Diabetes Paternal Grandmother      Social History     Socioeconomic History    Marital status:      Spouse name: Not on file    Number of children: Not on file    Years of education: Not on file    Highest education level: Not on file   Occupational History    Not on file   Social Needs    Financial resource strain: Not on file    Food insecurity     Worry: Not on file     Inability: Not on file    Transportation needs     Medical: Not on file     Non-medical: Not on file   Tobacco Use    Smoking status: Former Smoker     Types: Cigarettes     Last attempt to quit:      Years since quittin.6    Smokeless tobacco: Former User     Quit date: 1999   Substance and Sexual Activity    Alcohol use: No    Drug use: No    Sexual activity: Not on file   Lifestyle    Physical activity     Days per week: Not on file     Minutes per session: Not on file    Stress: Not on file   Relationships    Social connections     Talks on phone: Not on file     Gets together: Not on file     Attends Episcopal service: Not on file     Active member of club or organization: Not on file     Attends meetings of clubs or organizations: Not on file     Relationship status: Not on file    Intimate partner violence     Fear of current or ex partner: Not on file     Emotionally abused: Not on file     Physically abused: Not on file     Forced sexual activity: Not on file   Other Topics Concern    Not on file   Social History Narrative    Not on file       Vitals:    08/05/20 1001   BP: 138/76   Pulse: 70   Temp: 97.4 °F (36.3 °C)   SpO2: 98%   Weight: 224 lb (101.6 kg)       Physical Exam:  Physical Exam    Labs:  {DZFD:[id number]}     Assessment and Plan:  Mendel Miss was seen today for established new doctor. Diagnoses and all orders for this visit:    Benign essential hypertension    Type 2 diabetes mellitus with diabetic polyneuropathy, with long-term current use of insulin (HCC)    Mixed hyperlipidemia    Vitamin D deficiency    Diabetic polyneuropathy associated with type 2 diabetes mellitus (HCC)    Gastroesophageal reflux disease without esophagitis    Peripheral vascular disease (HCC)        No follow-ups on file.       Seen By:  Marifer Solares DO

## 2020-08-05 NOTE — PROGRESS NOTES
20  Luis Alfaro : 1950 Sex: male  Age: 65 y.o. Chief Complaint   Patient presents with   Jacquelnie Patel     HPI:  65 y.o. male patient of Dr. Kelsy Chavez  presents today for 3 month(s) follow up of chronic medical conditions, medication refills and FBW. Patient's chart, medical, surgical and medication history all reviewed. Diabetes Mellitus  65 y.o. male presents for follow up of type 2 diabetes. Current diabetic medications include: insulin injections: Levemir 99/98I and Trulicity. Previous medications tried include: oral agents (dual therapy): glipizide (Glucotrol), metformin (generic) and insulin injections: Humalog/Novolog SSI, but failed due to renal impairment. Fasting blood sugars range from 90s-200s. Patient checks blood sugars multiple times per day- has Freestyle monitor. Most recent HgA1c was 10.1% (2020) which is worsened from previous value of 9.6% (2019). His most recent TSH was 2.710. LDL is 65. Renal function is decreased, but improving (GFR= 43). The patient has evidence of end organ damage including nephropathy, peripheral neuropathy and cardiovascular disease. He does see a Podiatrist for foot care- Dr. Johnny Lake- saw on 20. He does follow with Endo, but hasn't seen in a while. Last eye exam was unknown. Hypertension   The patient presents today for follow up of HTN. The problem is well controlled. Risk factors for coronary artery disease include Age > 27, male, previous MI, HTN, diabetes and elevated cholesterol. Current treatments include amlodipine (Norvase) and metoprolol (Lopressor, Toprol). The patient is compliant all of the time. Lifestyle changes the patient has made include none. Today the patient is complaining of none.         Hyperlipidemia  The 10-year ASCVD risk score (Ferimn García., et al., 2013) is: 44.8%    Values used to calculate the score:      Age: 65 years      Sex: Male      Is Non-  Diabetes mellitus ([facility] [address]     GERD (gastroesophageal reflux disease) 2016    Hyperlipidemia     Hypertension     Obesity      Past Surgical History:   Procedure Laterality Date    COLONOSCOPY      CORONARY ARTERY BYPASS GRAFT  11/23/2016    x4    ENDOSCOPY, COLON, DIAGNOSTIC      SKIN CANCER EXCISION  2017    SCC BERN- SCC 2018 LEFT EAR/NOSE/LEFT ARM     Family History   Problem Relation Age of Onset    Alzheimer's Disease Mother     Heart Disease Mother     Diabetes Mother     Heart Disease Father     Diabetes Paternal Grandmother      Social History     Socioeconomic History    Marital status:      Spouse name: Not on file    Number of children: Not on file    Years of education: Not on file    Highest education level: Not on file   Occupational History    Not on file   Social Needs    Financial resource strain: Not on file    Food insecurity     Worry: Not on file     Inability: Not on file    Transportation needs     Medical: Not on file     Non-medical: Not on file   Tobacco Use    Smoking status: Former Smoker     Types: Cigarettes     Last attempt to quit:      Years since quittin.6    Smokeless tobacco: Former User     Quit date: 1999   Substance and Sexual Activity    Alcohol use: No    Drug use: No    Sexual activity: Not on file   Lifestyle    Physical activity     Days per week: Not on file     Minutes per session: Not on file    Stress: Not on file   Relationships    Social connections     Talks on phone: Not on file     Gets together: Not on file     Attends Orthodoxy service: Not on file     Active member of club or organization: Not on file     Attends meetings of clubs or organizations: Not on file     Relationship status: Not on file    Intimate partner violence     Fear of current or ex partner: Not on file     Emotionally abused: Not on file     Physically abused: Not on file     Forced sexual activity: Not on file   Other Topics Concern    Not on file   Social History Narrative    Not on file       Vitals:    08/05/20 1001   BP: 138/76   Pulse: 70   Temp: 97.4 °F (36.3 °C)   SpO2: 98%   Weight: 224 lb (101.6 kg)       Physical Exam:  Physical Exam  Vitals signs and nursing note reviewed. Constitutional:       General: He is not in acute distress. Appearance: Normal appearance. He is well-developed. He is obese. He is not ill-appearing. HENT:      Head: Normocephalic and atraumatic. Right Ear: Hearing and external ear normal.      Left Ear: Hearing and external ear normal.      Nose:      Comments: Wearing mask  Eyes:      General: Lids are normal. No scleral icterus. Extraocular Movements: Extraocular movements intact. Conjunctiva/sclera: Conjunctivae normal.   Neck:      Musculoskeletal: Normal range of motion and neck supple. Thyroid: No thyromegaly. Cardiovascular:      Rate and Rhythm: Normal rate and regular rhythm. Heart sounds: Normal heart sounds. No murmur. Pulmonary:      Effort: Pulmonary effort is normal. No respiratory distress. Breath sounds: Normal breath sounds. No wheezing. Musculoskeletal: Normal range of motion. General: No tenderness or deformity. Right lower leg: No edema. Left lower leg: No edema. Lymphadenopathy:      Cervical: No cervical adenopathy. Skin:     General: Skin is warm and dry. Findings: No rash. Comments: Lipoma noted to mid thoracic spine   Neurological:      General: No focal deficit present. Mental Status: He is alert and oriented to person, place, and time. Gait: Gait normal.   Psychiatric:         Mood and Affect: Mood and affect normal.         Speech: Speech normal.         Behavior: Behavior normal.         Thought Content:  Thought content normal.         Labs:  CBC with Differential:    Lab Results   Component Value Date    WBC 12.0 07/18/2020    RBC 5.19 07/18/2020    HGB 16.7 07/18/2020    HCT 47.5 07/18/2020     07/18/2020    MCV 91.5 07/18/2020    MCH 32.3 07/18/2020    MCHC 35.2 07/18/2020    RDW 13.5 07/18/2020    SEGSPCT 85.5 07/18/2020    LYMPHOPCT 5.9 07/18/2020    MONOPCT 5.6 07/18/2020    BASOPCT 0.5 07/18/2020    MONOSABS 0.7 07/18/2020    LYMPHSABS 0.7 07/18/2020    EOSABS 0.3 07/18/2020    BASOSABS 0.1 07/18/2020     CMP:    Lab Results   Component Value Date     07/18/2020    K 3.7 07/18/2020     07/18/2020    CO2 25 07/18/2020    BUN 18 07/18/2020    CREATININE 1.5 07/18/2020    GFRAA 52 03/17/2020    AGRATIO 1.1 07/18/2020    LABGLOM 46 07/18/2020    GLUCOSE 156 07/18/2020    PROT 8.7 07/18/2020    LABALBU 4.5 07/18/2020    CALCIUM 9.4 07/18/2020    BILITOT 1.2 07/18/2020    ALKPHOS 79 07/18/2020    AST 25 07/18/2020    ALT 29 07/18/2020     Uric Acid:    Lab Results   Component Value Date    LABURIC 4.1 03/17/2020     HgBA1c:    Lab Results   Component Value Date    LABA1C 10.1 03/17/2020     Microalbumen/Creatinine ratio:  No components found for: RUCREAT  FLP:    Lab Results   Component Value Date    TRIG 195 03/17/2020    HDL 34 03/17/2020    LDLCALC 65 03/17/2020    LABVLDL 39 03/17/2020     TSH:    Lab Results   Component Value Date    TSH 2.710 03/17/2020     PSA:   Lab Results   Component Value Date    PSA 0.36 09/30/2019        Assessment and Plan:  Naila Gallegos was seen today for established new doctor. Diagnoses and all orders for this visit:    Type 2 diabetes mellitus with diabetic polyneuropathy, with long-term current use of insulin (HCC)  -     insulin detemir (LEVEMIR FLEXTOUCH) 100 UNIT/ML injection pen; Inject 25 Units into the skin every morning (before breakfast) AND 35 Units nightly. -     blood glucose test strips (ASCENSIA AUTODISC VI;ONE TOUCH ULTRA TEST VI) strip; 1 each by In Vitro route daily As needed.   -     Insulin Pen Needle (B-D UF III MINI PEN NEEDLES) 31G X 5 MM MISC; USE AS DIRECTED AS NEEDED FOR  INSULIN  ADMINISTRATION  -     [facility]

## 2020-09-01 ENCOUNTER — TELEPHONE (OUTPATIENT)
Dept: PHARMACY | Facility: CLINIC | Age: 70
End: 2020-09-01

## 2020-09-01 NOTE — TELEPHONE ENCOUNTER
CLINICAL PHARMACY: ADHERENCE REVIEW  Identified care gap per [employer]; fills at [facility]: Statin adherence    Last Office Visit: 8/5/20    Patient also appears to be taking: Atorvastatin 40mg,      ASSESSMENT  STATIN ADHERENCE  PDC: 75%    Per [address]   Atorvastatin last filled on 5/28/20 for a 90 day supply. 2 refills remaining. Billed through [email or web address]     Lab Results   Component Value Date    CHOL 138 03/17/2020    TRIG 195 (H) 03/17/2020    HDL 34 03/17/2020    LDLCALC 65 03/17/2020     ALT   Date Value Ref Range Status   08/05/2020 24 0 - 40 U/L Final     AST   Date Value Ref Range Status   08/05/2020 17 0 - 39 U/L Final     The 10-year ASCVD risk score (Karmen Rivera, et al., 2013) is: 44.8%    Values used to calculate the score:      Age: 65 years      Sex: Male      Is Non- : No      Diabetic: Yes      Tobacco smoker: No      Systolic Blood Pressure: 970 mmHg      Is BP treated: Yes      HDL Cholesterol: 34 mg/dL      Total Cholesterol: 138 mg/dL     PLAN  No patient out reach planned at this time. Patient appears to be adherent and filling a 90ds. His atorvastatin is now ready for pickup and once this fill is picked up he'll have 1rr of 90. CLINICAL PHARMACY CONSULT: MED RECONCILIATION/REVIEW ADDENDUM    For Pharmacy Admin Tracking Only    PHSO: Yes  Total # of Interventions Recommended: 1  - New Order #: 0 New Medication Order Reason(s):  Adherence  - Maintenance Safety Lab Monitoring #: 1  - New Therapy Lab Monitoring #: 0  Recommended intervention potential cost savings: 0  Total Interventions Accepted: 0  Time Spent (min): Janie Maldonado

## 2020-10-26 ENCOUNTER — PROCEDURE VISIT (OUTPATIENT)
Dept: PODIATRY | Age: 70
End: 2020-10-26
Payer: MEDICARE

## 2020-10-26 VITALS
SYSTOLIC BLOOD PRESSURE: 149 MMHG | WEIGHT: 230 LBS | BODY MASS INDEX: 32.08 KG/M2 | TEMPERATURE: 98.2 F | DIASTOLIC BLOOD PRESSURE: 85 MMHG

## 2020-10-26 PROCEDURE — 11721 DEBRIDE NAIL 6 OR MORE: CPT | Performed by: PODIATRIST

## 2020-10-26 ASSESSMENT — ENCOUNTER SYMPTOMS
RESPIRATORY NEGATIVE: 1
EYES NEGATIVE: 1

## 2020-10-26 NOTE — PROGRESS NOTES
[address] [place] [id number]  Dept: [phone]  Dept Fax: [phone]    DIABETIC NAIL PROGRESS NOTE  Date of patient's visit: 10/26/2020  Patient's Name:  Rosendo Kaye YOB: 1950            Patient Care Team:  Marizol Oreilly DO as PCP - General (Family Medicine)  Marizol Oreilly DO as PCP - [facility]  Roxy Lau DPM as Consulting Physician (Podiatry)          Chief Complaint   Patient presents with    Toe Pain     saw pcp Dr. Riana Camara on 8/5/2020    Diabetes       Subjective: Rosendo Kaye comes to clinic for Toe Pain (saw pcp Dr. Riana Camara on 8/5/2020) and Diabetes    he is a diabetic and states that diabetic foot exam .  Pt currently has complaint of thickened, elongated nails that they cannot manage by themselves. Pt's primary care physician is Marizol Oreilly DO last seen   . 4-1--2020     Lab Results   Component Value Date    LABA1C 9.7 (H) 08/05/2020      Complains of numbness in the feet bilat. Past Medical History:   Diagnosis Date    CAD (coronary artery disease)     Diabetes mellitus ([address]     GERD (gastroesophageal reflux disease) 11/18/2016    Hyperlipidemia     Hypertension     Obesity        Allergies   Allergen Reactions    Clindamycin Hcl      C. Diff.  Cymbalta [Duloxetine Hcl]     Lactose Intolerance (Gi)     Metformin      Current Outpatient Medications on File Prior to Visit   Medication Sig Dispense Refill    atorvastatin (LIPITOR) 40 MG tablet Take 1 tablet by mouth daily 90 tablet 1    insulin detemir (LEVEMIR FLEXTOUCH) 100 UNIT/ML injection pen Inject 25 Units into the skin every morning (before breakfast) AND 35 Units nightly.  54 mL 1    metoprolol succinate (TOPROL XL) 25 MG extended release tablet Take 1 tablet by mouth daily 90 tablet 1    pantoprazole (PROTONIX) 40 MG tablet Take 1 tablet by mouth daily 90 tablet 1    pregabalin (LYRICA) 75 MG capsule Take 1 capsule by mouth 2 times daily for 90 days. 180 capsule 1    QUEtiapine (SEROQUEL) 25 MG tablet Take 1 tablet by mouth daily as needed for Other (insomnia) 90 tablet 1    blood glucose test strips (ASCENSIA AUTODISC VI;ONE TOUCH ULTRA TEST VI) strip 1 each by In Vitro route daily As needed. 200 each 5    Insulin Pen Needle (B-D UF III MINI PEN NEEDLES) 31G X 5 MM MISC USE AS DIRECTED AS NEEDED FOR  INSULIN  ADMINISTRATION 300 each 0    amLODIPine (NORVASC) 10 MG tablet TAKE 1 TABLET BY MOUTH ONCE DAILY      promethazine (PHENERGAN) 25 MG tablet TAKE 1 TABLET BY MOUTH EVERY 6 HOURS      Continuous Blood Gluc  (FREESTYLE [place] 14 DAY READER) JOSSE Use as directed to monitor blood glucose 1 Device 1    Continuous Blood Gluc Sensor (FREESTYLE [place] 14 DAY SENSOR) MISC Use as directed to monitor blood glucose 6 each 1    Dulaglutide (TRULICITY) 1.5 KU/3.4AS SOPN Inject 1.5 mg into the skin      vitamin C (ASCORBIC ACID) 500 MG tablet Take 500 mg by mouth daily      ferrous sulfate 325 (65 Fe) MG tablet Take 325 mg by mouth every other day      loratadine (CLARITIN) 10 MG tablet Take 10 mg by mouth      aspirin 81 MG chewable tablet Take 81 mg by mouth daily       No current facility-administered medications on file prior to visit. Review of Systems   Constitutional: Negative. HENT: Negative. Eyes: Negative. Respiratory: Negative. Cardiovascular: Negative. Endocrine: Negative. Genitourinary: Negative. Review of Systems:   History obtained from chart review and the patient  General ROS: negative for - chills, fatigue, fever, night sweats or weight gain  Constitutional: Negative for chills, diaphoresis, fatigue, fever and unexpected weight change. Musculoskeletal: Positive for arthralgias, gait problem and joint swelling. Neurological ROS: negative for - behavioral changes, confusion, headaches or seizures.  Negative for weakness and numbness. Dermatological ROS: negative for - mole changes, rash  Cardiovascular: Negative for leg swelling. Gastrointestinal: Negative for constipation, diarrhea, nausea and vomiting. Objective:  General: AAO x 3 in NAD. Derm  Toenail Description nails are thick and mycotic yellow incurvated causing pain with shoe gear  Sites of Onychomycosis Involvement (Check affected area)  [x] [x] [x] [x] [x] [x] [x] [x] [x] [x]  5 4 3 2 1 1 2 3 4 5                          Right                                        Left    Thickness  [x] [x] [x] [x] [x] [x] [x] [x] [x] [x]  5 4 3 2 1 1 2 3 4 5                         Right                                        Left    Dystrophic Changes   [x] [x] [x] [x] [x] [x] [x] [x] [x] [x]  5 4 3 2 1 1 2 3 4 5                         Right                                        Left    Color   [x] [x] [x] [x] [x] [x] [x] [x] [x] [x]  5 4 3 2 1 1 2 3 4 5                          Right                                        Left    Incurvation/Ingrowin   [x] [x] [x] [x] [x] [x] [x] [x] [x] [x]  5 4 3 2 1 1 2 3 4 5                         Right                                        Left    Inflammation/Pain   [x] [x] [x] [x] [x] [x] [x] [x] [x] [x]  5 4 3 2 1 1 2 3 4 5                         Right                                        Left      Dermatologic Exam:  Skin lesion/ulceration .    Skin   Loss of hair  lower extremity      Musculoskeletal:     1st MPJ ROM decreased, Bilateral.  Muscle Bilateral.  Pain present upon palpation of toenails 1-5, Bilateral. decreased medial longitudinal arch, Bilateral.  Ankle ROM decreased,Bilateral.    Dorsally contracted digits     Vascular: DP and PT pulses absnet  CFT < seconds, Bilateral.  Hair growth absent to the level of the digits, Bilateral.  Edema  Bilateral.  Varicosities  Bilateral.     Neurological: Sensation diminshed to light touch to level of digits, Bilateral.  Protective sensation  sites via 5.07/10g [place] Monofilament, Bilateral.  negative Tinel's, Bilateral.  negative Valleix sign, Bilateral.      Integument: nails   thickened > 3.0 mm, dystrophic and crumbly, discolored with subungual debris. Toes cool to touch    Visual inspection:  Deformity: hammertoe deformity rachael feet  amputation: absent    Edema:   Sensory exam:  Monofilament sensation: abnormal - 6/10 via SW 5.07/10g monofilament to the plantar foot bilateral feet    Pulses:     Pinprick: Impaired  Proprioception: Impaired  Vibration (128 Hz): Impaired       DM with PVD       [x]Yes    []no    Foot Exam    General  General Appearance: appears stated age and healthy   Orientation: alert and oriented to person, place, and time       Right Foot/Ankle     Inspection and Palpation  Skin Exam: skin changes and abnormal color; (There is loss of hair to lower extremity mild discoloration to the lower extremity coolness to touch to the toes nails are thick and yellow dystrophic)    Neurovascular  Dorsalis pedis: 2+  Posterior tibial: absent      Left Foot/Ankle      Inspection and Palpation  Skin Exam: skin changes; Neurovascular  Dorsalis pedis: 2+  Posterior tibial: absent             Ortho Exam      Assessment:  65 y.o. male with:  1. Tinea unguium    2. Type 2 diabetes mellitus without complication, with long-term current use of insulin ([address]    3. Peripheral vascular disease, unspecified ([address]    4. Pain in left toe(s)    5. Pain in toe of right foot     No orders of the defined types were placed in this encounter. Q7   []Yes    []No                Q8   [x]Yes    []No                     Q9   []Yes    []No    Plan:   Pt was evaluated and examined. Patient was given personalized discharge instructions. Nails 1-10 were debrided sharply in length and thickness with a nipper and , without incident. Pt will follow up in 3 months or sooner if any problems arise.  Diagnosis was discussed with the pt and all of their questions were answered in detail. Proper foot hygiene and care was discussed with the pt. Informed patient on proper diabetic foot care and importance of tight glycemic control. Patient to check feet daily and contact the office with any questions/problems/concerns. Other comorbidity noted and will be managed by PCP. 10/26/2020    Electronically signed by Milagros Lora DPM on 10/26/2020 at 2:12 PM  10/26/2020     It was discussed in detail with the patient proper hygiene for the diabetic foot. They are to get into a habit of looking at their feet or have someone look at them. If they are unable to daily, they are to look for any signs of redness, blistering, cracking, swelling, drainage, open lesions, etc.  They are to dry in-between the toes after each bath or shower gently. The water should be tested with their elbow to prevent burns. The patient is to refrain from soaking their feet unless instructed by myself to do otherwise. They are to refrain from going barefoot. Shoe gear should be inspected for any foreign objects. Shoes should have a deep, wide toe box area. With any type of shoe, the feet should be inspected for any signs of pressure, i.e., redness, blistering, or open lesions. The patient was instructed to contact myself or other health care provider with any questions.

## 2020-11-09 ENCOUNTER — OFFICE VISIT (OUTPATIENT)
Dept: PRIMARY CARE CLINIC | Age: 70
End: 2020-11-09
Payer: MEDICARE

## 2020-11-09 VITALS
SYSTOLIC BLOOD PRESSURE: 122 MMHG | OXYGEN SATURATION: 97 % | HEIGHT: 71 IN | DIASTOLIC BLOOD PRESSURE: 68 MMHG | BODY MASS INDEX: 31.64 KG/M2 | WEIGHT: 226 LBS | HEART RATE: 67 BPM | TEMPERATURE: 95.5 F

## 2020-11-09 DIAGNOSIS — Z79.4 TYPE 2 DIABETES MELLITUS WITHOUT COMPLICATION, WITH LONG-TERM CURRENT USE OF INSULIN (HCC): ICD-10-CM

## 2020-11-09 DIAGNOSIS — E11.9 TYPE 2 DIABETES MELLITUS WITHOUT COMPLICATION, WITH LONG-TERM CURRENT USE OF INSULIN (HCC): ICD-10-CM

## 2020-11-09 LAB — HBA1C MFR BLD: 9.8 % (ref 4–5.6)

## 2020-11-09 PROCEDURE — 99214 OFFICE O/P EST MOD 30 MIN: CPT | Performed by: FAMILY MEDICINE

## 2020-11-09 RX ORDER — DULAGLUTIDE 1.5 MG/.5ML
1.5 INJECTION, SOLUTION SUBCUTANEOUS WEEKLY
Qty: 5 PEN | Refills: 3 | Status: SHIPPED
Start: 2020-11-09 | End: 2021-06-03 | Stop reason: SDUPTHER

## 2020-11-09 RX ORDER — PREGABALIN 75 MG/1
75 CAPSULE ORAL 2 TIMES DAILY
COMMUNITY
End: 2020-11-09 | Stop reason: SDUPTHER

## 2020-11-09 RX ORDER — PREGABALIN 100 MG/1
100 CAPSULE ORAL 2 TIMES DAILY
Qty: 60 CAPSULE | Refills: 5 | Status: SHIPPED
Start: 2020-11-09 | End: 2021-02-25

## 2020-11-09 RX ORDER — INSULIN DETEMIR 100 [IU]/ML
INJECTION, SOLUTION SUBCUTANEOUS NIGHTLY
COMMUNITY
End: 2021-03-29

## 2020-11-09 ASSESSMENT — ENCOUNTER SYMPTOMS
VOMITING: 0
BACK PAIN: 0
DIARRHEA: 0
ABDOMINAL PAIN: 0
COUGH: 0
NAUSEA: 0
SHORTNESS OF BREATH: 0
CONSTIPATION: 0
WHEEZING: 0

## 2020-11-09 NOTE — PROGRESS NOTES
20  Larissa Hamilton [facility] : 1950 Sex: male  Age: 65 y.o. Chief Complaint   Patient presents with    Diabetes     3m follow up     HPI:  65 y.o. male patient presents today for 3 month(s) follow up of chronic medical conditions, medication refills and FBW. Patient's chart, medical, surgical and medication history all reviewed. Diabetes Mellitus  65 y.o. male presents for follow up of type 2 diabetes. Current diabetic medications include: insulin injections: Levemir 20/40U and Jardiance and Trulicity. Did not increase insulin as instructed last OV. Previous medications tried include: oral agents (dual therapy): glipizide (Glucotrol), metformin (generic) and insulin injections: Humalog/Novolog SSI, but failed due to renal impairment. Fasting blood sugars range from 90s-200s. Patient checks blood sugars at random- does not check on routine basis. Most recent HgA1c was 10.1% (2020) which is worsened from previous value of 9.6% (2019). Did not complete repeat labs as requested. His most recent TSH was 2.710. LDL is 65. Renal function is decreased, but improving (GFR= 43). The patient has evidence of end organ damage including nephropathy, peripheral neuropathy and cardiovascular disease. He does see a Podiatrist for foot care- Dr. Artemus Osgood- saw on 10/26/20. Last eye exam was unknown. He follows with [facility]- last seen on 10/22/20- Jardiance added. Hypertension   The patient presents today for follow up of HTN. The problem is well controlled. Risk factors for coronary artery disease include Age > 27, male, previous MI, HTN, diabetes and elevated cholesterol. Current treatments include amlodipine (Norvasc) and metoprolol (Lopressor, Toprol). The patient is compliant all of the time. Lifestyle changes the patient has made include none. Today the patient is complaining of none.         Hyperlipidemia  The 10-year ASCVD risk score (Zenon Alegre., et al., 2013) is: 37.9%    Values used to calculate the score:      Age: 65 years      Sex: Male      Is Non- : No      Diabetic: Yes      Tobacco smoker: No      Systolic Blood Pressure: 294 mmHg      Is BP treated: Yes      HDL Cholesterol: 34 mg/dL      Total Cholesterol: 138 mg/dL      ROS:  Review of Systems   Constitutional: Negative for chills, fatigue and fever. Respiratory: Negative for cough, shortness of breath and wheezing. Cardiovascular: Negative for chest pain and palpitations. Gastrointestinal: Negative for abdominal pain, constipation, diarrhea, nausea and vomiting. Musculoskeletal: Negative for arthralgias and back pain. Skin: Negative for rash. Neurological: Positive for numbness. Negative for dizziness and headaches. Psychiatric/Behavioral: Negative for dysphoric mood. The patient is not nervous/anxious. All other systems reviewed and are negative. Current Outpatient Medications on File Prior to Visit   Medication Sig Dispense Refill    insulin detemir (LEVEMIR FLEXTOUCH) 100 UNIT/ML injection pen Inject into the skin nightly 20 units in the AM 40 units PM      empagliflozin (JARDIANCE) 25 MG tablet Take 25 mg by mouth daily (with breakfast)      atorvastatin (LIPITOR) 40 MG tablet Take 1 tablet by mouth daily 90 tablet 1    metoprolol succinate (TOPROL XL) 25 MG extended release tablet Take 1 tablet by mouth daily 90 tablet 1    pantoprazole (PROTONIX) 40 MG tablet Take 1 tablet by mouth daily 90 tablet 1    QUEtiapine (SEROQUEL) 25 MG tablet Take 1 tablet by mouth daily as needed for Other (insomnia) 90 tablet 1    blood glucose test strips (ASCENSIA AUTODISC VI;ONE TOUCH ULTRA TEST VI) strip 1 each by In Vitro route daily As needed.  200 each 5    Insulin Pen Needle (B-D UF III MINI PEN NEEDLES) 31G X 5 MM MISC USE AS DIRECTED AS NEEDED FOR  INSULIN  ADMINISTRATION 300 each 0    amLODIPine (NORVASC) 10 MG tablet TAKE 1 TABLET BY MOUTH ONCE DAILY      Continuous Blood Gluc  (SolartrecSTYLE [place] 14 DAY READER) JOSSE Use as directed to monitor blood glucose 1 Device 1    Continuous Blood Gluc Sensor (FREESTYLE [place] 14 DAY SENSOR) MISC Use as directed to monitor blood glucose 6 each 1    vitamin C (ASCORBIC ACID) 500 MG tablet Take 500 mg by mouth daily      ferrous sulfate 325 (65 Fe) MG tablet Take 325 mg by mouth every other day      aspirin 81 MG chewable tablet Take 81 mg by mouth daily       No current facility-administered medications on file prior to visit. Allergies   Allergen Reactions    Clindamycin Hcl      C. Diff.       Cymbalta [Duloxetine Hcl]     Lactose Intolerance (Gi)     Metformin        Past Medical History:   Diagnosis Date    CAD (coronary artery disease)     [facility] mellitus [facility]     GERD (gastroesophageal reflux disease) 11/18/2016    Hyperlipidemia     Hypertension     Obesity      Past Surgical History:   Procedure Laterality Date    COLONOSCOPY      CORONARY ARTERY BYPASS GRAFT  11/23/2016    x4    ENDOSCOPY, COLON, DIAGNOSTIC      SKIN CANCER EXCISION  08/22/2017    [facility]CC BERNAT- [facility] 2018 LEFT EAR/NOSE/LEFT ARM     Family History   Problem Relation Age of Onset    Alzheimer's Disease Mother     Heart Disease Mother     Diabetes Mother     Heart Disease Father     Diabetes Paternal Grandmother      Social History     Socioeconomic History    Marital status:      Spouse name: Not on file    Number of children: Not on file    Years of education: Not on file    Highest education level: Not on file   Occupational History    Not on file   Social Needs    Financial resource strain: Not on file    Food insecurity     Worry: Not on file     Inability: Not on file    Transportation needs     Medical: Not on file     Non-medical: Not on file   Tobacco Use    Smoking status: Former Smoker     Packs/day: 2.50     Years: 44.00     Pack years: 110.00     Types: Cigarettes     Start date: 1954     Last attempt to quit: 1998     Years since quittin.8    Smokeless tobacco: Former User     Quit date: 1999   Substance and Sexual Activity    Alcohol use: No    Drug use: No    Sexual activity: Not on file   Lifestyle    Physical activity     Days per week: Not on file     Minutes per session: Not on file    Stress: Not on file   Relationships    Social connections     Talks on phone: Not on file     Gets together: Not on file     Attends [employer] service: Not on file     Active member of club or organization: Not on file     Attends meetings of clubs or organizations: Not on file     Relationship status: Not on file    Intimate partner violence     Fear of current or ex partner: Not on file     Emotionally abused: Not on file     Physically abused: Not on file     Forced sexual activity: Not on file   Other Topics Concern    Not on file   Social History Narrative    Not on file       Vitals:    20 1007   BP: 122/68   Pulse: 67   Temp: 95.5 °F (35.3 °C)   SpO2: 97%   Weight: 226 lb (102.5 kg)   Height: 5' 11\" (1.803 m)       Physical Exam:  Physical Exam  Vitals signs and nursing note reviewed. Constitutional:       General: He is not in acute distress. Appearance: Normal appearance. He is well-developed. He is obese. He is not ill-appearing. HENT:      Head: Normocephalic and atraumatic. Right Ear: Hearing and external ear normal.      Left Ear: Hearing and external ear normal.      Nose:      Comments: Wearing mask  Eyes:      General: Lids are normal. No scleral icterus. Extraocular Movements: Extraocular movements intact. Conjunctiva/sclera: Conjunctivae normal.   Neck:      Musculoskeletal: Normal range of motion and neck supple. Thyroid: No thyromegaly. Cardiovascular:      Rate and Rhythm: Normal rate and regular rhythm. Heart sounds: Normal heart sounds. No murmur. Pulmonary:      Effort: Pulmonary effort is normal. No respiratory distress.       Breath sounds: Normal [facility] 65 03/17/2020    LABVLDL 39 03/17/2020     TSH:    Lab Results   Component Value Date    TSH 2.710 03/17/2020     PSA:   Lab Results   Component Value Date    PSA 0.36 09/30/2019        Assessment and Plan:  Suzy Gomez was seen today for diabetes. Diagnoses and all orders for this visit:    Type 2 diabetes mellitus without complication, with long-term current use of insulin (HCC)  -     Dulaglutide (TRULICITY) 1.5 NI/9.3DC SOPN; Inject 1.5 mg into the skin once a week  -     Hemoglobin A1C; Future  Due for repeat A1c at this time. Non-compliant with medication administration. Diabetic polyneuropathy associated with type 2 diabetes mellitus (HCC)  -     pregabalin (LYRICA) 100 MG capsule; Take 1 capsule by mouth 2 times daily for 30 days. Patient having worsening neuropathy due to uncontrolled DM2. Asking for increased Lyrica at this time. Benign essential hypertension  Stable    Coronary artery disease involving native coronary artery of native heart without angina pectoris        Return in about 6 months (around 5/9/2021) for Recheck.       Seen By:  Tatyana Samayoa DO

## 2020-12-04 ENCOUNTER — OFFICE VISIT (OUTPATIENT)
Dept: FAMILY MEDICINE CLINIC | Age: 70
End: 2020-12-04
Payer: MEDICARE

## 2020-12-04 VITALS
WEIGHT: 229 LBS | BODY MASS INDEX: 32.06 KG/M2 | HEART RATE: 71 BPM | DIASTOLIC BLOOD PRESSURE: 80 MMHG | HEIGHT: 71 IN | OXYGEN SATURATION: 96 % | TEMPERATURE: 97.1 F | SYSTOLIC BLOOD PRESSURE: 126 MMHG

## 2020-12-04 PROCEDURE — 99213 OFFICE O/P EST LOW 20 MIN: CPT | Performed by: PHYSICIAN ASSISTANT

## 2020-12-04 NOTE — PROGRESS NOTES
skin once a week, Disp: 5 pen, Rfl: 3    pregabalin (LYRICA) 100 MG capsule, Take 1 capsule by mouth 2 times daily for 30 days. , Disp: 60 capsule, Rfl: 5    atorvastatin (LIPITOR) 40 MG tablet, Take 1 tablet by mouth daily, Disp: 90 tablet, Rfl: 1    metoprolol succinate (TOPROL XL) 25 MG extended release tablet, Take 1 tablet by mouth daily, Disp: 90 tablet, Rfl: 1    pantoprazole (PROTONIX) 40 MG tablet, Take 1 tablet by mouth daily, Disp: 90 tablet, Rfl: 1    QUEtiapine (SEROQUEL) 25 MG tablet, Take 1 tablet by mouth daily as needed for Other (insomnia), Disp: 90 tablet, Rfl: 1    blood glucose test strips (ASCENSIA AUTODISC VI;ONE TOUCH ULTRA TEST VI) strip, 1 each by In Vitro route daily As needed. , Disp: 200 each, Rfl: 5    Insulin Pen Needle (B-D UF III MINI PEN NEEDLES) 31G X 5 MM MISC, USE AS DIRECTED AS NEEDED FOR  INSULIN  ADMINISTRATION, Disp: 300 each, Rfl: 0    amLODIPine (NORVASC) 10 MG tablet, TAKE 1 TABLET BY MOUTH ONCE DAILY, Disp: , Rfl:     Continuous Blood Gluc  (FREESTYLE [place] 14 DAY READER) JOSSE, Use as directed to monitor blood glucose, Disp: 1 Device, Rfl: 1    Continuous Blood Gluc Sensor (FREESTYLE [place] 14 DAY SENSOR) [facility], Use as directed to monitor blood glucose, Disp: 6 each, Rfl: 1    vitamin C (ASCORBIC ACID) 500 MG tablet, Take 500 mg by mouth daily, Disp: , Rfl:     ferrous sulfate 325 (65 Fe) MG tablet, Take 325 mg by mouth every other day, Disp: , Rfl:     aspirin 81 MG chewable tablet, Take 81 mg by mouth daily, Disp: , Rfl:    Allergies   Allergen Reactions    Clindamycin Hcl      C. Diff.  Cymbalta [Duloxetine Hcl]     Lactose Intolerance (Gi)     Metformin         Objective:  Vitals:    12/04/20 1539   BP: 126/80   Pulse: 71   Temp: 97.1 °F (36.2 °C)   SpO2: 96%   Weight: 229 lb (103.9 kg)   Height: 5' 11\" (1.803 m)        Exam:  Const: Appears healthy and well developed. No signs of acute distress present.   Head/Face: Head is normocephalic, atraumatic. Facies is symmetric. ENMT: Buccal mucosa moist.  Neck: Trachea midline. Resp: No respiratory distress. Musculo: Pulses are equal bilaterally. The patient has full ROM of the digit. Good flexion and extension of the digit. Good capillary refill. Skin:Dry and warm. The patient has avulsion of the skin to his left thumb difficult to measure due to the fact that the Gelfoam is still attached. There is no surrounding erythema there is no warmth or red streaking or adenopathy noted. Neuro: Alert and oriented x3. Speech is intact. No sensory deficits. Neurovascularly intact   psych: Mood and affect are appropriate to situation. The wound was cleansed with peroxide antibiotic dressing was applied to the wound. Wound care instructions given. Follow-up with PCP for recheck in 5 to 7 days. Taylor Grene was seen today for wound check. Diagnoses and all orders for this visit:    Avulsion of skin of left thumb, initial encounter        Return for Follow up with PCP in 5 days for re-evaluation. .    Seen By:    LOGAN Clark

## 2021-01-23 RX ORDER — FLASH GLUCOSE SENSOR
KIT MISCELLANEOUS
Qty: 6 EACH | Refills: 11 | Status: SHIPPED | OUTPATIENT
Start: 2021-01-23 | End: 2022-03-24

## 2021-01-25 ENCOUNTER — PROCEDURE VISIT (OUTPATIENT)
Dept: PODIATRY | Age: 71
End: 2021-01-25
Payer: MEDICARE

## 2021-01-25 VITALS
WEIGHT: 225 LBS | BODY MASS INDEX: 31.38 KG/M2 | DIASTOLIC BLOOD PRESSURE: 80 MMHG | TEMPERATURE: 97.8 F | SYSTOLIC BLOOD PRESSURE: 133 MMHG

## 2021-01-25 DIAGNOSIS — M79.675 PAIN IN LEFT TOE(S): ICD-10-CM

## 2021-01-25 DIAGNOSIS — M79.674 PAIN IN TOE OF RIGHT FOOT: ICD-10-CM

## 2021-01-25 DIAGNOSIS — I73.9 PERIPHERAL VASCULAR DISEASE, UNSPECIFIED (HCC): ICD-10-CM

## 2021-01-25 DIAGNOSIS — Z79.4 TYPE 2 DIABETES MELLITUS WITHOUT COMPLICATION, WITH LONG-TERM CURRENT USE OF INSULIN (HCC): ICD-10-CM

## 2021-01-25 DIAGNOSIS — B35.1 TINEA UNGUIUM: Primary | ICD-10-CM

## 2021-01-25 DIAGNOSIS — E11.9 TYPE 2 DIABETES MELLITUS WITHOUT COMPLICATION, WITH LONG-TERM CURRENT USE OF INSULIN (HCC): ICD-10-CM

## 2021-01-25 PROCEDURE — 11721 DEBRIDE NAIL 6 OR MORE: CPT | Performed by: PODIATRIST

## 2021-01-25 ASSESSMENT — ENCOUNTER SYMPTOMS
EYES NEGATIVE: 1
RESPIRATORY NEGATIVE: 1

## 2021-01-25 NOTE — PROGRESS NOTES
[address] [place], [place] [address] [place] [id number]  Dept: [phone]  Dept Fax: [phone]    DIABETIC NAIL PROGRESS NOTE  Date of patient's visit: 1/25/2021  Patient's Name:  Laquita Rahman YOB: 1950            Patient Care Team:  Roseanne Sanches DO as PCP - General (Family Medicine)  Roseanne Sanches DO as PCP - [facility] Empaneled Provider  Tammy Gonzales DPM as Consulting Physician (Podiatry)          Chief Complaint   Patient presents with    Diabetes     saw pcp Dr. Roseanne Sanches on 11/9/2020    Toe Pain       Subjective: Laquita Rahman comes to clinic for Diabetes (saw pcp Dr. Roseanne Sanches on 11/9/2020) and Toe Pain    he is a diabetic and states that diabetic foot exam .  Pt currently has complaint of thickened, elongated nails that they cannot manage by themselves. Pt's primary care physician is Roseanne Sanches DO last seen   . 4-1--2020     Lab Results   Component Value Date    LABA1C 9.8 (H) 11/09/2020      Complains of numbness in the feet bilat. Past Medical History:   Diagnosis Date    CAD (coronary artery disease)     Diabetes mellitus ([facility] [address]     GERD (gastroesophageal reflux disease) 11/18/2016    Hyperlipidemia     Hypertension     Obesity        Allergies   Allergen Reactions    Clindamycin Hcl      C. Diff.       Cymbalta [Duloxetine Hcl]     Lactose Intolerance (Gi)     Metformin      Current Outpatient Medications on File Prior to Visit   Medication Sig Dispense Refill    Continuous Blood Gluc Sensor (FREESTYLE [place]OR[place] 14 DAY SENSOR) [facility] USE AS DIRECTED 6 each 11    insulin detemir (LEVEMIR FLEXTOUCH) 100 UNIT/ML injection pen Inject into the skin nightly 20 units in the AM 40 units PM      empagliflozin (JARDIANCE) 25 MG tablet Take 25 mg by mouth daily (with breakfast)      Dulaglutide (TRULICITY) 1.5 XT/0.9QC SOPN Inject 1.5 mg into the skin once a week 5 pen 3    atorvastatin (LIPITOR) 40 MG headaches or seizures. Negative for weakness and numbness. Dermatological ROS: negative for - mole changes, rash  Cardiovascular: Negative for leg swelling. Gastrointestinal: Negative for constipation, diarrhea, nausea and vomiting. Objective:  General: AAO x 3 in NAD. Derm  Toenail Description nails are thick and mycotic yellow incurvated causing pain with shoe gear  Sites of Onychomycosis Involvement (Check affected area)  [x] [x] [x] [x] [x] [x] [x] [x] [x] [x]  5 4 3 2 1 1 2 3 4 5                          Right                                        Left    Thickness  [x] [x] [x] [x] [x] [x] [x] [x] [x] [x]  5 4 3 2 1 1 2 3 4 5                         Right                                        Left    Dystrophic Changes   [x] [x] [x] [x] [x] [x] [x] [x] [x] [x]  5 4 3 2 1 1 2 3 4 5                         Right                                        Left    Color   [x] [x] [x] [x] [x] [x] [x] [x] [x] [x]  5 4 3 2 1 1 2 3 4 5                          Right                                        Left    Incurvation/Ingrowin   [x] [x] [x] [x] [x] [x] [x] [x] [x] [x]  5 4 3 2 1 1 2 3 4 5                         Right                                        Left    Inflammation/Pain   [x] [x] [x] [x] [x] [x] [x] [x] [x] [x]  5 4 3 2 1 1 2 3 4 5                         Right                                        Left      Dermatologic Exam:  Skin lesion/ulceration .    Skin   Loss of hair  lower extremity      Musculoskeletal:     1st MPJ ROM decreased, Bilateral.  Muscle Bilateral.  Pain present upon palpation of toenails 1-5, Bilateral. decreased medial longitudinal arch, Bilateral.  Ankle ROM decreased,Bilateral.    Dorsally contracted digits     Vascular: DP and PT pulses absnet  CFT < seconds, Bilateral.  Hair growth absent to the level of the digits, Bilateral.  Edema  Bilateral.  Varicosities  Bilateral.     Neurological: Sensation diminshed to light touch to level of digits, Bilateral. Protective sensation  sites via 5.07/10g [place] Monofilament, Bilateral.  negative Tinel's, Bilateral.  negative Valleix sign, Bilateral.      Integument: nails   thickened > 3.0 mm, dystrophic and crumbly, discolored with subungual debris. Toes cool to touch    Visual inspection:  Deformity: hammertoe deformity rachael feet  amputation: absent    Edema:   Sensory exam:  Monofilament sensation: abnormal - 6/10 via SW 5.07/10g monofilament to the plantar foot bilateral feet    Pulses:     Pinprick: Impaired  Proprioception: Impaired  Vibration (128 Hz): Impaired       DM with PVD       [x]Yes    []no    Foot Exam    General  General Appearance: appears stated age and healthy   Orientation: alert and oriented to person, place, and time       Right Foot/Ankle     Inspection and Palpation  Skin Exam: skin changes and abnormal color; (There is loss of hair to lower extremity mild discoloration to the lower extremity coolness to touch to the toes nails are thick and yellow dystrophic)    Neurovascular  Dorsalis pedis: 2+  Posterior tibial: absent      Left Foot/Ankle      Inspection and Palpation  Skin Exam: skin changes; Neurovascular  Dorsalis pedis: 2+  Posterior tibial: absent             Ortho Exam      Assessment:  65 y.o. male with:  1. Tinea unguium    2. Type 2 diabetes mellitus without complication, with long-term current use of insulin ([address]    3. Peripheral vascular disease, unspecified ([address]    4. Pain in left toe(s)    5. Pain in toe of right foot       Orders Placed This Encounter   Procedures     DIABETES FOOT EXAM           Q7   []Yes    []No                Q8   [x]Yes    []No                     Q9   []Yes    []No    Plan:   Pt was evaluated and examined. Patient was given personalized discharge instructions. Nails 1-10 were debrided sharply in length and thickness with a nipper and , without incident. Pt will follow up in 3 months or sooner if any problems arise.  Diagnosis was

## 2021-02-24 NOTE — PROGRESS NOTES
 aspirin 81 MG chewable tablet Take 81 mg by mouth daily       No current facility-administered medications on file prior to visit. Allergies   Allergen Reactions    Clindamycin Hcl      C. Diff.  Cymbalta [Duloxetine Hcl]     Lactose Intolerance (Gi)     Metformin        Past Medical History:   Diagnosis Date    CAD (coronary artery disease)     Diabetes mellitus ([address]     GERD (gastroesophageal reflux disease) 11/18/2016    Hyperlipidemia     Hypertension     Obesity      Past Surgical History:   Procedure Laterality Date    COLONOSCOPY      CORONARY ARTERY BYPASS GRAFT  11/23/2016    x4    ENDOSCOPY, COLON, DIAGNOSTIC      SKIN CANCER EXCISION  08/22/2017    SCC BERNAT- SCC 2018 LEFT EAR/NOSE/LEFT ARM     Family History   Problem Relation Age of Onset    Alzheimer's Disease Mother     Heart Disease Mother     Diabetes Mother     Heart Disease Father     Diabetes Paternal Grandmother      Social History     Tobacco History     Smoking Status  Former Smoker Smoking Start Date  1/1/1954 Quit date  1/1/1998 Smoking Frequency  2.5 packs/day for 40 years (110 pk yrs)    Smoking Tobacco Type  Cigarettes    Smokeless Tobacco Use  Former User Quit date  1/22/1999                 There were no vitals filed for this visit. Physical Exam:  Physical Exam     Assessment and Plan:  There are no diagnoses linked to this encounter. No follow-ups on file.       Seen By:  Horacio Geronimo, DO

## 2021-02-25 ENCOUNTER — OFFICE VISIT (OUTPATIENT)
Dept: PRIMARY CARE CLINIC | Age: 71
End: 2021-02-25
Payer: MEDICARE

## 2021-02-25 VITALS
HEART RATE: 60 BPM | OXYGEN SATURATION: 97 % | BODY MASS INDEX: 32.48 KG/M2 | SYSTOLIC BLOOD PRESSURE: 146 MMHG | WEIGHT: 232 LBS | DIASTOLIC BLOOD PRESSURE: 78 MMHG | TEMPERATURE: 97.4 F | HEIGHT: 71 IN

## 2021-02-25 DIAGNOSIS — Z12.5 SCREENING FOR PROSTATE CANCER: ICD-10-CM

## 2021-02-25 DIAGNOSIS — D17.1 LIPOMA OF TORSO: ICD-10-CM

## 2021-02-25 DIAGNOSIS — E78.2 MIXED HYPERLIPIDEMIA: ICD-10-CM

## 2021-02-25 DIAGNOSIS — E55.9 VITAMIN D DEFICIENCY: ICD-10-CM

## 2021-02-25 DIAGNOSIS — I10 BENIGN ESSENTIAL HYPERTENSION: ICD-10-CM

## 2021-02-25 DIAGNOSIS — E11.42 TYPE 2 DIABETES MELLITUS WITH DIABETIC POLYNEUROPATHY, WITH LONG-TERM CURRENT USE OF INSULIN (HCC): Primary | ICD-10-CM

## 2021-02-25 DIAGNOSIS — I25.10 CORONARY ARTERY DISEASE INVOLVING NATIVE CORONARY ARTERY OF NATIVE HEART WITHOUT ANGINA PECTORIS: ICD-10-CM

## 2021-02-25 DIAGNOSIS — E11.42 TYPE 2 DIABETES MELLITUS WITH DIABETIC POLYNEUROPATHY, WITH LONG-TERM CURRENT USE OF INSULIN (HCC): ICD-10-CM

## 2021-02-25 DIAGNOSIS — Z79.4 TYPE 2 DIABETES MELLITUS WITH DIABETIC POLYNEUROPATHY, WITH LONG-TERM CURRENT USE OF INSULIN (HCC): ICD-10-CM

## 2021-02-25 DIAGNOSIS — Z79.4 TYPE 2 DIABETES MELLITUS WITH DIABETIC POLYNEUROPATHY, WITH LONG-TERM CURRENT USE OF INSULIN (HCC): Primary | ICD-10-CM

## 2021-02-25 LAB
ALBUMIN SERPL-MCNC: 4.2 G/DL (ref 3.5–5.2)
ALP BLD-CCNC: 105 U/L (ref 40–129)
ALT SERPL-CCNC: 19 U/L (ref 0–40)
ANION GAP SERPL CALCULATED.3IONS-SCNC: 13 MMOL/L (ref 7–16)
AST SERPL-CCNC: 18 U/L (ref 0–39)
BASOPHILS ABSOLUTE: 0.08 E9/L (ref 0–0.2)
BASOPHILS RELATIVE PERCENT: 1.1 % (ref 0–2)
BILIRUB SERPL-MCNC: 0.4 MG/DL (ref 0–1.2)
BUN BLDV-MCNC: 19 MG/DL (ref 8–23)
CALCIUM SERPL-MCNC: 9.6 MG/DL (ref 8.6–10.2)
CHLORIDE BLD-SCNC: 105 MMOL/L (ref 98–107)
CHOLESTEROL, TOTAL: 124 MG/DL (ref 0–199)
CO2: 24 MMOL/L (ref 22–29)
CREAT SERPL-MCNC: 1.8 MG/DL (ref 0.7–1.2)
CREATININE URINE: 70 MG/DL (ref 40–278)
EOSINOPHILS ABSOLUTE: 0.4 E9/L (ref 0.05–0.5)
EOSINOPHILS RELATIVE PERCENT: 5.6 % (ref 0–6)
GFR AFRICAN AMERICAN: 45
GFR NON-AFRICAN AMERICAN: 37 ML/MIN/1.73
GLUCOSE BLD-MCNC: 221 MG/DL (ref 74–99)
HBA1C MFR BLD: 9.4 % (ref 4–5.6)
HCT VFR BLD CALC: 48.1 % (ref 37–54)
HDLC SERPL-MCNC: 29 MG/DL
HEMOGLOBIN: 16 G/DL (ref 12.5–16.5)
IMMATURE GRANULOCYTES #: 0.03 E9/L
IMMATURE GRANULOCYTES %: 0.4 % (ref 0–5)
LDL CHOLESTEROL CALCULATED: 59 MG/DL (ref 0–99)
LYMPHOCYTES ABSOLUTE: 1.44 E9/L (ref 1.5–4)
LYMPHOCYTES RELATIVE PERCENT: 20.1 % (ref 20–42)
MCH RBC QN AUTO: 31.1 PG (ref 26–35)
MCHC RBC AUTO-ENTMCNC: 33.3 % (ref 32–34.5)
MCV RBC AUTO: 93.4 FL (ref 80–99.9)
MICROALBUMIN UR-MCNC: 36.8 MG/L
MICROALBUMIN/CREAT UR-RTO: 52.6 (ref 0–30)
MONOCYTES ABSOLUTE: 0.71 E9/L (ref 0.1–0.95)
MONOCYTES RELATIVE PERCENT: 9.9 % (ref 2–12)
NEUTROPHILS ABSOLUTE: 4.52 E9/L (ref 1.8–7.3)
NEUTROPHILS RELATIVE PERCENT: 62.9 % (ref 43–80)
PDW BLD-RTO: 13.2 FL (ref 11.5–15)
PLATELET # BLD: 231 E9/L (ref 130–450)
PMV BLD AUTO: 11.3 FL (ref 7–12)
POTASSIUM SERPL-SCNC: 4.9 MMOL/L (ref 3.5–5)
PROSTATE SPECIFIC ANTIGEN: 0.33 NG/ML (ref 0–4)
RBC # BLD: 5.15 E12/L (ref 3.8–5.8)
SODIUM BLD-SCNC: 142 MMOL/L (ref 132–146)
TOTAL PROTEIN: 7.9 G/DL (ref 6.4–8.3)
TRIGL SERPL-MCNC: 179 MG/DL (ref 0–149)
TSH SERPL DL<=0.05 MIU/L-ACNC: 2.41 UIU/ML (ref 0.27–4.2)
VITAMIN D 25-HYDROXY: 32 NG/ML (ref 30–100)
VLDLC SERPL CALC-MCNC: 36 MG/DL
WBC # BLD: 7.2 E9/L (ref 4.5–11.5)

## 2021-02-25 PROCEDURE — 99214 OFFICE O/P EST MOD 30 MIN: CPT | Performed by: FAMILY MEDICINE

## 2021-02-25 RX ORDER — PREGABALIN 100 MG/1
100 CAPSULE ORAL 2 TIMES DAILY
COMMUNITY
End: 2021-05-17

## 2021-02-25 ASSESSMENT — ENCOUNTER SYMPTOMS
ABDOMINAL PAIN: 0
NAUSEA: 0
CONSTIPATION: 0
SHORTNESS OF BREATH: 0
ROS SKIN COMMENTS: MASS ON BACK
WHEEZING: 0
DIARRHEA: 0
BACK PAIN: 0
COUGH: 0
VOMITING: 0

## 2021-02-25 NOTE — PROGRESS NOTES
21  [facility] : 1950 Sex: male  Age: 66 y.o. Chief Complaint   Patient presents with    Mass     on back between shoulder blades    Diabetes    Hypertension     HPI:  66 y.o. male patient presents today for 3 month(s) follow up of chronic medical conditions and FBW. Patient's chart, medical, surgical and medication history all reviewed. Diabetes Mellitus  66 y.o. male presents for follow up of type 2 diabetes. Current diabetic medications include: insulin injections: Levemir 20/40U and Jardiance and Trulicity. Did not increase insulin as instructed last OV. Previous medications tried include: oral agents (dual therapy): glipizide (Glucotrol), metformin (generic) and insulin injections: Humalog/Novolog SSI, but failed due to renal impairment. Fasting blood sugars range from 90s-200s. Patient checks blood sugars at random- does not check on routine basis. Most recent HgA1c was 9.8% (2020)---10.1% (2020) which is worsened from previous value of 9.6% (2019). His most recent TSH was 2.710. LDL is 65. Renal function is decreased, but improving (GFR= 43). The patient has evidence of end organ damage including nephropathy, peripheral neuropathy and cardiovascular disease. He does see a Podiatrist for foot care- Dr. Rosa Ramirez. Last eye exam was unknown. He follows with [facility]- last seen on 2021. Hypertension   The patient presents today for follow up of HTN. The problem is borderline controlled. Did not take medication this AM.  Risk factors for coronary artery disease include Age > 27, male, previous MI, HTN, diabetes and elevated cholesterol. Current treatments include amlodipine (Norvasc) and metoprolol (Lopressor, Toprol). The patient is compliant all of the time. Lifestyle changes the patient has made include none. Today the patient is complaining of none.         Hyperlipidemia  The 10-year ASCVD risk score (Balaji Seay., et al., 2013) is: 50.8%    Values used to calculate the score:      Age: 66 years      Sex: Male      Is Non- : No      Diabetic: Yes      Tobacco smoker: No      Systolic Blood Pressure: 309 mmHg      Is BP treated: Yes      HDL Cholesterol: 34 mg/dL      Total Cholesterol: 138 mg/dL    Patient has lesion on mid thoracic back. Starting to bother him more and wants looked at. ROS:  Review of Systems   Constitutional: Negative for chills, fatigue and fever. Respiratory: Negative for cough, shortness of breath and wheezing. Cardiovascular: Negative for chest pain and palpitations. Gastrointestinal: Negative for abdominal pain, constipation, diarrhea, nausea and vomiting. Musculoskeletal: Negative for arthralgias and back pain. Skin: Negative for rash. Mass on back   Neurological: Positive for numbness. Negative for dizziness and headaches. Psychiatric/Behavioral: Negative for dysphoric mood. The patient is not nervous/anxious. All other systems reviewed and are negative. Current Outpatient Medications on File Prior to Visit   Medication Sig Dispense Refill    pregabalin (LYRICA) 100 MG capsule Take 100 mg by mouth 2 times daily.  1 capsule by mouth 2 times daily      ZINC PO Take by mouth      Continuous Blood Gluc Sensor ([employer]STYLE FLORIDA 14 DAY SENSOR) [facility] USE AS DIRECTED 6 each 11    insulin detemir (LEVEMIR FLEXTOUCH) 100 UNIT/ML injection pen Inject into the skin nightly 20 units in the AM 40 units PM      empagliflozin (JARDIANCE) 25 MG tablet Take 25 mg by mouth daily (with breakfast)      Dulaglutide (TRULICITY) 1.5 XH/8.1PR SOPN Inject 1.5 mg into the skin once a week 5 pen 3    atorvastatin (LIPITOR) 40 MG tablet Take 1 tablet by mouth daily 90 tablet 1    metoprolol succinate (TOPROL XL) 25 MG extended release tablet Take 1 tablet by mouth daily 90 tablet 1    pantoprazole (PROTONIX) 40 MG tablet Take 1 tablet by mouth daily 90 tablet 1    QUEtiapine (SEROQUEL) 25 MG tablet Take 1 tablet by mouth daily as needed for Other (insomnia) 90 tablet 1    blood glucose test strips (ASCENSIA AUTODISC VI;ONE TOUCH ULTRA TEST VI) strip 1 each by In Vitro route daily As needed. 200 each 5    Insulin Pen Needle (B-D UF III MINI PEN NEEDLES) 31G X 5 MM MISC USE AS DIRECTED AS NEEDED FOR  INSULIN  ADMINISTRATION 300 each 0    amLODIPine (NORVASC) 10 MG tablet TAKE 1 TABLET BY MOUTH ONCE DAILY      Continuous Blood Gluc  (FREESTYLE [place]OR[place] 14 DAY READER) JOSSE Use as directed to monitor blood glucose 1 Device 1    vitamin C (ASCORBIC ACID) 500 MG tablet Take 500 mg by mouth daily      ferrous sulfate 325 (65 Fe) MG tablet Take 325 mg by mouth every other day      aspirin 81 MG chewable tablet Take 81 mg by mouth daily      [DISCONTINUED] Continuous Blood Gluc Sensor (FREESTYLE [place]OR[place] 14 DAY SENSOR) MISC Use as directed to monitor blood glucose 6 each 1     No current facility-administered medications on file prior to visit. Allergies   Allergen Reactions    Clindamycin Hcl      C. Diff.       Cymbalta [Duloxetine Hcl]     Lactose Intolerance (Gi)     Metformin        Past Medical History:   Diagnosis Date    CAD (coronary artery disease)     [facility] mellitus ([facility] [address]     GERD (gastroesophageal reflux disease) 11/18/2016    Hyperlipidemia     Hypertension     Obesity      Past Surgical History:   Procedure Laterality Date    COLONOSCOPY      CORONARY ARTERY BYPASS GRAFT  11/23/2016    x4    ENDOSCOPY, COLON, DIAGNOSTIC      SKIN CANCER EXCISION  08/22/2017    SCC BERNAT- SCC 2018 LEFT EAR/NOSE/LEFT ARM     Family History   Problem Relation Age of Onset    Alzheimer's Disease Mother     Heart Disease Mother     Diabetes Mother     Heart Disease Father     Diabetes Paternal Grandmother      Social History     Socioeconomic History    Marital status:      Spouse name: Not on file    Number of children: Not on file    Years of education: Not on file    Highest education level: Not on file   Occupational History    Not on file   Social Needs    Financial resource strain: Not on file    Food insecurity     Worry: Not on file     Inability: Not on file    Transportation needs     Medical: Not on file     Non-medical: Not on file   Tobacco Use    Smoking status: Former Smoker     Packs/day: 2.50     Years: 44.00     Pack years: 110.00     Types: Cigarettes     Start date:      Quit date:      Years since quittin.1    Smokeless tobacco: Former User     Quit date: 1999   Substance and Sexual Activity    Alcohol use: No    Drug use: No    Sexual activity: Not on file   Lifestyle    Physical activity     Days per week: Not on file     Minutes per session: Not on file    Stress: Not on file   Relationships    Social connections     Talks on phone: Not on file     Gets together: Not on file     Attends Buddhist service: Not on file     Active member of club or organization: Not on file     Attends meetings of clubs or organizations: Not on file     Relationship status: Not on file    Intimate partner violence     Fear of current or ex partner: Not on file     Emotionally abused: Not on file     Physically abused: Not on file     Forced sexual activity: Not on file   Other Topics Concern    Not on file   Social History Narrative    Not on file       Vitals:    21 0924 21 0951   BP: (!) 162/82 (!) 146/78   Pulse: 60    Temp: 97.4 °F (36.3 °C)    SpO2: 97%    Weight: 232 lb (105.2 kg)    Height: 5' 11\" (1.803 m)        Physical Exam:  Physical Exam  Vitals signs and nursing note reviewed. Constitutional:       General: He is not in acute distress. Appearance: Normal appearance. He is well-developed. He is obese. He is not ill-appearing. HENT:      Head: Normocephalic and atraumatic.       Right Ear: Hearing and external ear normal.      Left Ear: Hearing and external ear normal.      Nose:      Comments: Wearing mask  Eyes:      General: Lids are normal. No scleral icterus. Extraocular Movements: Extraocular movements intact. Conjunctiva/sclera: Conjunctivae normal.   Neck:      Musculoskeletal: Normal range of motion and neck supple. Thyroid: No thyromegaly. Cardiovascular:      Rate and Rhythm: Normal rate and regular rhythm. Heart sounds: Normal heart sounds. No murmur. Pulmonary:      Effort: Pulmonary effort is normal. No respiratory distress. Breath sounds: Normal breath sounds. No wheezing. Musculoskeletal: Normal range of motion. General: No tenderness or deformity. Right lower leg: No edema. Left lower leg: No edema. Lymphadenopathy:      Cervical: No cervical adenopathy. Skin:     General: Skin is warm and dry. Findings: No rash. Comments: Lipoma noted to mid thoracic spine   Neurological:      General: No focal deficit present. Mental Status: He is alert and oriented to person, place, and time. Gait: Gait normal.   Psychiatric:         Mood and Affect: Mood and affect normal.         Speech: Speech normal.         Behavior: Behavior normal.         Thought Content:  Thought content normal.         Labs:  CBC with Differential:    Lab Results   Component Value Date    WBC 8.1 08/05/2020    RBC 5.14 08/05/2020    HGB 15.8 08/05/2020    HCT 48.2 08/05/2020     08/05/2020    MCV 93.8 08/05/2020    MCH 30.7 08/05/2020    MCHC 32.8 08/05/2020    RDW 13.0 08/05/2020    SEGSPCT 85.5 07/18/2020    LYMPHOPCT 19.1 08/05/2020    MONOPCT 7.9 08/05/2020    BASOPCT 1.0 08/05/2020    MONOSABS 0.64 08/05/2020    LYMPHSABS 1.54 08/05/2020    EOSABS 0.42 08/05/2020    BASOSABS 0.08 08/05/2020     CMP:    Lab Results   Component Value Date     08/05/2020    K 4.4 08/05/2020     08/05/2020    CO2 20 08/05/2020    BUN 18 08/05/2020    CREATININE 1.5 08/05/2020    GFRAA 56 08/05/2020    AGRATIO 1.1 07/18/2020    LABGLOM 46 08/05/2020

## 2021-03-02 DIAGNOSIS — E78.2 MIXED HYPERLIPIDEMIA: ICD-10-CM

## 2021-03-02 RX ORDER — ATORVASTATIN CALCIUM 40 MG/1
40 TABLET, FILM COATED ORAL DAILY
Qty: 90 TABLET | Refills: 3 | Status: SHIPPED
Start: 2021-03-02 | End: 2021-06-03 | Stop reason: SDUPTHER

## 2021-03-02 NOTE — TELEPHONE ENCOUNTER
Bethany Newton, DO    Patient is out of refills for atorvastatin and is due to refill. I have pended a refill request for your convenience. Last OV on 2/25/21. Next OV on 5/10/21. Thank you,   Hildegarde Habermann, PharmD, [place]  Direct: [phone]  Department, toll free [phone], option 7    =======================================================================    POPULATION HEALTH CLINICAL PHARMACY REVIEW: ADHERENCE REVIEW  Identified care gap per [employer]; fills at [facility]: Statin adherence    Last Office Visit: 2/25/21    Patient also appears to be prescribed: Jardiance, Trulicity Humalog and Levemir. Patient not found in [facility] [place]    ASSESSMENT  DIABETES ADHERENCE  José Miguel Aguilar: n/a; prescribed insulin     Lab Results   Component Value Date    LABA1C 9.4 (H) 02/25/2021    LABA1C 9.8 (H) 11/09/2020    LABA1C 9.7 (H) 08/05/2020     NOTE A1c >9%. Per 2/25/21 OV note: \"Did not increase insulin as instructed last OV\". Per 2/25/21 A1c lab note: \"Please let patient know that I reviewed his labs.  A1c is at 9.4% from 9.8% and kidney function is reduced from previous check.  Everything else looks stable.  I did forward the labs to Dr. José Miguel Márquez can discuss with him changes that he would like to make to his medications. \" Appears patient sees outside endocrinologist (see June for OV notes)    STATIN ADHERENCE  (2020 José Miguel Aguilar = 75%)     Per Insurance Records through 12/20/20:   Atorvastatin last filled on 5/28/20 for a 90 day supply. Per Reconciled Dispense Report:   Atorvastatin filled on 8/31/20 #90 and 12/1/20 #90. (patient adherent)    Per [facility]:   Atorvastatin last picked up on 12/1/2020 for 90 day supply. 0 refills remaining. Billed through [employer].      Lab Results   Component Value Date    CHOL 124 02/25/2021    TRIG 179 (H) 02/25/2021    HDL 29 02/25/2021    LDLCALC 59 02/25/2021     ALT   Date Value Ref Range Status 02/25/2021 19 0 - 40 U/L Final     AST   Date Value Ref Range Status   02/25/2021 18 0 - 39 U/L Final     The ASCVD Risk score (Brenda Asencio, et al., 2013) failed to calculate for the following reasons: The valid total cholesterol range is 130 to 320 mg/dL     PLAN  No patient out reach planned at this time. Appears patient switched to [place] on 8/1/20, which explains why he appears to be non-adherent to atorvastatin therapy. Will prepare and send a refill request to PCP today.      Joaquin Benavidez, PharmD, [facility]  Direct: [phone]  Department, toll free [phone], option 7

## 2021-03-03 NOTE — TELEPHONE ENCOUNTER
Thank you for the quick response! Will sign off at this time.      Rosales Perkins, PharmD, [address]  Direct: [phone]  Department, toll free [phone], option 7           For Pharmacy Admin Tracking Only    PHSO: Yes  Total # of Interventions Recommended: 1  - Refills Provided #: 1  - Maintenance Safety Lab Monitoring #: 1  Recommended intervention potential cost savings: 1  Total Interventions Accepted: 1  Time Spent (min): 15

## 2021-04-26 ENCOUNTER — PROCEDURE VISIT (OUTPATIENT)
Dept: PODIATRY | Age: 71
End: 2021-04-26
Payer: MEDICARE

## 2021-04-26 VITALS
SYSTOLIC BLOOD PRESSURE: 136 MMHG | DIASTOLIC BLOOD PRESSURE: 82 MMHG | TEMPERATURE: 98.2 F | BODY MASS INDEX: 31.8 KG/M2 | WEIGHT: 228 LBS

## 2021-04-26 DIAGNOSIS — E11.9 TYPE 2 DIABETES MELLITUS WITHOUT COMPLICATION, WITH LONG-TERM CURRENT USE OF INSULIN (HCC): ICD-10-CM

## 2021-04-26 DIAGNOSIS — M79.675 PAIN IN LEFT TOE(S): ICD-10-CM

## 2021-04-26 DIAGNOSIS — I73.9 PERIPHERAL VASCULAR DISEASE, UNSPECIFIED (HCC): ICD-10-CM

## 2021-04-26 DIAGNOSIS — Z79.4 TYPE 2 DIABETES MELLITUS WITHOUT COMPLICATION, WITH LONG-TERM CURRENT USE OF INSULIN (HCC): ICD-10-CM

## 2021-04-26 DIAGNOSIS — M79.674 PAIN IN TOE OF RIGHT FOOT: ICD-10-CM

## 2021-04-26 DIAGNOSIS — B35.1 TINEA UNGUIUM: Primary | ICD-10-CM

## 2021-04-26 PROCEDURE — 11721 DEBRIDE NAIL 6 OR MORE: CPT | Performed by: PODIATRIST

## 2021-04-26 ASSESSMENT — ENCOUNTER SYMPTOMS
BACK PAIN: 1
EYES NEGATIVE: 1
RESPIRATORY NEGATIVE: 1

## 2021-04-26 NOTE — PROGRESS NOTES
[address] [place] [address]  [address] [id number]  Dept: [phone]  Dept Fax: [phone]    DIABETIC NAIL PROGRESS NOTE  Date of patient's visit: 4/26/2021  Patient's Name:  Charity Stovall YOB: 1950            Patient Care Team:  Irene Mcdonald DO as PCP - General (Family Medicine)  Irene Mcdonald DO as PCP - [facility] Empaneled Provider  Milka Jeffries DPM as Consulting Physician (Podiatry)          Chief Complaint   Patient presents with    Diabetes     saw pcp Dr. Irene Mcdonald on 4/22/2021    Toe Pain       Subjective: Charity Stovall comes to clinic for Diabetes (saw pcp Dr. Irene Mcdonald on 4/22/2021) and Toe Pain    he is a diabetic and states that diabetic foot exam .  Pt currently has complaint of thickened, elongated nails that they cannot manage by themselves. Pt's primary care physician is Irene Mcdonald DO last seen   . 4-1--2020     Lab Results   Component Value Date    LABA1C 9.4 (H) 02/25/2021      Complains of numbness in the feet bilat. Past Medical History:   Diagnosis Date    CAD (coronary artery disease)     Diabetes mellitus ([facility] [address]     GERD (gastroesophageal reflux disease) 11/18/2016    Hyperlipidemia     Hypertension     Obesity        Allergies   Allergen Reactions    Clindamycin Hcl      C. Diff.  Cymbalta [Duloxetine Hcl]     Lactose Intolerance (Gi)     Metformin      Current Outpatient Medications on File Prior to Visit   Medication Sig Dispense Refill    insulin detemir (LEVEMIR FLEXTOUCH) 100 UNIT/ML injection pen Inject 25 Units into the skin every morning (before breakfast) AND 35 Units nightly.  54 mL 3    pantoprazole (PROTONIX) 40 MG tablet Take 1 tablet by mouth once daily 90 tablet 1    QUEtiapine (SEROQUEL) 25 MG tablet TAKE 1 TABLET BY MOUTH ONCE DAILY AS NEEDED FOR INSOMNIA 90 tablet 1    metoprolol succinate (TOPROL XL) 25 MG extended release tablet Take 1 tablet by mouth once daily 90 tablet 1    Insulin Pen Needle (B-D UF III MINI PEN NEEDLES) 31G X 5 MM MISC USE AS DIRECTED FOR  INSULIN  ADMINISTRATION 300 each 5    atorvastatin (LIPITOR) 40 MG tablet Take 1 tablet by mouth daily 90 tablet 3    pregabalin (LYRICA) 100 MG capsule Take 100 mg by mouth 2 times daily. 1 capsule by mouth 2 times daily      ZINC PO Take by mouth      Continuous Blood Gluc Sensor (FREESTYLE [place] 14 DAY SENSOR) MISC USE AS DIRECTED 6 each 11    empagliflozin (JARDIANCE) 25 MG tablet Take 25 mg by mouth daily (with breakfast)      Dulaglutide (TRULICITY) 1.5 TE/2.3MQ SOPN Inject 1.5 mg into the skin once a week 5 pen 3    blood glucose test strips (ASCENSIA AUTODISC VI;ONE TOUCH ULTRA TEST VI) strip 1 each by In Vitro route daily As needed. 200 each 5    amLODIPine (NORVASC) 10 MG tablet TAKE 1 TABLET BY MOUTH ONCE DAILY      Continuous Blood Gluc  (FREESTYLE FLORIDA 14 DAY READER) JOSSE Use as directed to monitor blood glucose 1 Device 1    vitamin C (ASCORBIC ACID) 500 MG tablet Take 500 mg by mouth daily      ferrous sulfate 325 (65 Fe) MG tablet Take 325 mg by mouth every other day      aspirin 81 MG chewable tablet Take 81 mg by mouth daily      [DISCONTINUED] Continuous Blood Gluc Sensor (FREESTYLE FLORIDA 14 DAY SENSOR) MISC Use as directed to monitor blood glucose 6 each 1     No current facility-administered medications on file prior to visit. Review of Systems   Constitutional: Negative. HENT: Negative. Eyes: Negative. Respiratory: Negative. Cardiovascular: Negative. Endocrine: Negative. Musculoskeletal: Positive for arthralgias, back pain and gait problem. Review of Systems:   History obtained from chart review and the patient  General ROS: negative for - chills, fatigue, fever, night sweats or weight gain  Constitutional: Negative for chills, diaphoresis, fatigue, fever and unexpected weight change.   Musculoskeletal: Positive for arthralgias, gait problem and joint swelling. Neurological ROS: negative for - behavioral changes, confusion, headaches or seizures. Negative for weakness and numbness. Dermatological ROS: negative for - mole changes, rash  Cardiovascular: Negative for leg swelling. Gastrointestinal: Negative for constipation, diarrhea, nausea and vomiting. Objective:  General: AAO x 3 in NAD. Derm  Toenail Description nails are thick and mycotic yellow incurvated causing pain with shoe gear  Sites of Onychomycosis Involvement (Check affected area)  [x] [x] [x] [x] [x] [x] [x] [x] [x] [x]  5 4 3 2 1 1 2 3 4 5                          Right                                        Left    Thickness  [x] [x] [x] [x] [x] [x] [x] [x] [x] [x]  5 4 3 2 1 1 2 3 4 5                         Right                                        Left    Dystrophic Changes   [x] [x] [x] [x] [x] [x] [x] [x] [x] [x]  5 4 3 2 1 1 2 3 4 5                         Right                                        Left    Color   [x] [x] [x] [x] [x] [x] [x] [x] [x] [x]  5 4 3 2 1 1 2 3 4 5                          Right                                        Left    Incurvation/Ingrowin   [x] [x] [x] [x] [x] [x] [x] [x] [x] [x]  5 4 3 2 1 1 2 3 4 5                         Right                                        Left    Inflammation/Pain   [x] [x] [x] [x] [x] [x] [x] [x] [x] [x]  5 4 3 2 1 1 2 3 4 5                         Right                                        Left      Dermatologic Exam:  Skin lesion/ulceration .    Skin   Loss of hair  lower extremity      Musculoskeletal:     1st MPJ ROM decreased, Bilateral.  Muscle Bilateral.  Pain present upon palpation of toenails 1-5, Bilateral. decreased medial longitudinal arch, Bilateral.  Ankle ROM decreased,Bilateral.    Dorsally contracted digits     Vascular: DP and PT pulses absnet  CFT < seconds, Bilateral.  Hair growth absent to the level of the digits, Bilateral.  Edema  Bilateral. Varicosities  Bilateral.     Neurological: Sensation diminshed to light touch to level of digits, Bilateral.  Protective sensation  sites via 5.07/10g [place] Monofilament, Bilateral.  negative Tinel's, Bilateral.  negative Valleix sign, Bilateral.      Integument: nails   thickened > 3.0 mm, dystrophic and crumbly, discolored with subungual debris. Toes cool to touch    Visual inspection:  Deformity: hammertoe deformity rachael feet  amputation: absent    Edema:   Sensory exam:  Monofilament sensation: abnormal - 6/10 via SW 5.07/10g monofilament to the plantar foot bilateral feet    Pulses:     Pinprick: Impaired  Proprioception: Impaired  Vibration (128 Hz): Impaired       DM with PVD       [x]Yes    []no    Foot Exam    General  General Appearance: appears stated age and healthy   Orientation: alert and oriented to person, place, and time       Right Foot/Ankle     Inspection and Palpation  Skin Exam: skin changes and abnormal color; (There is loss of hair to lower extremity mild discoloration to the lower extremity coolness to touch to the toes nails are thick and yellow dystrophic)    Neurovascular  Dorsalis pedis: 2+  Posterior tibial: absent      Left Foot/Ankle      Inspection and Palpation  Skin Exam: skin changes; Neurovascular  Dorsalis pedis: 2+  Posterior tibial: absent             Ortho Exam      Assessment:  66 y.o. male with:  1. Tinea unguium    2. Type 2 diabetes mellitus without complication, with long-term current use of insulin ([address]    3. Peripheral vascular disease, unspecified ([address]    4. Pain in left toe(s)    5. Pain in toe of right foot       Orders Placed This Encounter   Procedures     DIABETES FOOT EXAM           Q7   []Yes    []No                Q8   [x]Yes    []No                     Q9   []Yes    []No    Plan:   Pt was evaluated and examined. Patient was given personalized discharge instructions.   Nails 1-10 were debrided sharply in length and thickness with a nipper

## 2021-06-03 ENCOUNTER — OFFICE VISIT (OUTPATIENT)
Dept: PRIMARY CARE CLINIC | Age: 71
End: 2021-06-03
Payer: MEDICARE

## 2021-06-03 VITALS
HEIGHT: 71 IN | SYSTOLIC BLOOD PRESSURE: 120 MMHG | OXYGEN SATURATION: 97 % | HEART RATE: 74 BPM | BODY MASS INDEX: 31.22 KG/M2 | WEIGHT: 223 LBS | TEMPERATURE: 95.7 F | DIASTOLIC BLOOD PRESSURE: 68 MMHG

## 2021-06-03 DIAGNOSIS — E11.42 TYPE 2 DIABETES MELLITUS WITH DIABETIC POLYNEUROPATHY, WITH LONG-TERM CURRENT USE OF INSULIN (HCC): Primary | ICD-10-CM

## 2021-06-03 DIAGNOSIS — K21.9 GASTROESOPHAGEAL REFLUX DISEASE WITHOUT ESOPHAGITIS: ICD-10-CM

## 2021-06-03 DIAGNOSIS — E78.2 MIXED HYPERLIPIDEMIA: ICD-10-CM

## 2021-06-03 DIAGNOSIS — D50.9 IRON DEFICIENCY ANEMIA, UNSPECIFIED IRON DEFICIENCY ANEMIA TYPE: ICD-10-CM

## 2021-06-03 DIAGNOSIS — I10 BENIGN ESSENTIAL HYPERTENSION: ICD-10-CM

## 2021-06-03 DIAGNOSIS — Z91.81 AT HIGH RISK FOR FALLS: ICD-10-CM

## 2021-06-03 DIAGNOSIS — M25.551 ACUTE RIGHT HIP PAIN: ICD-10-CM

## 2021-06-03 DIAGNOSIS — E11.42 DIABETIC POLYNEUROPATHY ASSOCIATED WITH TYPE 2 DIABETES MELLITUS (HCC): ICD-10-CM

## 2021-06-03 DIAGNOSIS — Z79.4 TYPE 2 DIABETES MELLITUS WITH DIABETIC POLYNEUROPATHY, WITH LONG-TERM CURRENT USE OF INSULIN (HCC): Primary | ICD-10-CM

## 2021-06-03 DIAGNOSIS — E55.9 VITAMIN D DEFICIENCY: ICD-10-CM

## 2021-06-03 DIAGNOSIS — R26.81 GAIT INSTABILITY: ICD-10-CM

## 2021-06-03 DIAGNOSIS — F34.1 DYSTHYMIA: ICD-10-CM

## 2021-06-03 PROCEDURE — 99214 OFFICE O/P EST MOD 30 MIN: CPT | Performed by: FAMILY MEDICINE

## 2021-06-03 RX ORDER — ATORVASTATIN CALCIUM 40 MG/1
40 TABLET, FILM COATED ORAL DAILY
Qty: 90 TABLET | Refills: 1 | Status: SHIPPED
Start: 2021-06-03 | End: 2021-09-20

## 2021-06-03 RX ORDER — PEN NEEDLE, DIABETIC 31 GX5/16"
NEEDLE, DISPOSABLE MISCELLANEOUS
Qty: 300 EACH | Refills: 5 | Status: SHIPPED
Start: 2021-06-03 | End: 2022-03-24 | Stop reason: SDUPTHER

## 2021-06-03 RX ORDER — DULAGLUTIDE 1.5 MG/.5ML
1.5 INJECTION, SOLUTION SUBCUTANEOUS WEEKLY
Qty: 15 PEN | Refills: 1 | Status: SHIPPED
Start: 2021-06-03 | End: 2021-11-24 | Stop reason: DRUGHIGH

## 2021-06-03 RX ORDER — QUETIAPINE FUMARATE 25 MG/1
TABLET, FILM COATED ORAL
Qty: 90 TABLET | Refills: 1 | Status: SHIPPED
Start: 2021-06-03 | End: 2022-03-24 | Stop reason: SDUPTHER

## 2021-06-03 RX ORDER — METOPROLOL SUCCINATE 25 MG/1
TABLET, EXTENDED RELEASE ORAL
Qty: 90 TABLET | Refills: 1 | Status: SHIPPED
Start: 2021-06-03 | End: 2022-01-19

## 2021-06-03 RX ORDER — AMLODIPINE BESYLATE 10 MG/1
TABLET ORAL
Qty: 90 TABLET | Refills: 1 | Status: SHIPPED
Start: 2021-06-03 | End: 2022-03-24 | Stop reason: SDUPTHER

## 2021-06-03 RX ORDER — INSULIN DETEMIR 100 [IU]/ML
INJECTION, SOLUTION SUBCUTANEOUS
Qty: 162 ML | Refills: 1 | Status: SHIPPED
Start: 2021-06-03 | End: 2022-03-24

## 2021-06-03 RX ORDER — PANTOPRAZOLE SODIUM 40 MG/1
TABLET, DELAYED RELEASE ORAL
Qty: 90 TABLET | Refills: 1 | Status: SHIPPED
Start: 2021-06-03 | End: 2022-03-24 | Stop reason: SDUPTHER

## 2021-06-03 ASSESSMENT — ENCOUNTER SYMPTOMS
COUGH: 0
CONSTIPATION: 0
DIARRHEA: 0
ROS SKIN COMMENTS: MASS ON BACK
NAUSEA: 0
SHORTNESS OF BREATH: 0
VOMITING: 0
BACK PAIN: 0
ABDOMINAL PAIN: 0
WHEEZING: 0

## 2021-06-03 ASSESSMENT — PATIENT HEALTH QUESTIONNAIRE - PHQ9
1. LITTLE INTEREST OR PLEASURE IN DOING THINGS: 0
SUM OF ALL RESPONSES TO PHQ9 QUESTIONS 1 & 2: 0
SUM OF ALL RESPONSES TO PHQ QUESTIONS 1-9: 0
2. FEELING DOWN, DEPRESSED OR HOPELESS: 0
SUM OF ALL RESPONSES TO PHQ QUESTIONS 1-9: 0
SUM OF ALL RESPONSES TO PHQ QUESTIONS 1-9: 0

## 2021-06-03 NOTE — PROGRESS NOTES
6/3/21  Shon Alfaro : 1950 Sex: male  Age: 66 y.o. Chief Complaint   Patient presents with    Hip Pain     R hip - fell a while ago and hip just isnt getting better     Diabetes    Hypertension     HPI:  66 y.o. male patient presents today for 3 month(s) follow up of chronic medical conditions, medication refills and FBW. Patient's chart, medical, surgical and medication history all reviewed. Diabetes Mellitus  66 y.o. male presents for follow up of type 2 diabetes. Current diabetic medications include: insulin injections: Levemir 20/40U and Jardiance and Trulicity. Did not increase insulin as instructed last OV. Previous medications tried include: oral agents (dual therapy): glipizide (Glucotrol), metformin (generic) and insulin injections: Humalog/Novolog SSI, but failed due to renal impairment. Most recent HgA1c was 9.4% (2021)---9.8% (2020)---10.1% (2020)---9.6% (2019). His most recent TSH was 2.410. LDL is 59. Renal function is decreased (GFR= 37). The patient has evidence of end organ damage including nephropathy, peripheral neuropathy and cardiovascular disease. He does see a Podiatrist for foot care- Dr. Truong Whitten. Last eye exam was unknown. He follows with [facility]- last seen on 2021. Hypertension   The patient presents today for follow up of HTN. The problem is well controlled. Did not take medication this AM.  Risk factors for coronary artery disease include Age > 27, male, previous MI, HTN, diabetes and elevated cholesterol. Current treatments include amlodipine (Norvasc) and metoprolol (Lopressor, Toprol). The patient is compliant all of the time. Lifestyle changes the patient has made include none. Today the patient is complaining of none. Hyperlipidemia  The ASCVD Risk score (Jaswinder Patel, et al., 2013) failed to calculate for the following reasons:     The valid total cholesterol range is 130 to 320 mg/dL    Hip Pain  Patient complains of right hip pain. Onset of the symptoms was several months ago. Inciting event: fell while getting out of the car. Current symptoms include is worse with weight bearing and is worse after period of inactivity. Associated symptoms: none. Aggravating symptoms: any weight bearing, going up and down stairs and rising after sitting. Patient has had no prior hip problems. Previous visits for this problem: none. Evaluation to date: none. Treatment to date: none. ROS:  Review of Systems   Constitutional: Negative for chills, fatigue and fever. Respiratory: Negative for cough, shortness of breath and wheezing. Cardiovascular: Negative for chest pain and palpitations. Gastrointestinal: Negative for abdominal pain, constipation, diarrhea, nausea and vomiting. Musculoskeletal: Positive for arthralgias. Negative for back pain. Skin: Negative for rash. Mass on back   Neurological: Positive for numbness. Negative for dizziness and headaches. Psychiatric/Behavioral: Negative for dysphoric mood. The patient is not nervous/anxious. All other systems reviewed and are negative. Current Outpatient Medications on File Prior to Visit   Medication Sig Dispense Refill    pregabalin (LYRICA) 100 MG capsule Take 1 capsule by mouth twice daily 180 capsule 0    ZINC PO Take by mouth      Continuous Blood Gluc Sensor (FREESTYLE FLORIDA 14 DAY SENSOR) MISC USE AS DIRECTED 6 each 11    Continuous Blood Gluc  (FREESTYLE FLOR[place] 14 DAY READER) JOSSE Use as directed to monitor blood glucose 1 Device 1    vitamin C (ASCORBIC ACID) 500 MG tablet Take 500 mg by mouth daily      aspirin 81 MG chewable tablet Take 81 mg by mouth daily      [DISCONTINUED] Continuous Blood Gluc Sensor (FREESTYLE FLORIDA 14 DAY SENSOR) MISC Use as directed to monitor blood glucose 6 each 1     No current facility-administered medications on file prior to visit.        Allergies   Allergen Reactions    Clindamycin Hcl      C. Diff.  Cymbalta [Duloxetine Hcl]     Lactose Intolerance (Gi)     Metformin        Past Medical History:   Diagnosis Date    CAD (coronary artery disease)     Diabetes mellitus ([address]     GERD (gastroesophageal reflux disease) 2016    Hyperlipidemia     Hypertension     Obesity      Past Surgical History:   Procedure Laterality Date    COLONOSCOPY      CORONARY ARTERY BYPASS GRAFT  11/23/2016    x4    ENDOSCOPY, COLON, DIAGNOSTIC      SKIN CANCER EXCISION  2017    SCC BERNAT- SCC 2018 LEFT EAR/NOSE/LEFT ARM     Family History   Problem Relation Age of Onset    Alzheimer's Disease Mother     Heart Disease Mother     Diabetes Mother     Heart Disease Father     Diabetes Paternal Grandmother      Social History     Socioeconomic History    Marital status:      Spouse name: Not on file    Number of children: Not on file    Years of education: Not on file    Highest education level: Not on file   Occupational History    Not on file   Tobacco Use    Smoking status: Former Smoker     Packs/day: 2.50     Years: 44.00     Pack years: 110.00     Types: Cigarettes     Start date:      Quit date:      Years since quittin.4    Smokeless tobacco: Former User     Quit date: 1999   Substance and Sexual Activity    Alcohol use: No    Drug use: No    Sexual activity: Not on file   Other Topics Concern    Not on file   Social History Narrative    Not on file     Social Determinants of Health     Financial Resource Strain:     Difficulty of Paying Living Expenses:    Food Insecurity:     Worried About Running Out of Food in the Last Year:     [address] in the Last Year:    Transportation Needs:     Lack of Transportation (Medical):      Lack of Transportation (Non-Medical):    Physical Activity:     Days of Exercise per Week:     Minutes of Exercise per Session:    Stress:     Feeling of Stress :    Social Connections:     Frequency of Communication with Friends and Family:     Frequency of Social Gatherings with Friends and Family:     Attends Yazdanism Services:     Active Member of Clubs or Organizations:     Attends Club or Organization Meetings:     Marital Status:    Intimate Partner Violence:     Fear of Current or Ex-Partner:     Emotionally Abused:     Physically Abused:     Sexually Abused:        Vitals:    06/03/21 0936   BP: 120/68   Pulse: 74   Temp: 95.7 °F (35.4 °C)   SpO2: 97%   Weight: 223 lb (101.2 kg)   Height: 5' 11\" (1.803 m)       Physical Exam:  Physical Exam  Vitals and nursing note reviewed. Constitutional:       General: He is not in acute distress. Appearance: Normal appearance. He is well-developed. He is obese. He is not ill-appearing. HENT:      Head: Normocephalic and atraumatic. Right Ear: Hearing and external ear normal.      Left Ear: Hearing and external ear normal.      Nose:      Comments: Wearing mask  Eyes:      General: Lids are normal. No scleral icterus. Extraocular Movements: Extraocular movements intact. Conjunctiva/sclera: Conjunctivae normal.   Neck:      Thyroid: No thyromegaly. Cardiovascular:      Rate and Rhythm: Normal rate and regular rhythm. Heart sounds: Normal heart sounds. No murmur heard. Pulmonary:      Effort: Pulmonary effort is normal. No respiratory distress. Breath sounds: Normal breath sounds. No wheezing. Musculoskeletal:         General: No tenderness or deformity. Normal range of motion. Cervical back: Normal range of motion and neck supple. Right lower leg: No edema. Left lower leg: No edema. Lymphadenopathy:      Cervical: No cervical adenopathy. Skin:     General: Skin is warm and dry. Findings: Wound (left ear bandaged from SCC removal) present. No rash. Comments: Lipoma noted to mid thoracic spine   Neurological:      General: No focal deficit present.       Mental Status: He is alert detemir (LEVEMIR FLEXTOUCH) 100 UNIT/ML injection pen; Inject 25 Units into the skin every morning (before breakfast) AND 35 Units nightly. -     Insulin Pen Needle (B-D UF III MINI PEN NEEDLES) 31G X 5 MM MISC; USE AS DIRECTED FOR  INSULIN  ADMINISTRATION  -     Dulaglutide (TRULICITY) 1.5 VX/6.6EI SOPN; Inject 1.5 mg into the skin once a week  -     empagliflozin (JARDIANCE) 25 MG tablet; Take 25 mg by mouth daily (with breakfast)  -     CBC Auto Differential; Future  -     Comprehensive Metabolic Panel; Future  -     Hemoglobin A1C; Future  -     TSH without Reflex; Future  -     Vitamin D 25 Hydroxy; Future  -     Urinalysis; Future  -     Vitamin B12 & Folate; Future  -     Microalbumin / Creatinine Urine Ratio; Future  Uncontrolled. Due for repeat labs at this time. Patient states that sugars have been better since starting Jardiance. Diabetic polyneuropathy associated with type 2 diabetes mellitus ([facility] [address]  -     Handicap [facility]; by Does not apply route Duration:  Lifetime  Exp:  06/2026    Benign essential hypertension  -     metoprolol succinate (TOPROL XL) 25 MG extended release tablet; Take 1 tablet by mouth once daily  -     amLODIPine (NORVASC) 10 MG tablet; TAKE 1 TABLET BY MOUTH ONCE DAILY  Well controlled    Iron deficiency anemia, unspecified iron deficiency anemia type  -     Iron and TIBC; Future  -     Ferritin; Future  Patient states that he \"just don't feel well\". Feels off all the time. Previously on iron. Mixed hyperlipidemia  -     atorvastatin (LIPITOR) 40 MG tablet; Take 1 tablet by mouth daily  -     Lipid Panel; Future    Acute right hip pain  -     XR HIP 2-3 VW W PELVIS RIGHT; Future  -     External Referral To Physical Therapy  -     diclofenac sodium (VOLTAREN) 1 % GEL; Apply 4 g topically 4 times daily as needed for Pain  Check xray. Will send for PT to help with balance due to falls. Topical NSAIDS due to renal impairment.      Gastroesophageal reflux disease

## 2021-07-20 ENCOUNTER — CARE COORDINATION (OUTPATIENT)
Dept: CARE COORDINATION | Age: 71
End: 2021-07-20

## 2021-07-20 NOTE — CARE COORDINATION
-ACM spoke to patient to offer enrollment into Care Coordination program, however he is interested but he is not at home at this time to schedule an appt. for Care Coordination enrollment.   -Pt request if ACM could call him back after 3pm today. ACM agreed.  -Pt reports that he doesn't really follow a DM diet. He said he sees food and he eats it. Pt said he is lax in monitoring carbs and he does not read labels. Pt said he went to DM education but it was too complex for him. Pt said he goes to [employer] almost daily for a bagel and coffee. -Pt reports he has a [employer] continuous monitor. He checks his BS before each meal.  He said his average BS over the past 2 weeks has been 150 or below.    -Pt reports he is also lactose intolerant.    -Pt reports is has been going to PT and water therapy weekly.    -Pt said he has not exercised since the pandemic started. He was going to the [facility] in [place]. He said he is going to start going back there.  -Pt reports he had Covid vaccine x2.  -Pt reports he is interested in ACP specialist for [facility].

## 2021-07-20 NOTE — CARE COORDINATION
--ACM spoke with patient again to further discuss enrollment into Care Coordination program, however Pt said he is busy the next couple weeks and would like a call back the second week of August.  ACM agreed.   -Pt is interested in the dietitian and ACP specialist for [facility].

## 2021-07-26 ENCOUNTER — PROCEDURE VISIT (OUTPATIENT)
Dept: PODIATRY | Age: 71
End: 2021-07-26
Payer: MEDICARE

## 2021-07-26 VITALS
TEMPERATURE: 97.5 F | BODY MASS INDEX: 31.8 KG/M2 | DIASTOLIC BLOOD PRESSURE: 72 MMHG | WEIGHT: 228 LBS | SYSTOLIC BLOOD PRESSURE: 133 MMHG

## 2021-07-26 DIAGNOSIS — M79.675 PAIN IN LEFT TOE(S): ICD-10-CM

## 2021-07-26 DIAGNOSIS — I73.9 PERIPHERAL VASCULAR DISEASE, UNSPECIFIED (HCC): ICD-10-CM

## 2021-07-26 DIAGNOSIS — Z79.4 TYPE 2 DIABETES MELLITUS WITHOUT COMPLICATION, WITH LONG-TERM CURRENT USE OF INSULIN (HCC): Primary | ICD-10-CM

## 2021-07-26 DIAGNOSIS — E11.9 TYPE 2 DIABETES MELLITUS WITHOUT COMPLICATION, WITH LONG-TERM CURRENT USE OF INSULIN (HCC): Primary | ICD-10-CM

## 2021-07-26 DIAGNOSIS — B35.1 TINEA UNGUIUM: ICD-10-CM

## 2021-07-26 DIAGNOSIS — M79.674 PAIN IN TOE OF RIGHT FOOT: ICD-10-CM

## 2021-07-26 PROCEDURE — 11721 DEBRIDE NAIL 6 OR MORE: CPT | Performed by: PODIATRIST

## 2021-07-26 ASSESSMENT — ENCOUNTER SYMPTOMS
BACK PAIN: 1
EYES NEGATIVE: 1
RESPIRATORY NEGATIVE: 1

## 2021-07-26 NOTE — PROGRESS NOTES
[address] [place] [id number]  Dept: [phone]  Dept Fax: [phone]    DIABETIC NAIL PROGRESS NOTE  Date of patient's visit: 7/26/2021  Patient's Name:  Lily Perez YOB: 1950            Patient Care Team:  Kenia Alonzo DO as PCP - General (Family Medicine)  Kenia Alonzo DO as PCP - [facility]  Ilene Nova DPM as Consulting Physician (Podiatry)  Gilma Wilson, NANCY as Ambulatory Care Manager          Chief Complaint   Patient presents with    Toe Pain     saw pcp Dr. Kenia Alonzo 6/3/2021    Diabetes       Subjective: Lily Perez comes to clinic for Toe Pain (saw pcp Dr. Kenia Alonzo 6/3/2021) and Diabetes    he is a diabetic and states that diabetic foot exam .  Pt currently has complaint of thickened, elongated nails that they cannot manage by themselves. Pt's primary care physician is Kenia Alonzo DO last seen   . 4-1--2020     Lab Results   Component Value Date    LABA1C 9.4 (H) 02/25/2021      Complains of numbness in the feet bilat. Past Medical History:   Diagnosis Date    CAD (coronary artery disease)     [facility] mellitus [facility] [address]     GERD (gastroesophageal reflux disease) 11/18/2016    Hyperlipidemia     Hypertension     Obesity        Allergies   Allergen Reactions    Clindamycin Hcl      C. Diff.  Cymbalta [Duloxetine Hcl]     Lactose Intolerance (Gi)     Metformin      Current Outpatient Medications on File Prior to Visit   Medication Sig Dispense Refill    insulin detemir (LEVEMIR FLEXTOUCH) 100 UNIT/ML injection pen Inject 25 Units into the skin every morning (before breakfast) AND 35 Units nightly.  162 mL 1    pantoprazole (PROTONIX) 40 MG tablet Take 1 tablet by mouth once daily 90 tablet 1    QUEtiapine (SEROQUEL) 25 MG tablet TAKE 1 TABLET BY MOUTH ONCE DAILY AS NEEDED FOR INSOMNIA 90 tablet 1    metoprolol succinate (TOPROL XL) 25 MG extended release tablet Take 1 tablet by mouth once daily 90 tablet 1    Insulin Pen Needle (B-D UF III MINI PEN NEEDLES) 31G X 5 MM MISC USE AS DIRECTED FOR  INSULIN  ADMINISTRATION 300 each 5    atorvastatin (LIPITOR) 40 MG tablet Take 1 tablet by mouth daily 90 tablet 1    Dulaglutide (TRULICITY) 1.5 CR/5.5JI SOPN Inject 1.5 mg into the skin once a week 15 pen 1    empagliflozin (JARDIANCE) 25 MG tablet Take 25 mg by mouth daily (with breakfast) 90 tablet 1    amLODIPine (NORVASC) 10 MG tablet TAKE 1 TABLET BY MOUTH ONCE DAILY 90 tablet 1    Handicap Placard MISC by Does not apply route Duration:  Lifetime  Exp:  06/2026 1 each 0    diclofenac sodium (VOLTAREN) 1 % GEL Apply 4 g topically 4 times daily as needed for Pain 100 g 1    pregabalin (LYRICA) 100 MG capsule Take 1 capsule by mouth twice daily 180 capsule 0    ZINC PO Take by mouth      Continuous Blood Gluc Sensor (FREESTYLE [place] 14 DAY SENSOR) MISC USE AS DIRECTED 6 each 11    Continuous Blood Gluc  (FREESTYLE [place]OR[place] 14 DAY READER) JOSSE Use as directed to monitor blood glucose 1 Device 1    vitamin C (ASCORBIC ACID) 500 MG tablet Take 500 mg by mouth daily      aspirin 81 MG chewable tablet Take 81 mg by mouth daily      [DISCONTINUED] Continuous Blood Gluc Sensor (FREESTYLE [place]OR[place] 14 DAY SENSOR) MISC Use as directed to monitor blood glucose 6 each 1     No current facility-administered medications on file prior to visit. Review of Systems   Constitutional: Negative. HENT: Negative. Eyes: Negative. Respiratory: Negative. Cardiovascular: Negative. Endocrine: Negative. Musculoskeletal: Positive for arthralgias, back pain and gait problem. Review of Systems:   History obtained from chart review and the patient  General ROS: negative for - chills, fatigue, fever, night sweats or weight gain  Constitutional: Negative for chills, diaphoresis, fatigue, fever and unexpected weight change.   Musculoskeletal: Bilateral.  Varicosities  Bilateral.     Neurological: Sensation diminshed to light touch to level of digits, Bilateral.  Protective sensation  sites via 5.07/10g [place] Monofilament, Bilateral.  negative Tinel's, Bilateral.  negative Valleix sign, Bilateral.      Integument: nails   thickened > 3.0 mm, dystrophic and crumbly, discolored with subungual debris. Toes cool to touch    Visual inspection:  Deformity: hammertoe deformity rachael feet  amputation: absent    Edema:   Sensory exam:  Monofilament sensation: abnormal - 6/10 via SW 5.07/10g monofilament to the plantar foot bilateral feet    Pulses:     Pinprick: Impaired  Proprioception: Impaired  Vibration (128 Hz): Impaired       DM with PVD       [x]Yes    []no    Foot Exam    General  General Appearance: appears stated age and healthy   Orientation: alert and oriented to person, place, and time       Right Foot/Ankle     Inspection and Palpation  Skin Exam: skin changes and abnormal color; (There is loss of hair to lower extremity mild discoloration to the lower extremity coolness to touch to the toes nails are thick and yellow dystrophic)    Neurovascular  Dorsalis pedis: 2+  Posterior tibial: absent      Left Foot/Ankle      Inspection and Palpation  Skin Exam: skin changes; Neurovascular  Dorsalis pedis: 2+  Posterior tibial: absent             Ortho Exam      Assessment:  66 y.o. male with:  1. Type 2 diabetes mellitus without complication, with long-term current use of insulin ([address]    2. Tinea unguium    3. Peripheral vascular disease, unspecified ([address]    4. Pain in left toe(s)    5. Pain in toe of right foot       Orders Placed This Encounter   Procedures     DIABETES FOOT EXAM           Q7   []Yes    []No                Q8   [x]Yes    []No                     Q9   []Yes    []No    Plan:   Pt was evaluated and examined. Patient was given personalized discharge instructions.   Nails 1-10 were debrided sharply in length and thickness with a nipper and , without incident. Pt will follow up in 3 months or sooner if any problems arise. Diagnosis was discussed with the pt and all of their questions were answered in detail. Proper foot hygiene and care was discussed with the pt. Informed patient on proper diabetic foot care and importance of tight glycemic control. Patient to check feet daily and contact the office with any questions/problems/concerns. Other comorbidity noted and will be managed by PCP.  7/26/2021    Electronically signed by Xander Hinkle DPM on 7/26/2021 at 1:17 PM  7/26/2021     It was discussed in detail with the patient proper hygiene for the diabetic foot. They are to get into a habit of looking at their feet or have someone look at them. If they are unable to daily, they are to look for any signs of redness, blistering, cracking, swelling, drainage, open lesions, etc.  They are to dry in-between the toes after each bath or shower gently. The water should be tested with their elbow to prevent burns. The patient is to refrain from soaking their feet unless instructed by myself to do otherwise. They are to refrain from going barefoot. Shoe gear should be inspected for any foreign objects. Shoes should have a deep, wide toe box area. With any type of shoe, the feet should be inspected for any signs of pressure, i.e., redness, blistering, or open lesions. The patient was instructed to contact myself or other health care provider with any questions.

## 2021-07-29 DIAGNOSIS — D50.9 IRON DEFICIENCY ANEMIA, UNSPECIFIED IRON DEFICIENCY ANEMIA TYPE: ICD-10-CM

## 2021-07-29 DIAGNOSIS — E78.2 MIXED HYPERLIPIDEMIA: ICD-10-CM

## 2021-07-29 DIAGNOSIS — E11.42 TYPE 2 DIABETES MELLITUS WITH DIABETIC POLYNEUROPATHY, WITH LONG-TERM CURRENT USE OF INSULIN (HCC): ICD-10-CM

## 2021-07-29 DIAGNOSIS — Z79.4 TYPE 2 DIABETES MELLITUS WITH DIABETIC POLYNEUROPATHY, WITH LONG-TERM CURRENT USE OF INSULIN (HCC): ICD-10-CM

## 2021-07-29 DIAGNOSIS — E55.9 VITAMIN D DEFICIENCY: ICD-10-CM

## 2021-07-29 LAB
ALBUMIN SERPL-MCNC: 4.6 G/DL (ref 3.5–5.2)
ALP BLD-CCNC: 98 U/L (ref 40–129)
ALT SERPL-CCNC: 20 U/L (ref 0–40)
ANION GAP SERPL CALCULATED.3IONS-SCNC: 21 MMOL/L (ref 7–16)
AST SERPL-CCNC: 24 U/L (ref 0–39)
BASOPHILS ABSOLUTE: 0.08 E9/L (ref 0–0.2)
BASOPHILS RELATIVE PERCENT: 0.9 % (ref 0–2)
BILIRUB SERPL-MCNC: 0.6 MG/DL (ref 0–1.2)
BILIRUBIN URINE: NEGATIVE
BLOOD, URINE: NEGATIVE
BUN BLDV-MCNC: 24 MG/DL (ref 6–23)
CALCIUM SERPL-MCNC: 10 MG/DL (ref 8.6–10.2)
CHLORIDE BLD-SCNC: 98 MMOL/L (ref 98–107)
CHOLESTEROL, TOTAL: 149 MG/DL (ref 0–199)
CLARITY: CLEAR
CO2: 21 MMOL/L (ref 22–29)
COLOR: YELLOW
CREAT SERPL-MCNC: 1.9 MG/DL (ref 0.7–1.2)
CREATININE URINE: 122 MG/DL (ref 40–278)
EOSINOPHILS ABSOLUTE: 0.38 E9/L (ref 0.05–0.5)
EOSINOPHILS RELATIVE PERCENT: 4.2 % (ref 0–6)
FERRITIN: 224 NG/ML
FOLATE: 19 NG/ML (ref 4.8–24.2)
GFR AFRICAN AMERICAN: 42
GFR NON-AFRICAN AMERICAN: 35 ML/MIN/1.73
GLUCOSE BLD-MCNC: 223 MG/DL (ref 74–99)
GLUCOSE URINE: >=1000 MG/DL
HCT VFR BLD CALC: 53.3 % (ref 37–54)
HDLC SERPL-MCNC: 32 MG/DL
HEMOGLOBIN: 17.4 G/DL (ref 12.5–16.5)
IMMATURE GRANULOCYTES #: 0.03 E9/L
IMMATURE GRANULOCYTES %: 0.3 % (ref 0–5)
IRON SATURATION: 29 % (ref 20–55)
IRON: 87 MCG/DL (ref 59–158)
KETONES, URINE: NEGATIVE MG/DL
LDL CHOLESTEROL CALCULATED: 85 MG/DL (ref 0–99)
LEUKOCYTE ESTERASE, URINE: NEGATIVE
LYMPHOCYTES ABSOLUTE: 1.97 E9/L (ref 1.5–4)
LYMPHOCYTES RELATIVE PERCENT: 21.6 % (ref 20–42)
MCH RBC QN AUTO: 30.7 PG (ref 26–35)
MCHC RBC AUTO-ENTMCNC: 32.6 % (ref 32–34.5)
MCV RBC AUTO: 94 FL (ref 80–99.9)
MICROALBUMIN UR-MCNC: 81.5 MG/L
MICROALBUMIN/CREAT UR-RTO: 66.8 (ref 0–30)
MONOCYTES ABSOLUTE: 0.77 E9/L (ref 0.1–0.95)
MONOCYTES RELATIVE PERCENT: 8.4 % (ref 2–12)
NEUTROPHILS ABSOLUTE: 5.9 E9/L (ref 1.8–7.3)
NEUTROPHILS RELATIVE PERCENT: 64.6 % (ref 43–80)
NITRITE, URINE: NEGATIVE
PDW BLD-RTO: 13.7 FL (ref 11.5–15)
PH UA: 5.5 (ref 5–9)
PLATELET # BLD: 236 E9/L (ref 130–450)
PMV BLD AUTO: 11.6 FL (ref 7–12)
POTASSIUM SERPL-SCNC: 4.6 MMOL/L (ref 3.5–5)
PROTEIN UA: NEGATIVE MG/DL
RBC # BLD: 5.67 E12/L (ref 3.8–5.8)
SODIUM BLD-SCNC: 140 MMOL/L (ref 132–146)
SPECIFIC GRAVITY UA: 1.02 (ref 1–1.03)
TOTAL IRON BINDING CAPACITY: 298 MCG/DL (ref 250–450)
TOTAL PROTEIN: 8.5 G/DL (ref 6.4–8.3)
TRIGL SERPL-MCNC: 161 MG/DL (ref 0–149)
TSH SERPL DL<=0.05 MIU/L-ACNC: 2.52 UIU/ML (ref 0.27–4.2)
UROBILINOGEN, URINE: 0.2 E.U./DL
VITAMIN B-12: 407 PG/ML (ref 211–946)
VITAMIN D 25-HYDROXY: 38 NG/ML (ref 30–100)
VLDLC SERPL CALC-MCNC: 32 MG/DL
WBC # BLD: 9.1 E9/L (ref 4.5–11.5)

## 2021-07-30 LAB — HBA1C MFR BLD: 9.1 % (ref 4–5.6)

## 2021-08-11 ENCOUNTER — CARE COORDINATION (OUTPATIENT)
Dept: CARE COORDINATION | Age: 71
End: 2021-08-11

## 2021-08-11 NOTE — CARE COORDINATION
-ACM spoke to patient to offer enrollment into Care Coordination program.  -Introduced self, reason for call, and explanation of program.   -Patient declined at this time.  He said he will eventually do the CC program but now is not a good time.    -Patient encouraged to contact ACM or inform PCP if he should change his mind.  -Pt has ACM name and contact information for when Pt is ready.   -Will remove name from care team.

## 2021-08-25 ENCOUNTER — OFFICE VISIT (OUTPATIENT)
Dept: PRIMARY CARE CLINIC | Age: 71
End: 2021-08-25
Payer: MEDICARE

## 2021-08-25 VITALS
HEART RATE: 77 BPM | BODY MASS INDEX: 31.5 KG/M2 | DIASTOLIC BLOOD PRESSURE: 62 MMHG | HEIGHT: 71 IN | OXYGEN SATURATION: 97 % | TEMPERATURE: 96.3 F | SYSTOLIC BLOOD PRESSURE: 118 MMHG | WEIGHT: 225 LBS

## 2021-08-25 DIAGNOSIS — E11.42 DIABETIC POLYNEUROPATHY ASSOCIATED WITH TYPE 2 DIABETES MELLITUS (HCC): ICD-10-CM

## 2021-08-25 DIAGNOSIS — I25.810 CORONARY ARTERY DISEASE INVOLVING CORONARY BYPASS GRAFT OF NATIVE HEART WITHOUT ANGINA PECTORIS: ICD-10-CM

## 2021-08-25 DIAGNOSIS — Z79.4 TYPE 2 DIABETES MELLITUS WITH DIABETIC POLYNEUROPATHY, WITH LONG-TERM CURRENT USE OF INSULIN (HCC): Primary | ICD-10-CM

## 2021-08-25 DIAGNOSIS — E11.42 TYPE 2 DIABETES MELLITUS WITH DIABETIC POLYNEUROPATHY, WITH LONG-TERM CURRENT USE OF INSULIN (HCC): Primary | ICD-10-CM

## 2021-08-25 DIAGNOSIS — I10 BENIGN ESSENTIAL HYPERTENSION: ICD-10-CM

## 2021-08-25 DIAGNOSIS — H60.543 ECZEMA OF BOTH EXTERNAL EARS: ICD-10-CM

## 2021-08-25 PROCEDURE — 99214 OFFICE O/P EST MOD 30 MIN: CPT | Performed by: FAMILY MEDICINE

## 2021-08-25 RX ORDER — HYDROCHLOROTHIAZIDE 25 MG/1
25 TABLET ORAL DAILY
COMMUNITY
Start: 2021-07-22

## 2021-08-25 RX ORDER — PREGABALIN 100 MG/1
100 CAPSULE ORAL 2 TIMES DAILY
Qty: 180 CAPSULE | Refills: 1 | Status: SHIPPED
Start: 2021-08-25 | End: 2021-11-24

## 2021-08-25 RX ORDER — PREGABALIN 100 MG/1
100 CAPSULE ORAL 2 TIMES DAILY
COMMUNITY
End: 2021-08-25 | Stop reason: SDUPTHER

## 2021-08-25 RX ORDER — DULAGLUTIDE 3 MG/.5ML
INJECTION, SOLUTION SUBCUTANEOUS
COMMUNITY
Start: 2021-08-14 | End: 2022-03-24 | Stop reason: ALTCHOICE

## 2021-08-25 RX ORDER — TRIAMCINOLONE ACETONIDE 5 MG/G
CREAM TOPICAL
Qty: 15 G | Refills: 1 | Status: SHIPPED
Start: 2021-08-25 | End: 2022-03-24 | Stop reason: SDUPTHER

## 2021-08-25 ASSESSMENT — ENCOUNTER SYMPTOMS
COUGH: 0
ABDOMINAL PAIN: 0
ROS SKIN COMMENTS: MASS ON BACK
SHORTNESS OF BREATH: 0
NAUSEA: 0
CONSTIPATION: 0
VOMITING: 0
BACK PAIN: 0
WHEEZING: 0
DIARRHEA: 0

## 2021-08-25 NOTE — PROGRESS NOTES
21  Asiya Alston : 1950 Sex: male  Age: 66 y.o. Chief Complaint   Patient presents with    Diabetes     HPI:  66 y.o. male patient presents today for 2 month(s) follow up of chronic medical conditions, medication refills and FBW results. Patient's chart, medical, surgical and medication history all reviewed. Diabetes Mellitus  66 y.o. male presents for follow up of type 2 diabetes. Current diabetic medications include: insulin injections: Levemir 20/40U and Jardiance and Trulicity. Did not increase insulin as instructed last OV. Previous medications tried include: oral agents (dual therapy): glipizide (Glucotrol), metformin (generic) and insulin injections: Humalog/Novolog SSI, but failed due to renal impairment. Most recent HgA1c was 9.1% (2021)---9.4% (2021)---9.8% (2020)---10.1% (2020)---9.6% (2019). His most recent TSH was 2.520. LDL is 85. Renal function is decreased (GFR= 35). The patient has evidence of end organ damage including nephropathy, peripheral neuropathy and cardiovascular disease. He does see a Podiatrist for foot care- Dr. Paulina Giles. Last eye exam was unknown. He follows with [facility]- last seen in 2021. Hypertension   The patient presents today for follow up of HTN. The problem is well controlled. Did not take medication this AM.  Risk factors for coronary artery disease include Age > 27, male, previous MI, HTN, diabetes and elevated cholesterol. Current treatments include amlodipine (Norvasc), HCTZ and metoprolol (Lopressor, Toprol). The patient is compliant all of the time. Lifestyle changes the patient has made include none. Today the patient is complaining of none.       Hyperlipidemia  The 10-year ASCVD risk score (Clarita Wang, et al., 2013) is: 40%    Values used to calculate the score:      Age: 66 years      Sex: Male      Is Non- : No      Diabetic: Yes      Tobacco smoker: No Systolic Blood Pressure: 232 mmHg      Is BP treated: Yes      HDL Cholesterol: 32 mg/dL      Total Cholesterol: 149 mg/dL    ROS:  Review of Systems   Constitutional: Negative for chills, fatigue and fever. Respiratory: Negative for cough, shortness of breath and wheezing. Cardiovascular: Negative for chest pain and palpitations. Gastrointestinal: Negative for abdominal pain, constipation, diarrhea, nausea and vomiting. Musculoskeletal: Positive for arthralgias. Negative for back pain. Skin: Negative for rash. Mass on back   Neurological: Positive for numbness. Negative for dizziness and headaches. Psychiatric/Behavioral: Negative for dysphoric mood. The patient is not nervous/anxious. All other systems reviewed and are negative. Current Outpatient Medications on File Prior to Visit   Medication Sig Dispense Refill    hydroCHLOROthiazide (HYDRODIURIL) 25 MG tablet Take 25 mg by mouth daily      insulin detemir (LEVEMIR FLEXTOUCH) 100 UNIT/ML injection pen Inject 25 Units into the skin every morning (before breakfast) AND 35 Units nightly.  162 mL 1    pantoprazole (PROTONIX) 40 MG tablet Take 1 tablet by mouth once daily 90 tablet 1    QUEtiapine (SEROQUEL) 25 MG tablet TAKE 1 TABLET BY MOUTH ONCE DAILY AS NEEDED FOR INSOMNIA 90 tablet 1    metoprolol succinate (TOPROL XL) 25 MG extended release tablet Take 1 tablet by mouth once daily 90 tablet 1    Insulin Pen Needle (B-D UF III MINI PEN NEEDLES) 31G X 5 MM MISC USE AS DIRECTED FOR  INSULIN  ADMINISTRATION 300 each 5    atorvastatin (LIPITOR) 40 MG tablet Take 1 tablet by mouth daily 90 tablet 1    Dulaglutide (TRULICITY) 1.5 GQ/7.6XM SOPN Inject 1.5 mg into the skin once a week 15 pen 1    empagliflozin (JARDIANCE) 25 MG tablet Take 25 mg by mouth daily (with breakfast) 90 tablet 1    amLODIPine (NORVASC) 10 MG tablet TAKE 1 TABLET BY MOUTH ONCE DAILY 90 tablet 1    Handicap Placard MISC by Does not apply route Duration: Lifetime  Exp:  06/2026 1 each 0    diclofenac sodium (VOLTAREN) 1 % GEL Apply 4 g topically 4 times daily as needed for Pain 100 g 1    ZINC PO Take by mouth      Continuous Blood Gluc Sensor (FREESTYLE [place] 14 DAY SENSOR) MISC USE AS DIRECTED 6 each 11    Continuous Blood Gluc  (FREESTYLE [place] 14 DAY READER) JOSSE Use as directed to monitor blood glucose 1 Device 1    vitamin C (ASCORBIC ACID) 500 MG tablet Take 500 mg by mouth daily      aspirin 81 MG chewable tablet Take 81 mg by mouth daily      TRULICITY 3 KO/5.9QR SOPN INJECT 3 MG SUBCUTANEOUSLY ONCE A WEEK      [DISCONTINUED] Continuous Blood Gluc Sensor (FREESTYLE [place] 14 DAY SENSOR) MISC Use as directed to monitor blood glucose 6 each 1     No current facility-administered medications on file prior to visit. Allergies   Allergen Reactions    Clindamycin Hcl      C. Diff.       Cymbalta [Duloxetine Hcl]     Lactose Intolerance (Gi)     Metformin        Past Medical History:   Diagnosis Date    CAD (coronary artery disease)     Diabetes mellitus ([facility] [address]     GERD (gastroesophageal reflux disease) 11/18/2016    Hyperlipidemia     Hypertension     Obesity      Past Surgical History:   Procedure Laterality Date    COLONOSCOPY      CORONARY ARTERY BYPASS GRAFT  11/23/2016    x4    ENDOSCOPY, COLON, DIAGNOSTIC      SKIN CANCER EXCISION  08/22/2017    SCC BERNAT- SCC 2018 LEFT EAR/NOSE/LEFT ARM     Family History   Problem Relation Age of Onset    Alzheimer's Disease Mother     Heart Disease Mother     Diabetes Mother     Heart Disease Father     Diabetes Paternal Grandmother      Social History     Socioeconomic History    Marital status:      Spouse name: Not on file    Number of children: Not on file    Years of education: Not on file    Highest education level: Not on file   Occupational History    Not on file   Tobacco Use    Smoking status: Former Smoker     Packs/day: 2.50     Years: 44.00     Pack years: 110.00     Types: Cigarettes     Start date: 10     Quit date:      Years since quittin.6    Smokeless tobacco: Former User     Quit date: 1999   Substance and Sexual Activity    Alcohol use: No    Drug use: No    Sexual activity: Not on file   Other Topics Concern    Not on file   Social History Narrative    Not on file     Social Determinants of Health     Financial Resource Strain:     Difficulty of Paying Living Expenses:    Food Insecurity:     Worried About Running Out of Food in the Last Year:     [address] in the Last Year:    Transportation Needs:     Lack of Transportation (Medical):  Lack of Transportation (Non-Medical):    Physical Activity:     Days of Exercise per Week:     Minutes of Exercise per Session:    Stress:     Feeling of Stress :    Social Connections:     Frequency of Communication with Friends and Family:     Frequency of Social Gatherings with Friends and Family:     Attends [employer] witness Services:     Active Member of Clubs or Organizations:     Attends Club or Organization Meetings:     Marital Status:    Intimate Partner Violence:     Fear of Current or Ex-Partner:     Emotionally Abused:     Physically Abused:     Sexually Abused:        Vitals:    21 0954   BP: 118/62   Pulse: 77   Temp: 96.3 °F (35.7 °C)   SpO2: 97%   Weight: 225 lb (102.1 kg)   Height: 5' 11\" (1.803 m)       Physical Exam:  Physical Exam  Vitals and nursing note reviewed. Constitutional:       General: He is not in acute distress. Appearance: Normal appearance. He is well-developed. He is obese. He is not ill-appearing. HENT:      Head: Normocephalic and atraumatic. Right Ear: Hearing and external ear normal.      Left Ear: Hearing and external ear normal.      Nose:      Comments: Wearing mask  Eyes:      General: Lids are normal. No scleral icterus. Extraocular Movements: Extraocular movements intact.       Conjunctiva/sclera: Conjunctivae normal. Neck:      Thyroid: No thyromegaly. Cardiovascular:      Rate and Rhythm: Normal rate and regular rhythm. Heart sounds: Normal heart sounds. No murmur heard. Pulmonary:      Effort: Pulmonary effort is normal. No respiratory distress. Breath sounds: Normal breath sounds. No wheezing. Musculoskeletal:         General: No tenderness or deformity. Normal range of motion. Cervical back: Normal range of motion and neck supple. Right lower leg: No edema. Left lower leg: No edema. Lymphadenopathy:      Cervical: No cervical adenopathy. Skin:     General: Skin is warm and dry. Findings: No rash. Comments: Lipoma noted to mid thoracic spine   Neurological:      General: No focal deficit present. Mental Status: He is alert and oriented to person, place, and time. Gait: Gait normal.   Psychiatric:         Mood and Affect: Mood and affect normal.         Speech: Speech normal.         Behavior: Behavior normal.         Thought Content:  Thought content normal.         Labs:  CBC with Differential:    Lab Results   Component Value Date    WBC 9.1 07/29/2021    RBC 5.67 07/29/2021    HGB 17.4 07/29/2021    HCT 53.3 07/29/2021     07/29/2021    MCV 94.0 07/29/2021    MCH 30.7 07/29/2021    MCHC 32.6 07/29/2021    RDW 13.7 07/29/2021    SEGSPCT 85.5 07/18/2020    LYMPHOPCT 21.6 07/29/2021    MONOPCT 8.4 07/29/2021    BASOPCT 0.9 07/29/2021    MONOSABS 0.77 07/29/2021    LYMPHSABS 1.97 07/29/2021    EOSABS 0.38 07/29/2021    BASOSABS 0.08 07/29/2021     CMP:    Lab Results   Component Value Date     07/29/2021    K 4.6 07/29/2021    CL 98 07/29/2021    CO2 21 07/29/2021    BUN 24 07/29/2021    CREATININE 1.9 07/29/2021    GFRAA 42 07/29/2021    AGRATIO 1.1 07/18/2020    LABGLOM 35 07/29/2021    GLUCOSE 223 07/29/2021    PROT 8.5 07/29/2021    LABALBU 4.6 07/29/2021    CALCIUM 10.0 07/29/2021    BILITOT 0.6 07/29/2021    ALKPHOS 98 07/29/2021    AST 24 07/29/2021 ALT 20 07/29/2021     Uric Acid:    Lab Results   Component Value Date    LABURIC 4.1 03/17/2020     HgBA1c:    Lab Results   Component Value Date    LABA1C 9.1 07/29/2021     Microalbumen/Creatinine ratio:  No components found for: RUCREAT  FLP:    Lab Results   Component Value Date    TRIG 161 07/29/2021    HDL 32 07/29/2021    LDLCALC 85 07/29/2021    LABVLDL 32 07/29/2021     TSH:    Lab Results   Component Value Date    TSH 2.520 07/29/2021     PSA:   Lab Results   Component Value Date    PSA 0.33 02/25/2021        Assessment and Plan:  Becky Mckeon was seen today for diabetes. Diagnoses and all orders for this visit:    Type 2 diabetes mellitus with diabetic polyneuropathy, with long-term current use of insulin ([address]  Mildly improved. Will continue medications the same- Jardiance was just added- check POCT A1c next visit. If no improvement, will change Trulicity to Ozempic and adjust insulin. Diabetic polyneuropathy associated with type 2 diabetes mellitus (HCC)  -     pregabalin (LYRICA) 100 MG capsule; Take 1 capsule by mouth 2 times daily for 90 days. Stable    Benign essential hypertension  Well controlled. Labs reviewed in detail. Repeat full labs in 6 months. Coronary artery disease involving coronary bypass graft of native heart without angina pectoris  No symptoms. Eczema of both external ears  -     triamcinolone (ARISTOCORT) 0.5 % cream; Apply topically 3 times daily. Return in about 2 months (around 10/25/2021) for AWV.       Seen By:  Maribel Monroy DO

## 2021-10-25 ENCOUNTER — PROCEDURE VISIT (OUTPATIENT)
Dept: PODIATRY | Age: 71
End: 2021-10-25
Payer: MEDICARE

## 2021-10-25 VITALS
BODY MASS INDEX: 31.38 KG/M2 | SYSTOLIC BLOOD PRESSURE: 118 MMHG | DIASTOLIC BLOOD PRESSURE: 64 MMHG | WEIGHT: 225 LBS | TEMPERATURE: 97.3 F

## 2021-10-25 DIAGNOSIS — E11.9 TYPE 2 DIABETES MELLITUS WITHOUT COMPLICATION, WITH LONG-TERM CURRENT USE OF INSULIN (HCC): ICD-10-CM

## 2021-10-25 DIAGNOSIS — B35.1 TINEA UNGUIUM: Primary | ICD-10-CM

## 2021-10-25 DIAGNOSIS — I73.9 PERIPHERAL VASCULAR DISEASE, UNSPECIFIED (HCC): ICD-10-CM

## 2021-10-25 DIAGNOSIS — Z79.4 TYPE 2 DIABETES MELLITUS WITHOUT COMPLICATION, WITH LONG-TERM CURRENT USE OF INSULIN (HCC): ICD-10-CM

## 2021-10-25 DIAGNOSIS — M79.674 PAIN IN TOE OF RIGHT FOOT: ICD-10-CM

## 2021-10-25 DIAGNOSIS — M79.675 PAIN IN LEFT TOE(S): ICD-10-CM

## 2021-10-25 PROCEDURE — 11721 DEBRIDE NAIL 6 OR MORE: CPT | Performed by: PODIATRIST

## 2021-10-25 ASSESSMENT — ENCOUNTER SYMPTOMS
RESPIRATORY NEGATIVE: 1
EYES NEGATIVE: 1
BACK PAIN: 1

## 2021-10-25 NOTE — PROGRESS NOTES
[address] [place] [id number]  Dept: [phone]  Dept Fax: [phone]    DIABETIC NAIL PROGRESS NOTE  Date of patient's visit: 10/25/2021  Patient's Name:  Daljit Hendrix YOB: 1950            Patient Care Team:  Ki Reagan DO as PCP - General (Family Medicine)  Ki Reagan DO as PCP - Lew Jernigan Provider  Delmy Cutler DPM as Consulting Physician (Podiatry)          Chief Complaint   Patient presents with    Diabetes     saw pcp Dr. Ki Reagan 8/25/21    Toe Pain       Subjective: Daljit Hendrix comes to clinic for Diabetes (saw pcp Dr. Ki Reagan 8/25/21) and Toe Pain    he is a diabetic and states that diabetic foot exam .  Pt currently has complaint of thickened, elongated nails that they cannot manage by themselves. Pt's primary care physician is Ki Reagan DO last seen   . 4-1--2020     Lab Results   Component Value Date    LABA1C 9.1 (H) 07/29/2021      Complains of numbness in the feet bilat. Past Medical History:   Diagnosis Date    CAD (coronary artery disease)     Diabetes mellitus ([facility] [address]     GERD (gastroesophageal reflux disease) 11/18/2016    Hyperlipidemia     Hypertension     Obesity        Allergies   Allergen Reactions    Clindamycin Hcl      C. Diff.  Cymbalta [Duloxetine Hcl]     Lactose Intolerance (Gi)     Metformin      Current Outpatient Medications on File Prior to Visit   Medication Sig Dispense Refill    atorvastatin (LIPITOR) 40 MG tablet Take 1 tablet by mouth once daily 90 tablet 1    hydroCHLOROthiazide (HYDRODIURIL) 25 MG tablet Take 25 mg by mouth daily      TRULICITY 3 KM/0.1SN SOPN INJECT 3 MG SUBCUTANEOUSLY ONCE A WEEK      pregabalin (LYRICA) 100 MG capsule Take 1 capsule by mouth 2 times daily for 90 days. 180 capsule 1    triamcinolone (ARISTOCORT) 0.5 % cream Apply topically 3 times daily.  15 g 1    pantoprazole (PROTONIX) 40 MG tablet Take 1 tablet by mouth once daily 90 tablet 1    QUEtiapine (SEROQUEL) 25 MG tablet TAKE 1 TABLET BY MOUTH ONCE DAILY AS NEEDED FOR INSOMNIA 90 tablet 1    metoprolol succinate (TOPROL XL) 25 MG extended release tablet Take 1 tablet by mouth once daily 90 tablet 1    Insulin Pen Needle (B-D UF III MINI PEN NEEDLES) 31G X 5 MM MISC USE AS DIRECTED FOR  INSULIN  ADMINISTRATION 300 each 5    Dulaglutide (TRULICITY) 1.5 WC/8.9BG SOPN Inject 1.5 mg into the skin once a week 15 pen 1    empagliflozin (JARDIANCE) 25 MG tablet Take 25 mg by mouth daily (with breakfast) 90 tablet 1    amLODIPine (NORVASC) 10 MG tablet TAKE 1 TABLET BY MOUTH ONCE DAILY 90 tablet 1    Handicap Placard MISC by Does not apply route Duration:  Lifetime  Exp:  06/2026 1 each 0    ZINC PO Take by mouth      Continuous Blood Gluc Sensor (FREESTYLE [place] 14 DAY SENSOR) MISC USE AS DIRECTED 6 each 11    Continuous Blood Gluc  (FREESTYLE [place]OR[place] 14 DAY READER) JOSSE Use as directed to monitor blood glucose 1 Device 1    vitamin C (ASCORBIC ACID) 500 MG tablet Take 500 mg by mouth daily      aspirin 81 MG chewable tablet Take 81 mg by mouth daily      insulin detemir (LEVEMIR FLEXTOUCH) 100 UNIT/ML injection pen Inject 25 Units into the skin every morning (before breakfast) AND 35 Units nightly. 162 mL 1    diclofenac sodium (VOLTAREN) 1 % GEL Apply 4 g topically 4 times daily as needed for Pain 100 g 1    [DISCONTINUED] Continuous Blood Gluc Sensor (FREESTYLE [place]OR[place] 14 DAY SENSOR) [facility] Use as directed to monitor blood glucose 6 each 1     No current facility-administered medications on file prior to visit. Review of Systems   Constitutional: Negative. HENT: Negative. Eyes: Negative. Respiratory: Negative. Cardiovascular: Negative. Endocrine: Negative. Musculoskeletal: Positive for arthralgias, back pain and gait problem.        Review of Systems:   History obtained from chart review and the patient  General ROS: negative for - chills, fatigue, fever, night sweats or weight gain  Constitutional: Negative for chills, diaphoresis, fatigue, fever and unexpected weight change. Musculoskeletal: Positive for arthralgias, gait problem and joint swelling. Neurological ROS: negative for - behavioral changes, confusion, headaches or seizures. Negative for weakness and numbness. Dermatological ROS: negative for - mole changes, rash  Cardiovascular: Negative for leg swelling. Gastrointestinal: Negative for constipation, diarrhea, nausea and vomiting. Objective:  General: AAO x 3 in NAD. Derm  Toenail Description nails are thick and mycotic yellow incurvated causing pain with shoe gear  Sites of Onychomycosis Involvement (Check affected area)  [x] [x] [x] [x] [x] [x] [x] [x] [x] [x]  5 4 3 2 1 1 2 3 4 5                          Right                                        Left    Thickness  [x] [x] [x] [x] [x] [x] [x] [x] [x] [x]  5 4 3 2 1 1 2 3 4 5                         Right                                        Left    Dystrophic Changes   [x] [x] [x] [x] [x] [x] [x] [x] [x] [x]  5 4 3 2 1 1 2 3 4 5                         Right                                        Left    Color   [x] [x] [x] [x] [x] [x] [x] [x] [x] [x]  5 4 3 2 1 1 2 3 4 5                          Right                                        Left    Incurvation/Ingrowin   [x] [x] [x] [x] [x] [x] [x] [x] [x] [x]  5 4 3 2 1 1 2 3 4 5                         Right                                        Left    Inflammation/Pain   [x] [x] [x] [x] [x] [x] [x] [x] [x] [x]  5 4 3 2 1 1 2 3 4 5                         Right                                        Left      Dermatologic Exam:  Skin lesion/ulceration .    Skin   Loss of hair  lower extremity      Musculoskeletal:     1st MPJ ROM decreased, Bilateral.  Muscle Bilateral.  Pain present upon palpation of toenails 1-5, Bilateral. decreased medial longitudinal arch, Bilateral.  Ankle ROM decreased,Bilateral.    Dorsally contracted digits     Vascular: DP and PT pulses absnet  CFT < seconds, Bilateral.  Hair growth absent to the level of the digits, Bilateral.  Edema  Bilateral.  Varicosities  Bilateral.     Neurological: Sensation diminshed to light touch to level of digits, Bilateral.  Protective sensation  sites via 5.07/10g [place] Monofilament, Bilateral.  negative Tinel's, Bilateral.  negative Valleix sign, Bilateral.      Integument: nails   thickened > 3.0 mm, dystrophic and crumbly, discolored with subungual debris. Toes cool to touch    Visual inspection:  Deformity: hammertoe deformity rachael feet  amputation: absent    Edema:   Sensory exam:  Monofilament sensation: abnormal - 6/10 via SW 5.07/10g monofilament to the plantar foot bilateral feet    Pulses:     Pinprick: Impaired  Proprioception: Impaired  Vibration (128 Hz): Impaired       DM with PVD       [x]Yes    []no    Foot Exam    General  General Appearance: appears stated age and healthy   Orientation: alert and oriented to person, place, and time       Right Foot/Ankle     Inspection and Palpation  Skin Exam: skin changes and abnormal color; (There is loss of hair to lower extremity mild discoloration to the lower extremity coolness to touch to the toes nails are thick and yellow dystrophic)    Neurovascular  Dorsalis pedis: 2+  Posterior tibial: absent      Left Foot/Ankle      Inspection and Palpation  Skin Exam: skin changes; Neurovascular  Dorsalis pedis: 2+  Posterior tibial: absent             Ortho Exam      Assessment:  66 y.o. male with:  1. Tinea unguium    2. Peripheral vascular disease, unspecified ([address]    3. Pain in left toe(s)    4. Pain in toe of right foot    5.  Type 2 diabetes mellitus without complication, with long-term current use of insulin ([address]       Orders Placed This Encounter   Procedures     DIABETES FOOT EXAM           Q7   []Yes    []No                Q8   [x]Yes

## 2021-11-24 ENCOUNTER — OFFICE VISIT (OUTPATIENT)
Dept: PRIMARY CARE CLINIC | Age: 71
End: 2021-11-24
Payer: MEDICARE

## 2021-11-24 VITALS
DIASTOLIC BLOOD PRESSURE: 80 MMHG | HEART RATE: 58 BPM | HEIGHT: 71 IN | OXYGEN SATURATION: 96 % | BODY MASS INDEX: 30.94 KG/M2 | WEIGHT: 221 LBS | SYSTOLIC BLOOD PRESSURE: 118 MMHG | TEMPERATURE: 98.2 F

## 2021-11-24 DIAGNOSIS — E11.42 DIABETIC POLYNEUROPATHY ASSOCIATED WITH TYPE 2 DIABETES MELLITUS (HCC): ICD-10-CM

## 2021-11-24 DIAGNOSIS — E78.2 MIXED HYPERLIPIDEMIA: ICD-10-CM

## 2021-11-24 DIAGNOSIS — E55.9 VITAMIN D DEFICIENCY: ICD-10-CM

## 2021-11-24 DIAGNOSIS — Z79.4 TYPE 2 DIABETES MELLITUS WITH DIABETIC POLYNEUROPATHY, WITH LONG-TERM CURRENT USE OF INSULIN (HCC): Primary | ICD-10-CM

## 2021-11-24 DIAGNOSIS — E11.42 TYPE 2 DIABETES MELLITUS WITH DIABETIC POLYNEUROPATHY, WITH LONG-TERM CURRENT USE OF INSULIN (HCC): Primary | ICD-10-CM

## 2021-11-24 DIAGNOSIS — I10 BENIGN ESSENTIAL HYPERTENSION: ICD-10-CM

## 2021-11-24 PROCEDURE — 99214 OFFICE O/P EST MOD 30 MIN: CPT | Performed by: FAMILY MEDICINE

## 2021-11-24 RX ORDER — INSULIN DETEMIR 100 [IU]/ML
INJECTION, SOLUTION SUBCUTANEOUS 2 TIMES DAILY
COMMUNITY
End: 2022-03-24 | Stop reason: ALTCHOICE

## 2021-11-24 RX ORDER — PREGABALIN 100 MG/1
100 CAPSULE ORAL 2 TIMES DAILY
COMMUNITY
End: 2022-03-08

## 2021-11-24 RX ORDER — PIOGLITAZONEHYDROCHLORIDE 30 MG/1
TABLET ORAL
COMMUNITY
Start: 2021-11-23

## 2021-11-24 ASSESSMENT — ENCOUNTER SYMPTOMS
DIARRHEA: 0
ROS SKIN COMMENTS: MASS ON BACK
WHEEZING: 0
BACK PAIN: 0
NAUSEA: 0
VOMITING: 0
SHORTNESS OF BREATH: 0
ABDOMINAL PAIN: 0
COUGH: 0
CONSTIPATION: 0

## 2021-11-24 NOTE — PROGRESS NOTES
21  Mata Balloon : 1950 Sex: male  Age: 66 y.o. Chief Complaint   Patient presents with    Diabetes     HPI:  66 y.o. male patient presents today for 3 month(s) follow up of chronic medical conditions, medication refills and FBW. Patient's chart, medical, surgical and medication history all reviewed. Diabetes Mellitus  66 y.o. male presents for follow up of type 2 diabetes. Current diabetic medications include: insulin injections: Levemir 30/30U and Jardiance, Actos and Trulicity. Previous medications tried include: oral agents (dual therapy): glipizide (Glucotrol), metformin (generic) and insulin injections: Humalog/Novolog SSI, but failed due to renal impairment. Most recent HgA1c was 9.1% (2021)---9.4% (2021)---9.8% (2020)---10.1% (2020)---9.6% (2019). His most recent TSH was 2.520. LDL is 85. Renal function is decreased (GFR= 35). The patient has evidence of end organ damage including nephropathy, peripheral neuropathy and cardiovascular disease. He does see a Podiatrist for foot care- Dr. Myesha Da Silva. Last eye exam was unknown. He follows with [facility]- he was just seen yesterday and had Actos added. Hypertension   The patient presents today for follow up of HTN. The problem is well controlled. Did not take medication this AM.  Risk factors for coronary artery disease include Age > 27, male, previous MI, HTN, diabetes and elevated cholesterol. Current treatments include amlodipine (Norvasc), HCTZ and metoprolol (Lopressor, Toprol). The patient is compliant all of the time. Lifestyle changes the patient has made include none. Today the patient is complaining of none.         Hyperlipidemia  The 10-year ASCVD risk score (Sally Reese, et al., 2013) is: 40%    Values used to calculate the score:      Age: 66 years      Sex: Male      Is Non- : No      Diabetic: Yes      Tobacco smoker: No      Systolic Blood Pressure: 118 mmHg      Is BP treated: Yes      HDL Cholesterol: 32 mg/dL      Total Cholesterol: 149 mg/dL    ROS:  Review of Systems   Constitutional: Negative for chills, fatigue and fever. Respiratory: Negative for cough, shortness of breath and wheezing. Cardiovascular: Negative for chest pain and palpitations. Gastrointestinal: Negative for abdominal pain, constipation, diarrhea, nausea and vomiting. Musculoskeletal: Positive for arthralgias. Negative for back pain. Skin: Negative for rash. Mass on back   Neurological: Positive for numbness. Negative for dizziness and headaches. Psychiatric/Behavioral: Negative for dysphoric mood. The patient is not nervous/anxious. All other systems reviewed and are negative. Current Outpatient Medications on File Prior to Visit   Medication Sig Dispense Refill    insulin detemir (LEVEMIR FLEXTOUCH) 100 UNIT/ML injection pen Inject into the skin 2 times daily 30 units in the morning 30 units at night      pregabalin (LYRICA) 100 MG capsule Take 100 mg by mouth 2 times daily.  pioglitazone (ACTOS) 30 MG tablet       atorvastatin (LIPITOR) 40 MG tablet Take 1 tablet by mouth once daily 90 tablet 1    hydroCHLOROthiazide (HYDRODIURIL) 25 MG tablet Take 25 mg by mouth daily      TRULICITY 3 AV/0.9ZO SOPN INJECT 3 MG SUBCUTANEOUSLY ONCE A WEEK      triamcinolone (ARISTOCORT) 0.5 % cream Apply topically 3 times daily.  15 g 1    pantoprazole (PROTONIX) 40 MG tablet Take 1 tablet by mouth once daily 90 tablet 1    QUEtiapine (SEROQUEL) 25 MG tablet TAKE 1 TABLET BY MOUTH ONCE DAILY AS NEEDED FOR INSOMNIA 90 tablet 1    metoprolol succinate (TOPROL XL) 25 MG extended release tablet Take 1 tablet by mouth once daily 90 tablet 1    Insulin Pen Needle (B-D UF III MINI PEN NEEDLES) 31G X 5 MM MISC USE AS DIRECTED FOR  INSULIN  ADMINISTRATION 300 each 5    empagliflozin (JARDIANCE) 25 MG tablet Take 25 mg by mouth daily (with breakfast) 90 tablet 1    amLODIPine (NORVASC) 10 MG tablet TAKE 1 TABLET BY MOUTH ONCE DAILY 90 tablet 1    Handicap Placard MISC by Does not apply route Duration:  Lifetime  Exp:  06/2026 1 each 0    ZINC PO Take by mouth      Continuous Blood Gluc Sensor (FREESTYLE [place] 14 DAY SENSOR) MISC USE AS DIRECTED 6 each 11    Continuous Blood Gluc  (FREESTYLE [place] 14 DAY READER) JOSSE Use as directed to monitor blood glucose 1 Device 1    vitamin C (ASCORBIC ACID) 500 MG tablet Take 500 mg by mouth daily      aspirin 81 MG chewable tablet Take 81 mg by mouth daily      insulin detemir (LEVEMIR FLEXTOUCH) 100 UNIT/ML injection pen Inject 25 Units into the skin every morning (before breakfast) AND 35 Units nightly. 162 mL 1    [DISCONTINUED] Continuous Blood Gluc Sensor (FREESTYLE [place] 14 DAY SENSOR) MISC Use as directed to monitor blood glucose 6 each 1     No current facility-administered medications on file prior to visit. Allergies   Allergen Reactions    Clindamycin Hcl      C. Diff.       Cymbalta [Duloxetine Hcl]     Lactose Intolerance (Gi)     Metformin        Past Medical History:   Diagnosis Date    CAD (coronary artery disease)     [facility] [address]     GERD (gastroesophageal reflux disease) 11/18/2016    Hyperlipidemia     Hypertension     Obesity      Past Surgical History:   Procedure Laterality Date    COLONOSCOPY      CORONARY ARTERY BYPASS GRAFT  11/23/2016    x4    ENDOSCOPY, COLON, DIAGNOSTIC      SKIN CANCER EXCISION  08/22/2017    SCC BERNAT- [facility] 2018 LEFT EAR/NOSE/LEFT ARM     Family History   Problem Relation Age of Onset    Alzheimer's Disease Mother     Heart Disease Mother     Diabetes Mother     Heart Disease Father     Diabetes Paternal Grandmother      Social History     Socioeconomic History    Marital status:      Spouse name: Not on file    Number of children: Not on file    Years of education: Not on file    Highest education level: Not on file Occupational History    Not on file   Tobacco Use    Smoking status: Former Smoker     Packs/day: 2.50     Years: 44.00     Pack years: 110.00     Types: Cigarettes     Start date:      Quit date:      Years since quittin.9    Smokeless tobacco: Former User     Quit date: 1999   Substance and Sexual Activity    Alcohol use: No    Drug use: No    Sexual activity: Not on file   Other Topics Concern    Not on file   Social History Narrative    Not on file     Social Determinants of Health     Financial Resource Strain:     Difficulty of Paying Living Expenses: Not on file   Food Insecurity:     Worried About [facility] in the Last Year: Not on file    Jaja of Food in the Last Year: Not on file   Transportation Needs:     Lack of Transportation (Medical): Not on file    Lack of Transportation (Non-Medical):  Not on file   Physical Activity:     Days of Exercise per Week: Not on file    Minutes of Exercise per Session: Not on file   Stress:     Feeling of Stress : Not on file   Social Connections:     Frequency of Communication with Friends and Family: Not on file    Frequency of Social Gatherings with Friends and Family: Not on file    Attends Yarsanism Services: Not on file    Active Member of [address] [place] [address] or Organizations: Not on file    Attends Club or Organization Meetings: Not on file    Marital Status: Not on file   Intimate Partner Violence:     Fear of Current or Ex-Partner: Not on file    Emotionally Abused: Not on file    Physically Abused: Not on file    Sexually Abused: Not on file   Housing Stability:     Unable to Pay for Housing in the Last Year: Not on file    Number of [place] in the Last Year: Not on file    Unstable Housing in the Last Year: Not on file       Vitals:    21 1113   BP: 118/80   Pulse: 58   Temp: 98.2 °F (36.8 °C)   SpO2: 96%   Weight: 221 lb (100.2 kg)   Height: 5' 11\" (1.803 m)       Physical Exam:  Physical Exam  Vitals and nursing note reviewed. Constitutional:       General: He is not in acute distress. Appearance: Normal appearance. He is well-developed. He is obese. He is not ill-appearing. HENT:      Head: Normocephalic and atraumatic. Right Ear: Hearing and external ear normal.      Left Ear: Hearing and external ear normal.      Nose:      Comments: Wearing mask  Eyes:      General: Lids are normal. No scleral icterus. Extraocular Movements: Extraocular movements intact. Conjunctiva/sclera: Conjunctivae normal.   Neck:      Thyroid: No thyromegaly. Cardiovascular:      Rate and Rhythm: Normal rate and regular rhythm. Heart sounds: Normal heart sounds. No murmur heard. Pulmonary:      Effort: Pulmonary effort is normal. No respiratory distress. Breath sounds: Normal breath sounds. No wheezing. Musculoskeletal:         General: No tenderness or deformity. Normal range of motion. Cervical back: Normal range of motion and neck supple. Right lower leg: No edema. Left lower leg: No edema. Lymphadenopathy:      Cervical: No cervical adenopathy. Skin:     General: Skin is warm and dry. Findings: No rash. Comments: Lipoma noted to mid thoracic spine   Neurological:      General: No focal deficit present. Mental Status: He is alert and oriented to person, place, and time. Gait: Gait normal.   Psychiatric:         Mood and Affect: Mood and affect normal.         Speech: Speech normal.         Behavior: Behavior normal.         Thought Content:  Thought content normal.         Labs:  CBC with Differential:    Lab Results   Component Value Date    WBC 9.1 07/29/2021    RBC 5.67 07/29/2021    HGB 17.4 07/29/2021    HCT 53.3 07/29/2021     07/29/2021    MCV 94.0 07/29/2021    MCH 30.7 07/29/2021    MCHC 32.6 07/29/2021    RDW 13.7 07/29/2021    SEGSPCT 85.5 07/18/2020    LYMPHOPCT 21.6 07/29/2021    MONOPCT 8.4 07/29/2021    BASOPCT 0.9 07/29/2021 MONOSABS 0.77 07/29/2021    LYMPHSABS 1.97 07/29/2021    EOSABS 0.38 07/29/2021    BASOSABS 0.08 07/29/2021     CMP:    Lab Results   Component Value Date     07/29/2021    K 4.6 07/29/2021    CL 98 07/29/2021    CO2 21 07/29/2021    BUN 24 07/29/2021    CREATININE 1.9 07/29/2021    GFRAA 42 07/29/2021    AGRATIO 1.1 07/18/2020    LABGLOM 35 07/29/2021    GLUCOSE 223 07/29/2021    PROT 8.5 07/29/2021    LABALBU 4.6 07/29/2021    CALCIUM 10.0 07/29/2021    BILITOT 0.6 07/29/2021    ALKPHOS 98 07/29/2021    AST 24 07/29/2021    ALT 20 07/29/2021     Uric Acid:    Lab Results   Component Value Date    LABURIC 4.1 03/17/2020     HgBA1c:    Lab Results   Component Value Date    LABA1C 9.1 07/29/2021     Microalbumen/Creatinine ratio:  No components found for: RUCREAT  FLP:    Lab Results   Component Value Date    TRIG 161 07/29/2021    HDL 32 07/29/2021    LDLCALC 85 07/29/2021    LABVLDL 32 07/29/2021     TSH:    Lab Results   Component Value Date    TSH 2.520 07/29/2021     PSA:   Lab Results   Component Value Date    PSA 0.33 02/25/2021        Assessment and Plan:  Tom Drew was seen today for diabetes. Diagnoses and all orders for this visit:    Type 2 diabetes mellitus with diabetic polyneuropathy, with long-term current use of insulin ([facility])  -     CBC Auto Differential; Future  -     Comprehensive Metabolic Panel; Future  -     Hemoglobin A1C; Future  -     Lipid Panel; Future  -     TSH without Reflex; Future  -     Vitamin B12 & Folate; Future  -     Urinalysis; Future  -     Microalbumin / Creatinine Urine Ratio; Future  Remains poorly controlled. Patient due for repeat labs. Will have done in December/January. Also planning to see clinical pharmacist for [facility] teaching. Diabetic polyneuropathy associated with type 2 diabetes mellitus ([address]  Discussed options for changing dosing. Will hold off for now.      Benign essential hypertension  Stable    Mixed hyperlipidemia  -     Lipid Panel; Future    Vitamin D deficiency  -     Vitamin D 25 Hydroxy; Future          Return in about 4 months (around 3/24/2022), or if symptoms worsen or fail to improve, for AWV.       Seen By:  Janusz Alaniz, DO

## 2022-01-04 DIAGNOSIS — E55.9 VITAMIN D DEFICIENCY: ICD-10-CM

## 2022-01-04 DIAGNOSIS — E11.42 TYPE 2 DIABETES MELLITUS WITH DIABETIC POLYNEUROPATHY, WITH LONG-TERM CURRENT USE OF INSULIN (HCC): ICD-10-CM

## 2022-01-04 DIAGNOSIS — E78.2 MIXED HYPERLIPIDEMIA: ICD-10-CM

## 2022-01-04 DIAGNOSIS — Z79.4 TYPE 2 DIABETES MELLITUS WITH DIABETIC POLYNEUROPATHY, WITH LONG-TERM CURRENT USE OF INSULIN (HCC): ICD-10-CM

## 2022-01-04 LAB
ALBUMIN SERPL-MCNC: 3.8 G/DL (ref 3.5–5.2)
ALP BLD-CCNC: 87 U/L (ref 40–129)
ALT SERPL-CCNC: 19 U/L (ref 0–40)
ANION GAP SERPL CALCULATED.3IONS-SCNC: 18 MMOL/L (ref 7–16)
AST SERPL-CCNC: 22 U/L (ref 0–39)
BASOPHILS ABSOLUTE: 0.06 E9/L (ref 0–0.2)
BASOPHILS RELATIVE PERCENT: 0.7 % (ref 0–2)
BILIRUB SERPL-MCNC: 0.5 MG/DL (ref 0–1.2)
BILIRUBIN URINE: NEGATIVE
BLOOD, URINE: NEGATIVE
BUN BLDV-MCNC: 21 MG/DL (ref 6–23)
CALCIUM SERPL-MCNC: 9.2 MG/DL (ref 8.6–10.2)
CHLORIDE BLD-SCNC: 99 MMOL/L (ref 98–107)
CHOLESTEROL, TOTAL: 137 MG/DL (ref 0–199)
CLARITY: CLEAR
CO2: 22 MMOL/L (ref 22–29)
COLOR: YELLOW
CREAT SERPL-MCNC: 1.7 MG/DL (ref 0.7–1.2)
CREATININE URINE: 114 MG/DL (ref 40–278)
EOSINOPHILS ABSOLUTE: 0.32 E9/L (ref 0.05–0.5)
EOSINOPHILS RELATIVE PERCENT: 3.7 % (ref 0–6)
FOLATE: >20 NG/ML (ref 4.8–24.2)
GFR AFRICAN AMERICAN: 48
GFR NON-AFRICAN AMERICAN: 40 ML/MIN/1.73
GLUCOSE BLD-MCNC: 217 MG/DL (ref 74–99)
GLUCOSE URINE: >=1000 MG/DL
HBA1C MFR BLD: 9.2 % (ref 4–5.6)
HCT VFR BLD CALC: 50.3 % (ref 37–54)
HDLC SERPL-MCNC: 32 MG/DL
HEMOGLOBIN: 16.8 G/DL (ref 12.5–16.5)
IMMATURE GRANULOCYTES #: 0.04 E9/L
IMMATURE GRANULOCYTES %: 0.5 % (ref 0–5)
KETONES, URINE: NEGATIVE MG/DL
LDL CHOLESTEROL CALCULATED: 66 MG/DL (ref 0–99)
LEUKOCYTE ESTERASE, URINE: NEGATIVE
LYMPHOCYTES ABSOLUTE: 1.52 E9/L (ref 1.5–4)
LYMPHOCYTES RELATIVE PERCENT: 17.7 % (ref 20–42)
MCH RBC QN AUTO: 30.5 PG (ref 26–35)
MCHC RBC AUTO-ENTMCNC: 33.4 % (ref 32–34.5)
MCV RBC AUTO: 91.3 FL (ref 80–99.9)
MICROALBUMIN UR-MCNC: 60.9 MG/L
MICROALBUMIN/CREAT UR-RTO: 53.4 (ref 0–30)
MONOCYTES ABSOLUTE: 0.6 E9/L (ref 0.1–0.95)
MONOCYTES RELATIVE PERCENT: 7 % (ref 2–12)
NEUTROPHILS ABSOLUTE: 6.03 E9/L (ref 1.8–7.3)
NEUTROPHILS RELATIVE PERCENT: 70.4 % (ref 43–80)
NITRITE, URINE: NEGATIVE
PDW BLD-RTO: 13.7 FL (ref 11.5–15)
PH UA: 5.5 (ref 5–9)
PLATELET # BLD: 244 E9/L (ref 130–450)
PMV BLD AUTO: 11.4 FL (ref 7–12)
POTASSIUM SERPL-SCNC: 3.9 MMOL/L (ref 3.5–5)
PROTEIN UA: NEGATIVE MG/DL
RBC # BLD: 5.51 E12/L (ref 3.8–5.8)
SODIUM BLD-SCNC: 139 MMOL/L (ref 132–146)
SPECIFIC GRAVITY UA: 1.02 (ref 1–1.03)
TOTAL PROTEIN: 7.8 G/DL (ref 6.4–8.3)
TRIGL SERPL-MCNC: 196 MG/DL (ref 0–149)
TSH SERPL DL<=0.05 MIU/L-ACNC: 1.42 UIU/ML (ref 0.27–4.2)
UROBILINOGEN, URINE: 0.2 E.U./DL
VITAMIN B-12: 373 PG/ML (ref 211–946)
VITAMIN D 25-HYDROXY: 31 NG/ML (ref 30–100)
VLDLC SERPL CALC-MCNC: 39 MG/DL
WBC # BLD: 8.6 E9/L (ref 4.5–11.5)

## 2022-01-12 ENCOUNTER — OFFICE VISIT (OUTPATIENT)
Dept: FAMILY MEDICINE CLINIC | Age: 72
End: 2022-01-12
Payer: MEDICARE

## 2022-01-12 VITALS
WEIGHT: 225 LBS | DIASTOLIC BLOOD PRESSURE: 74 MMHG | TEMPERATURE: 97.8 F | OXYGEN SATURATION: 98 % | HEART RATE: 73 BPM | BODY MASS INDEX: 31.5 KG/M2 | SYSTOLIC BLOOD PRESSURE: 128 MMHG | RESPIRATION RATE: 18 BRPM | HEIGHT: 71 IN

## 2022-01-12 DIAGNOSIS — Z20.822 CLOSE EXPOSURE TO COVID-19 VIRUS: ICD-10-CM

## 2022-01-12 DIAGNOSIS — Z20.822 CLOSE EXPOSURE TO COVID-19 VIRUS: Primary | ICD-10-CM

## 2022-01-12 PROCEDURE — 99213 OFFICE O/P EST LOW 20 MIN: CPT | Performed by: PHYSICIAN ASSISTANT

## 2022-01-12 RX ORDER — LOSARTAN POTASSIUM 50 MG/1
25 TABLET ORAL DAILY
COMMUNITY
Start: 2022-01-10 | End: 2022-07-28

## 2022-01-12 ASSESSMENT — ENCOUNTER SYMPTOMS
ABDOMINAL PAIN: 0
NAUSEA: 0
VOMITING: 0
COUGH: 0
BACK PAIN: 0
SHORTNESS OF BREATH: 0
PHOTOPHOBIA: 0
DIARRHEA: 0
SORE THROAT: 0

## 2022-01-12 NOTE — PROGRESS NOTES
22  Angy Santillan : 1950 Sex: male  Age 66 y.o. Subjective:  Chief Complaint   Patient presents with   Dimas Cuevas Other         66-year-old male with a history of CAD, peripheral vascular disease, hypertension, type 2 diabetes and GERD presents to the walk-in clinic for a COVID-19 test.  He states that he was exposed to COVID-19 on Saturday. 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 He is vaccinated plus has had his booster. He states that he is currently asymptomatic. Review of Systems   Constitutional: Negative for chills and fever. HENT: Negative for congestion, ear pain and sore throat. Eyes: Negative for photophobia and visual disturbance. Respiratory: Negative for cough and shortness of breath. Cardiovascular: Negative for chest pain. Gastrointestinal: Negative for abdominal pain, diarrhea, nausea and vomiting. Genitourinary: Negative for difficulty urinating, dysuria, frequency and urgency. Musculoskeletal: Negative for back pain, neck pain and neck stiffness. Skin: Negative for rash. Neurological: Negative for dizziness, syncope, weakness, light-headedness and headaches. Hematological: Negative for adenopathy. Does not bruise/bleed easily. Psychiatric/Behavioral: Negative for agitation and confusion. All other systems reviewed and are negative.         PMH:     Past Medical History:   Diagnosis Date    CAD (coronary artery disease)     [facility] mellitus ([facility] [address]     GERD (gastroesophageal reflux disease) 2016    Hyperlipidemia     Hypertension     Obesity     Stage 3b chronic kidney disease ([address]        Past Surgical History:   Procedure Laterality Date    COLONOSCOPY      CORONARY ARTERY BYPASS GRAFT  11/23/2016    x4    ENDOSCOPY, COLON, DIAGNOSTIC      SKIN CANCER EXCISION  2017    SCC BERNAT- SCC 2018 LEFT EAR/NOSE/LEFT ARM       Family History   Problem Relation Age of Onset    Alzheimer's Disease Mother  Heart Disease Mother     Diabetes Mother     Heart Disease Father     Diabetes Paternal Grandmother        Medications:     Current Outpatient Medications:     losartan (COZAAR) 50 MG tablet, Take 25 mg by mouth daily, Disp: , Rfl:     insulin detemir (LEVEMIR FLEXTOUCH) 100 UNIT/ML injection pen, Inject into the skin 2 times daily 30 units in the morning 30 units at night, Disp: , Rfl:     pregabalin (LYRICA) 100 MG capsule, Take 100 mg by mouth 2 times daily. , Disp: , Rfl:     pioglitazone (ACTOS) 30 MG tablet, , Disp: , Rfl:     atorvastatin (LIPITOR) 40 MG tablet, Take 1 tablet by mouth once daily, Disp: 90 tablet, Rfl: 1    hydroCHLOROthiazide (HYDRODIURIL) 25 MG tablet, Take 25 mg by mouth daily, Disp: , Rfl:     TRULICITY 3 OU/6.6QU SOPN, INJECT 3 MG SUBCUTANEOUSLY ONCE A WEEK, Disp: , Rfl:     triamcinolone (ARISTOCORT) 0.5 % cream, Apply topically 3 times daily. , Disp: 15 g, Rfl: 1    pantoprazole (PROTONIX) 40 MG tablet, Take 1 tablet by mouth once daily, Disp: 90 tablet, Rfl: 1    QUEtiapine (SEROQUEL) 25 MG tablet, TAKE 1 TABLET BY MOUTH ONCE DAILY AS NEEDED FOR INSOMNIA, Disp: 90 tablet, Rfl: 1    metoprolol succinate (TOPROL XL) 25 MG extended release tablet, Take 1 tablet by mouth once daily, Disp: 90 tablet, Rfl: 1    Insulin Pen Needle (B-D UF III MINI PEN NEEDLES) 31G X 5 MM MISC, USE AS DIRECTED FOR  INSULIN  ADMINISTRATION, Disp: 300 each, Rfl: 5    empagliflozin (JARDIANCE) 25 MG tablet, Take 25 mg by mouth daily (with breakfast), Disp: 90 tablet, Rfl: 1    amLODIPine (NORVASC) 10 MG tablet, TAKE 1 TABLET BY MOUTH ONCE DAILY, Disp: 90 tablet, Rfl: 1    Handicap Placard [facility], by Does not apply route Duration:  Lifetime Exp:  06/2026, Disp: 1 each, Rfl: 0    ZINC PO, Take by mouth, Disp: , Rfl:     Continuous Blood Gluc Sensor (FREESTYLE [place] 14 DAY SENSOR) MISC, USE AS DIRECTED, Disp: 6 each, Rfl: 11    Continuous Blood Gluc  (FREESTYLE [place] 14 DAY READER) JOSSE, Use as directed to monitor blood glucose, Disp: 1 Device, Rfl: 1    vitamin C (ASCORBIC ACID) 500 MG tablet, Take 500 mg by mouth daily, Disp: , Rfl:     aspirin 81 MG chewable tablet, Take 81 mg by mouth daily, Disp: , Rfl:     insulin detemir (LEVEMIR FLEXTOUCH) 100 UNIT/ML injection pen, Inject 25 Units into the skin every morning (before breakfast) AND 35 Units nightly., Disp: 162 mL, Rfl: 1    Allergies: Allergies   Allergen Reactions    Clindamycin Hcl      C. Diff.  Cymbalta [Duloxetine Hcl]     Lactose Intolerance (Gi)     Metformin        Social History:     Social History     Tobacco Use    Smoking status: Former Smoker     Packs/day: 2.50     Years: 44.00     Pack years: 110.00     Types: Cigarettes     Start date:      Quit date:      Years since quittin.0    Smokeless tobacco: Former User     Quit date: 1999   Substance Use Topics    Alcohol use: No    Drug use: No       Patient lives at home. Physical Exam:     Vitals:    22 1603   BP: 128/74   Pulse: 73   Resp: 18   Temp: 97.8 °F (36.6 °C)   TempSrc: Temporal   SpO2: 98%   Weight: 225 lb (102.1 kg)   Height: 5' 11\" (1.803 m)       Exam:  Physical Exam  Vitals and nursing note reviewed. Constitutional:       General: He is not in acute distress. Appearance: He is well-developed. HENT:      Head: Normocephalic and atraumatic. Right Ear: Tympanic membrane normal.      Left Ear: Tympanic membrane normal.      Nose: Nose normal.      Mouth/Throat:      Mouth: Mucous membranes are moist.      Pharynx: Oropharynx is clear. Eyes:      Conjunctiva/sclera: Conjunctivae normal.      Pupils: Pupils are equal, round, and reactive to light. Cardiovascular:      Rate and Rhythm: Normal rate and regular rhythm. Pulmonary:      Effort: Pulmonary effort is normal. No respiratory distress. Breath sounds: Normal breath sounds.    Abdominal:      General: Bowel sounds are normal.      Palpations: Abdomen is soft. Tenderness: There is no abdominal tenderness. Musculoskeletal:         General: Normal range of motion. Cervical back: Normal range of motion. Skin:     General: Skin is warm and dry. Neurological:      General: No focal deficit present. Mental Status: He is alert and oriented to person, place, and time. Psychiatric:         Mood and Affect: Mood normal.         Behavior: Behavior normal.         Thought Content: Thought content normal.         Judgment: Judgment normal.           Testing:           Medical Decision Making:     Patient upon arrival did not appear toxic or lethargic. Vital signs were reviewed. Past medical history reviewed. Allergies reviewed. Medications reviewed. Patient is presenting after exposure to COVID-19. COVID-19 PCR is pending. Differential diagnosis was discussed. At this point he will quarantine until we have the results of his Covid test back. The patient is to treat his symptoms with over-the-counter analgesics and decongestants. He is to maintain good oral intake. If positive for COVID-19 he is to follow the current CDC guidelines and recommendations for isolation and quarantine. Patient understands plan is agreeable. We discussed signs and symptoms that would warrant emergent evaluation the emergency department. Patient will follow up as needed with his PCP. Clinical Impression:   Bethany Arroyo was seen today for other. Diagnoses and all orders for this visit:    Close exposure to COVID-19 virus  -     COVID-19 Ambulatory; Future        The patient is to call for any concerns or return if any of the signs or symptoms worsen. The patient is to follow-up with PCP in the next 2-3 days for repeat evaluation repeat assessment or go directly to the emergency department. SIGNATURE: Tova Ivey PA-C

## 2022-01-14 LAB
SARS-COV-2: NOT DETECTED
SOURCE: NORMAL

## 2022-01-31 ENCOUNTER — PROCEDURE VISIT (OUTPATIENT)
Dept: PODIATRY | Age: 72
End: 2022-01-31
Payer: MEDICARE

## 2022-01-31 VITALS
SYSTOLIC BLOOD PRESSURE: 124 MMHG | TEMPERATURE: 97.8 F | DIASTOLIC BLOOD PRESSURE: 72 MMHG | WEIGHT: 225 LBS | BODY MASS INDEX: 31.38 KG/M2

## 2022-01-31 DIAGNOSIS — M79.675 PAIN IN LEFT TOE(S): ICD-10-CM

## 2022-01-31 DIAGNOSIS — E11.9 TYPE 2 DIABETES MELLITUS WITHOUT COMPLICATION, WITH LONG-TERM CURRENT USE OF INSULIN (HCC): ICD-10-CM

## 2022-01-31 DIAGNOSIS — B35.1 TINEA UNGUIUM: Primary | ICD-10-CM

## 2022-01-31 DIAGNOSIS — I73.9 PERIPHERAL VASCULAR DISEASE, UNSPECIFIED (HCC): ICD-10-CM

## 2022-01-31 DIAGNOSIS — Z79.4 TYPE 2 DIABETES MELLITUS WITHOUT COMPLICATION, WITH LONG-TERM CURRENT USE OF INSULIN (HCC): ICD-10-CM

## 2022-01-31 DIAGNOSIS — M79.674 PAIN IN TOE OF RIGHT FOOT: ICD-10-CM

## 2022-01-31 PROCEDURE — 11721 DEBRIDE NAIL 6 OR MORE: CPT | Performed by: PODIATRIST

## 2022-01-31 ASSESSMENT — ENCOUNTER SYMPTOMS
RESPIRATORY NEGATIVE: 1
BACK PAIN: 1
EYES NEGATIVE: 1

## 2022-01-31 NOTE — PROGRESS NOTES
[address] [place] [address]  Dept: [phone]  Dept Fax: [phone]    DIABETIC NAIL PROGRESS NOTE  Date of patient's visit: 1/31/2022  Patient's Name:  David Calderon YOB: 1950            Patient Care Team:  Eamon Johnson DO as PCP - General (Family Medicine)  Eamon Johnson DO as PCP - [facility] Empaneled Provider  Elida Ndiaye DPM as Consulting Physician (Podiatry)          Chief Complaint   Patient presents with    Diabetes     SAW PCP DR. JOSE MARROQUIN 1/12/22    Toe Pain       Subjective: David Calderon comes to clinic for Diabetes (SAW PCP DR. JOSE MARROQUIN 1/12/22) and Toe Pain    he is a diabetic and states that diabetic foot exam .  Pt currently has complaint of thickened, elongated nails that they cannot manage by themselves. Pt's primary care physician is Eamon Johnson DO last seen   . 4-1--2020     Lab Results   Component Value Date    LABA1C 9.2 (H) 01/04/2022      Complains of numbness in the feet bilat. Past Medical History:   Diagnosis Date    CAD (coronary artery disease)     [facility] mellitus ([facility] [address]     GERD (gastroesophageal reflux disease) 11/18/2016    Hyperlipidemia     Hypertension     Obesity     Stage 3b chronic kidney disease (HCC)        Allergies   Allergen Reactions    Clindamycin Hcl      C. Diff.  Cymbalta [Duloxetine Hcl]     Lactose Intolerance (Gi)     Metformin      Current Outpatient Medications on File Prior to Visit   Medication Sig Dispense Refill    metoprolol succinate (TOPROL XL) 25 MG extended release tablet Take 1 tablet by mouth once daily 90 tablet 1    losartan (COZAAR) 50 MG tablet Take 25 mg by mouth daily      insulin detemir (LEVEMIR FLEXTOUCH) 100 UNIT/ML injection pen Inject into the skin 2 times daily 30 units in the morning 30 units at night      pregabalin (LYRICA) 100 MG capsule Take 100 mg by mouth 2 times daily.       pioglitazone (ACTOS) 30 MG tablet       atorvastatin (LIPITOR) 40 MG tablet Take 1 tablet by mouth once daily 90 tablet 1    hydroCHLOROthiazide (HYDRODIURIL) 25 MG tablet Take 25 mg by mouth daily      TRULICITY 3 WK/5.5OB SOPN INJECT 3 MG SUBCUTANEOUSLY ONCE A WEEK      triamcinolone (ARISTOCORT) 0.5 % cream Apply topically 3 times daily. 15 g 1    pantoprazole (PROTONIX) 40 MG tablet Take 1 tablet by mouth once daily 90 tablet 1    QUEtiapine (SEROQUEL) 25 MG tablet TAKE 1 TABLET BY MOUTH ONCE DAILY AS NEEDED FOR INSOMNIA 90 tablet 1    Insulin Pen Needle (B-D UF III MINI PEN NEEDLES) 31G X 5 MM MISC USE AS DIRECTED FOR  INSULIN  ADMINISTRATION 300 each 5    empagliflozin (JARDIANCE) 25 MG tablet Take 25 mg by mouth daily (with breakfast) 90 tablet 1    amLODIPine (NORVASC) 10 MG tablet TAKE 1 TABLET BY MOUTH ONCE DAILY 90 tablet 1    Handicap Placard MISC by Does not apply route Duration:  Lifetime  Exp:  06/2026 1 each 0    ZINC PO Take by mouth      Continuous Blood Gluc Sensor (FREESTYLE [place] 14 DAY SENSOR) MISC USE AS DIRECTED 6 each 11    Continuous Blood Gluc  (FREESTYLE [place] 14 DAY READER) JOSSE Use as directed to monitor blood glucose 1 Device 1    vitamin C (ASCORBIC ACID) 500 MG tablet Take 500 mg by mouth daily      aspirin 81 MG chewable tablet Take 81 mg by mouth daily      insulin detemir (LEVEMIR FLEXTOUCH) 100 UNIT/ML injection pen Inject 25 Units into the skin every morning (before breakfast) AND 35 Units nightly. 162 mL 1    [DISCONTINUED] Continuous Blood Gluc Sensor (FREESTYLE [place] 14 DAY SENSOR) [facility] Use as directed to monitor blood glucose 6 each 1     No current facility-administered medications on file prior to visit. Review of Systems   Constitutional: Negative. HENT: Negative. Eyes: Negative. Respiratory: Negative. Cardiovascular: Negative. Endocrine: Negative. Musculoskeletal: Positive for arthralgias, back pain and gait problem. Review of Systems:   History obtained from chart review and the patient  General ROS: negative for - chills, fatigue, fever, night sweats or weight gain  Constitutional: Negative for chills, diaphoresis, fatigue, fever and unexpected weight change. Musculoskeletal: Positive for arthralgias, gait problem and joint swelling. Neurological ROS: negative for - behavioral changes, confusion, headaches or seizures. Negative for weakness and numbness. Dermatological ROS: negative for - mole changes, rash  Cardiovascular: Negative for leg swelling. Gastrointestinal: Negative for constipation, diarrhea, nausea and vomiting. Objective:  General: AAO x 3 in NAD. Derm  Toenail Description nails are thick and mycotic yellow incurvated causing pain with shoe gear  Sites of Onychomycosis Involvement (Check affected area)  [x] [x] [x] [x] [x] [x] [x] [x] [x] [x]  5 4 3 2 1 1 2 3 4 5                          Right                                        Left    Thickness  [x] [x] [x] [x] [x] [x] [x] [x] [x] [x]  5 4 3 2 1 1 2 3 4 5                         Right                                        Left    Dystrophic Changes   [x] [x] [x] [x] [x] [x] [x] [x] [x] [x]  5 4 3 2 1 1 2 3 4 5                         Right                                        Left    Color   [x] [x] [x] [x] [x] [x] [x] [x] [x] [x]  5 4 3 2 1 1 2 3 4 5                          Right                                        Left    Incurvation/Ingrowin   [x] [x] [x] [x] [x] [x] [x] [x] [x] [x]  5 4 3 2 1 1 2 3 4 5                         Right                                        Left    Inflammation/Pain   [x] [x] [x] [x] [x] [x] [x] [x] [x] [x]  5 4 3 2 1 1 2 3 4 5                         Right                                        Left      Dermatologic Exam:  Skin lesion/ulceration .    Skin   Loss of hair  lower extremity      Musculoskeletal:     1st MPJ ROM decreased, Bilateral.  Muscle Bilateral.  Pain present upon palpation of toenails 1-5, Bilateral. decreased medial longitudinal arch, Bilateral.  Ankle ROM decreased,Bilateral.    Dorsally contracted digits     Vascular: DP and PT pulses absnet  CFT < seconds, Bilateral.  Hair growth absent to the level of the digits, Bilateral.  Edema  Bilateral.  Varicosities  Bilateral.     Neurological: Sensation diminshed to light touch to level of digits, Bilateral.  Protective sensation  sites via 5.07/10g [place] Monofilament, Bilateral.  negative Tinel's, Bilateral.  negative Valleix sign, Bilateral.      Integument: nails   thickened > 3.0 mm, dystrophic and crumbly, discolored with subungual debris. Toes cool to touch    Visual inspection:  Deformity: hammertoe deformity rachael feet  amputation: absent    Edema:   Sensory exam:  Monofilament sensation: abnormal - 6/10 via SW 5.07/10g monofilament to the plantar foot bilateral feet    Pulses:     Pinprick: Impaired  Proprioception: Impaired  Vibration (128 Hz): Impaired       DM with PVD       [x]Yes    []no    Foot Exam    General  General Appearance: appears stated age and healthy   Orientation: alert and oriented to person, place, and time       Right Foot/Ankle     Inspection and Palpation  Skin Exam: skin changes and abnormal color; (There is loss of hair to lower extremity mild discoloration to the lower extremity coolness to touch to the toes nails are thick and yellow dystrophic)    Neurovascular  Dorsalis pedis: 2+  Posterior tibial: absent      Left Foot/Ankle      Inspection and Palpation  Skin Exam: skin changes; Neurovascular  Dorsalis pedis: 2+  Posterior tibial: absent             Ortho Exam      Assessment:  66 y.o. male with:  1. Tinea unguium    2. Type 2 diabetes mellitus without complication, with long-term current use of insulin ([address]    3. Peripheral vascular disease, unspecified ([address]    4. Pain in left toe(s)    5.  Pain in toe of right foot       Orders Placed This Encounter   Procedures    HM DIABETES FOOT EXAM           Q7   []Yes    []No                Q8   [x]Yes    []No                     Q9   []Yes    []No    Plan:   Pt was evaluated and examined. Patient was given personalized discharge instructions. Nails 1-10 were debrided sharply in length and thickness with a nipper and , without incident. Pt will follow up in 3 months or sooner if any problems arise. Diagnosis was discussed with the pt and all of their questions were answered in detail. Proper foot hygiene and care was discussed with the pt. Informed patient on proper diabetic foot care and importance of tight glycemic control. Patient to check feet daily and contact the office with any questions/problems/concerns. Other comorbidity noted and will be managed by PCP.  1/31/2022    Electronically signed by Nora Mayfield DPM on 1/31/2022 at 1:35 PM  1/31/2022     It was discussed in detail with the patient proper hygiene for the diabetic foot. They are to get into a habit of looking at their feet or have someone look at them. If they are unable to daily, they are to look for any signs of redness, blistering, cracking, swelling, drainage, open lesions, etc.  They are to dry in-between the toes after each bath or shower gently. The water should be tested with their elbow to prevent burns. The patient is to refrain from soaking their feet unless instructed by myself to do otherwise. They are to refrain from going barefoot. Shoe gear should be inspected for any foreign objects. Shoes should have a deep, wide toe box area. With any type of shoe, the feet should be inspected for any signs of pressure, i.e., redness, blistering, or open lesions. The patient was instructed to contact myself or other health care provider with any questions.

## 2022-03-08 DIAGNOSIS — Z79.4 TYPE 2 DIABETES MELLITUS WITH DIABETIC POLYNEUROPATHY, WITH LONG-TERM CURRENT USE OF INSULIN (HCC): ICD-10-CM

## 2022-03-08 DIAGNOSIS — E11.42 TYPE 2 DIABETES MELLITUS WITH DIABETIC POLYNEUROPATHY, WITH LONG-TERM CURRENT USE OF INSULIN (HCC): ICD-10-CM

## 2022-03-08 RX ORDER — PREGABALIN 100 MG/1
CAPSULE ORAL
Qty: 180 CAPSULE | Refills: 1 | Status: SHIPPED
Start: 2022-03-08 | End: 2022-03-24 | Stop reason: SDUPTHER

## 2022-03-08 RX ORDER — EMPAGLIFLOZIN 25 MG/1
TABLET, FILM COATED ORAL
Qty: 90 TABLET | Refills: 1 | Status: SHIPPED
Start: 2022-03-08 | End: 2022-10-26

## 2022-03-08 NOTE — TELEPHONE ENCOUNTER
Last Appointment:  11/24/2021  Future Appointments   Date Time Provider Christiana Lombardo   3/24/2022 11:00 AM JoseluisayDO ELZBIETA [facility]   4/25/2022  1:00 PM Teresa Cunningham DPM [facility]

## 2022-03-24 ENCOUNTER — OFFICE VISIT (OUTPATIENT)
Dept: PRIMARY CARE CLINIC | Age: 72
End: 2022-03-24
Payer: MEDICARE

## 2022-03-24 VITALS
WEIGHT: 231 LBS | SYSTOLIC BLOOD PRESSURE: 124 MMHG | HEART RATE: 74 BPM | HEIGHT: 71 IN | BODY MASS INDEX: 32.34 KG/M2 | DIASTOLIC BLOOD PRESSURE: 72 MMHG | OXYGEN SATURATION: 96 % | TEMPERATURE: 97.4 F

## 2022-03-24 DIAGNOSIS — Z79.4 TYPE 2 DIABETES MELLITUS WITH DIABETIC POLYNEUROPATHY, WITH LONG-TERM CURRENT USE OF INSULIN (HCC): ICD-10-CM

## 2022-03-24 DIAGNOSIS — E11.42 TYPE 2 DIABETES MELLITUS WITH DIABETIC POLYNEUROPATHY, WITH LONG-TERM CURRENT USE OF INSULIN (HCC): ICD-10-CM

## 2022-03-24 DIAGNOSIS — H60.543 ECZEMA OF BOTH EXTERNAL EARS: ICD-10-CM

## 2022-03-24 DIAGNOSIS — E78.2 MIXED HYPERLIPIDEMIA: ICD-10-CM

## 2022-03-24 DIAGNOSIS — Z12.5 SCREENING FOR PROSTATE CANCER: ICD-10-CM

## 2022-03-24 DIAGNOSIS — Z00.00 MEDICARE ANNUAL WELLNESS VISIT, SUBSEQUENT: Primary | ICD-10-CM

## 2022-03-24 DIAGNOSIS — K21.9 GASTROESOPHAGEAL REFLUX DISEASE WITHOUT ESOPHAGITIS: ICD-10-CM

## 2022-03-24 DIAGNOSIS — N18.32 STAGE 3B CHRONIC KIDNEY DISEASE (HCC): ICD-10-CM

## 2022-03-24 DIAGNOSIS — I10 BENIGN ESSENTIAL HYPERTENSION: ICD-10-CM

## 2022-03-24 DIAGNOSIS — F34.1 DYSTHYMIA: ICD-10-CM

## 2022-03-24 PROCEDURE — 3046F HEMOGLOBIN A1C LEVEL >9.0%: CPT | Performed by: FAMILY MEDICINE

## 2022-03-24 PROCEDURE — G0439 PPPS, SUBSEQ VISIT: HCPCS | Performed by: FAMILY MEDICINE

## 2022-03-24 RX ORDER — ATORVASTATIN CALCIUM 40 MG/1
TABLET, FILM COATED ORAL
Qty: 90 TABLET | Refills: 1 | Status: SHIPPED
Start: 2022-03-24 | End: 2022-10-03

## 2022-03-24 RX ORDER — INSULIN GLARGINE 300 U/ML
INJECTION, SOLUTION SUBCUTANEOUS
COMMUNITY
Start: 2022-01-07

## 2022-03-24 RX ORDER — PEN NEEDLE, DIABETIC 31 GX5/16"
NEEDLE, DISPOSABLE MISCELLANEOUS
Qty: 300 EACH | Refills: 5 | Status: SHIPPED | OUTPATIENT
Start: 2022-03-24

## 2022-03-24 RX ORDER — DULAGLUTIDE 1.5 MG/.5ML
1.5 INJECTION, SOLUTION SUBCUTANEOUS WEEKLY
Qty: 15 PEN | Refills: 1 | Status: CANCELLED | OUTPATIENT
Start: 2022-03-24

## 2022-03-24 RX ORDER — SODIUM ZIRCONIUM CYCLOSILICATE 10 G/10G
POWDER, FOR SUSPENSION ORAL
COMMUNITY
Start: 2022-02-15

## 2022-03-24 RX ORDER — AMLODIPINE BESYLATE 10 MG/1
TABLET ORAL
Qty: 90 TABLET | Refills: 1 | Status: SHIPPED | OUTPATIENT
Start: 2022-03-24

## 2022-03-24 RX ORDER — PANTOPRAZOLE SODIUM 40 MG/1
TABLET, DELAYED RELEASE ORAL
Qty: 90 TABLET | Refills: 1 | Status: SHIPPED | OUTPATIENT
Start: 2022-03-24

## 2022-03-24 RX ORDER — QUETIAPINE FUMARATE 25 MG/1
TABLET, FILM COATED ORAL
Qty: 90 TABLET | Refills: 1 | Status: SHIPPED | OUTPATIENT
Start: 2022-03-24

## 2022-03-24 RX ORDER — TRIAMCINOLONE ACETONIDE 5 MG/G
CREAM TOPICAL
Qty: 15 G | Refills: 1 | Status: SHIPPED | OUTPATIENT
Start: 2022-03-24

## 2022-03-24 RX ORDER — INSULIN DETEMIR 100 [IU]/ML
INJECTION, SOLUTION SUBCUTANEOUS
Qty: 162 ML | Refills: 1 | Status: CANCELLED | OUTPATIENT
Start: 2022-03-24 | End: 2022-06-22

## 2022-03-24 ASSESSMENT — PATIENT HEALTH QUESTIONNAIRE - PHQ9
2. FEELING DOWN, DEPRESSED OR HOPELESS: 0
SUM OF ALL RESPONSES TO PHQ QUESTIONS 1-9: 0
SUM OF ALL RESPONSES TO PHQ QUESTIONS 1-9: 0
SUM OF ALL RESPONSES TO PHQ9 QUESTIONS 1 & 2: 0
1. LITTLE INTEREST OR PLEASURE IN DOING THINGS: 0
SUM OF ALL RESPONSES TO PHQ QUESTIONS 1-9: 0
SUM OF ALL RESPONSES TO PHQ QUESTIONS 1-9: 0

## 2022-03-24 ASSESSMENT — LIFESTYLE VARIABLES: HOW OFTEN DO YOU HAVE A DRINK CONTAINING ALCOHOL: NEVER

## 2022-03-24 NOTE — PROGRESS NOTES
Medicare Annual Wellness Visit    Lucia Reddy is here for Medicare [address] was seen today for medicare awv. Diagnoses and all orders for this visit:    Medicare annual wellness visit, subsequent  HRA reviewed and addressed. UTD on HM. Due for fasting labs. Type 2 diabetes mellitus with diabetic polyneuropathy, with long-term current use of insulin (HCC)  -     Insulin Pen Needle (B-D UF III MINI PEN NEEDLES) 31G X 5 MM MISC; USE AS DIRECTED FOR  INSULIN  ADMINISTRATION  -     Dulaglutide 4.5 MG/0.5ML SOPN; Inject 4.5 mg into the skin once a week  -     CBC with Auto Differential; Future  -     Comprehensive Metabolic Panel; Future  -     Hemoglobin A1C; Future  -     Lipid Panel; Future  -     TSH; Future  -     Vitamin B12 & Folate; Future  -     Urinalysis; Future  -     Microalbumin / Creatinine Urine Ratio; Future  -     pregabalin (LYRICA) 150 MG capsule; Take 1 capsule by mouth 2 times daily for 90 days. Poorly controlled. Follows with pharmacist at [facility] to help with medication adjustments and getting better coverage of medication. INsulin dosing adjusted by them. Benign essential hypertension  -     amLODIPine (NORVASC) 10 MG tablet; TAKE 1 TABLET BY MOUTH ONCE DAILY  Well controlled    Stage 3b chronic kidney disease (HCC)  -     Vitamin D 25 Hydroxy; Future    Mixed hyperlipidemia  -     atorvastatin (LIPITOR) 40 MG tablet; Take 1 tablet by mouth once daily    Eczema of both external ears  -     triamcinolone (ARISTOCORT) 0.5 % cream; Apply topically 3 times daily. Gastroesophageal reflux disease without esophagitis  -     pantoprazole (PROTONIX) 40 MG tablet; Take 1 tablet by mouth once daily    Dysthymia  -     QUEtiapine (SEROQUEL) 25 MG tablet; TAKE 1 TABLET BY MOUTH ONCE DAILY AS NEEDED FOR INSOMNIA    Screening for prostate cancer  -     PSA Screening;  Future          Recommendations for Preventive Services Due: see orders and patient instructions/AVS.  Recommended screening schedule for the next 5-10 years is provided to the patient in written form: see Patient Instructions/AVS.     Return in about 4 months (around 7/24/2022) for Chronic medical conditions. Subjective   The following acute and/or chronic problems were also addressed today:    Diabetes- review of changes from [facility]. Medications reconciled. Due for medication refills. Patient's complete Health Risk Assessment and screening values have been reviewed and are found in Flowsheets. The following problems were reviewed today and where indicated follow up appointments were made and/or referrals ordered.     Positive Risk Factor Screenings with Interventions:    Fall Risk:  Do you feel unsteady or are you worried about falling? : (!) yes  2 or more falls in past year?: no  Fall with injury in past year?: no     Fall Risk Interventions:    · Patient declines any further evaluation/treatment for this issue              General Health and ACP:  General  In general, how would you say your health is?: (!) Poor  In the past 7 days, have you experienced any of the following: New or Increased Pain, New or Increased Fatigue, Loneliness, Social Isolation, Stress or Anger?: No  Do you get the social and emotional support that you need?: Yes  Do you have a Living Will?: (!) No    Advance Directives     Power of  Living Will ACP-Advance Directive ACP-Power of     Not on File Not on File Not on File Not on File      General Health Risk Interventions:  · Poor self-assessment of health status: working on getting diabetes better under control to help with improving health outlook  · No Living Will: Patient declines ACP discussion/assistance    Health Habits/Nutrition:     Physical Activity: Insufficiently Active    Days of Exercise per Week: 1 day    Minutes of Exercise per Session: 20 min     Have you lost any weight without trying in the past 3 months?: No  Body mass index: (!) 32.21  Have you seen the dentist within the past year?: Yes    Health Habits/Nutrition Interventions:  · Inadequate physical activity:  patient is not ready to increase his/her physical activity level at this time    Hearing/Vision:  Do you or your family notice any trouble with your hearing that hasn't been managed with hearing aids?: (!) Yes  Do you have difficulty driving, watching TV, or doing any of your daily activities because of your eyesight?: No  Have you had an eye exam within the past year?: Yes  No exam data present    Hearing/Vision Interventions:  · Hearing concerns:  patient declines any further evaluation/treatment for hearing issues    Safety:  Do you have working smoke detectors?: Yes  Do you have any tripping hazards - loose or unsecured carpets or rugs?: No  Do you have any tripping hazards - clutter in doorways, halls, or stairs?: (!) Yes  Do you have either shower bars, grab bars, non-slip mats or non-slip surfaces in your shower or bathtub?: Yes  Do all of your stairways have a railing or banister?: Yes  Do you always fasten your seatbelt when you are in a car?: Yes    Safety Interventions:  · Home safety tips provided           Objective   Vitals:    03/24/22 1059   BP: 124/72   Pulse: 74   Temp: 97.4 °F (36.3 °C)   SpO2: 96%   Weight: 231 lb (104.8 kg)   Height: 5' 11\" (1.803 m)      Body mass index is 32.22 kg/m². Physical Exam  Vitals and nursing note reviewed. Constitutional:       General: He is not in acute distress. Appearance: Normal appearance. He is well-developed. He is obese. He is not ill-appearing. HENT:      Head: Normocephalic and atraumatic. Right Ear: Hearing and external ear normal.      Left Ear: Hearing and external ear normal.      Nose:      Comments: Wearing mask  Eyes:      General: Lids are normal. No scleral icterus. Extraocular Movements: Extraocular movements intact.       Conjunctiva/sclera: Conjunctivae normal.   Neck:      Thyroid: No thyromegaly. Vascular: No carotid bruit. Cardiovascular:      Rate and Rhythm: Normal rate and regular rhythm. Heart sounds: Normal heart sounds. No murmur heard. Pulmonary:      Effort: Pulmonary effort is normal. No respiratory distress. Breath sounds: Normal breath sounds. No wheezing. Musculoskeletal:         General: No tenderness or deformity. Normal range of motion. Cervical back: Normal range of motion and neck supple. Right lower leg: No edema. Left lower leg: No edema. Lymphadenopathy:      Cervical: No cervical adenopathy. Skin:     General: Skin is warm and dry. Findings: No rash. Comments: Lipoma noted to mid thoracic spine   Neurological:      General: No focal deficit present. Mental Status: He is alert and oriented to person, place, and time. Gait: Gait normal.   Psychiatric:         Mood and Affect: Mood and affect normal.         Speech: Speech normal.         Behavior: Behavior normal.         Thought Content: Thought content normal.              Allergies   Allergen Reactions    Clindamycin Hcl      C. Diff.  Cymbalta [Duloxetine Hcl]     Lactose Intolerance (Gi)     Metformin      Prior to Visit Medications    Medication Sig Taking? Authorizing Provider   pregabalin (LYRICA) 150 MG capsule Take 1 capsule by mouth 2 times daily for 90 days. Yes Ashleigh Shearer DO   TOUJEO SOLOSTAR 300 UNIT/ML SOPN INJECT 60 UNITS SUBCUTANEOUSLY ONCE DAILY Yes Historical Provider, MD MACHADO 10 g PACK oral suspension MIX 1 PACKET IN 45 ML OF WATER & DRINK THREE TIMES DAILY FOR 2 DAYS AND THEN MIX 1 PACKET AND DRINK ONCE DAILY FOR 24 DAYS (STIR AND DRINK IMMEDIATLEY) Yes Historical Provider, MD   atorvastatin (LIPITOR) 40 MG tablet Take 1 tablet by mouth once daily Yes Ashleigh Shearer DO   triamcinolone (ARISTOCORT) 0.5 % cream Apply topically 3 times daily.  Yes Ashleigh Shearer DO   pantoprazole (PROTONIX) 40 MG tablet Take 1 tablet by mouth once daily Yes Ashleigh Shearer DO   QUEtiapine (SEROQUEL) 25 MG tablet TAKE 1 TABLET BY MOUTH ONCE DAILY AS NEEDED FOR INSOMNIA Yes Ashleigh Shearer DO   Insulin Pen Needle (B-D UF III MINI PEN NEEDLES) 31G X 5 MM MISC USE AS DIRECTED FOR  INSULIN  ADMINISTRATION Yes Ashleigh Shearer DO   amLODIPine (NORVASC) 10 MG tablet TAKE 1 TABLET BY MOUTH ONCE DAILY Yes Ashleigh Shearer DO   Dulaglutide 4.5 MG/0.5ML SOPN Inject 4.5 mg into the skin once a week Yes Ashleigh Shearer DO   JARDIANCE 25 MG tablet Take 1 tablet by mouth once daily with breakfast Yes Ashleigh Shearer DO   metoprolol succinate (TOPROL XL) 25 MG extended release tablet Take 1 tablet by mouth once daily Yes Ashleigh Shearer DO   losartan (COZAAR) 50 MG tablet Take 25 mg by mouth daily Yes Historical Provider, MD   pioglitazone (ACTOS) 30 MG tablet  Yes Historical Provider, MD   hydroCHLOROthiazide (HYDRODIURIL) 25 MG tablet Take 25 mg by mouth daily Yes Historical Provider, MD   ZINC PO Take by mouth Yes Historical Provider, MD   Continuous Blood Gluc  (FREESTYLE [place] 14 DAY READER) JOSSE Use as directed to monitor blood glucose Yes Ashleigh Shearer DO   vitamin C (ASCORBIC ACID) 500 MG tablet Take 500 mg by mouth daily Yes Historical Provider, MD   aspirin 81 MG chewable tablet Take 81 mg by mouth daily Yes Historical Provider, MD   Continuous Blood Gluc Sensor (FREESTYLE [place] 14 DAY SENSOR) MISC Use as directed to monitor blood glucose  Ashleigh Shearer DO       CareTeam (Including outside providers/suppliers regularly involved in providing care):   Patient Care Team:  Nayeli Martinez DO as PCP - General (Family Medicine)  Nayeli Martinez DO as PCP - [facility] Empaneled Provider  Chastity Rawls DPM as Consulting Physician (Podiatry)    Reviewed and updated this visit:  Tobacco  Allergies  Meds  Problems  Med Hx  Surg Hx  Soc Hx  Fam Hx

## 2022-03-27 RX ORDER — PREGABALIN 150 MG/1
150 CAPSULE ORAL 2 TIMES DAILY
Qty: 180 CAPSULE | Refills: 1 | Status: SHIPPED
Start: 2022-03-27 | End: 2022-10-26

## 2022-04-25 ENCOUNTER — PROCEDURE VISIT (OUTPATIENT)
Dept: PODIATRY | Age: 72
End: 2022-04-25
Payer: MEDICARE

## 2022-04-25 VITALS
WEIGHT: 231 LBS | TEMPERATURE: 98 F | DIASTOLIC BLOOD PRESSURE: 68 MMHG | SYSTOLIC BLOOD PRESSURE: 138 MMHG | BODY MASS INDEX: 32.22 KG/M2

## 2022-04-25 DIAGNOSIS — E11.9 TYPE 2 DIABETES MELLITUS WITHOUT COMPLICATION, WITH LONG-TERM CURRENT USE OF INSULIN (HCC): ICD-10-CM

## 2022-04-25 DIAGNOSIS — B35.1 TINEA UNGUIUM: Primary | ICD-10-CM

## 2022-04-25 DIAGNOSIS — M79.675 PAIN IN LEFT TOE(S): ICD-10-CM

## 2022-04-25 DIAGNOSIS — I73.9 PERIPHERAL VASCULAR DISEASE, UNSPECIFIED (HCC): ICD-10-CM

## 2022-04-25 DIAGNOSIS — Z79.4 TYPE 2 DIABETES MELLITUS WITHOUT COMPLICATION, WITH LONG-TERM CURRENT USE OF INSULIN (HCC): ICD-10-CM

## 2022-04-25 DIAGNOSIS — M79.674 PAIN IN TOE OF RIGHT FOOT: ICD-10-CM

## 2022-04-25 PROCEDURE — 11721 DEBRIDE NAIL 6 OR MORE: CPT | Performed by: PODIATRIST

## 2022-04-25 ASSESSMENT — ENCOUNTER SYMPTOMS
EYES NEGATIVE: 1
RESPIRATORY NEGATIVE: 1
BACK PAIN: 1

## 2022-04-25 NOTE — PROGRESS NOTES
[address] [place] [id number]  Dept: [phone]  Dept Fax: [phone]    DIABETIC NAIL PROGRESS NOTE  Date of patient's visit: 4/25/2022  Patient's Name:  Patito oMss YOB: 1950            Patient Care Team:  Don Palomo DO as PCP - General (Family Medicine)  Don Palomo DO as PCP - [facility]  Tae Adams DPM as Consulting Physician (Podiatry)          Chief Complaint   Patient presents with    Diabetes     saw pcp Dr. Don Palomo 4/21/22    Toe Pain       Subjective: Patito Moss comes to clinic for Diabetes (saw pcp Dr. Don Palomo 4/21/22) and Toe Pain    he is a diabetic and states that diabetic foot exam .  Pt currently has complaint of thickened, elongated nails that they cannot manage by themselves. Pt's primary care physician is Don Palomo DO last seen   . 4-1--2020     Lab Results   Component Value Date    LABA1C 9.2 (H) 01/04/2022      Complains of numbness in the feet bilat. Past Medical History:   Diagnosis Date    CAD (coronary artery disease)     Diabetes mellitus ([address]     GERD (gastroesophageal reflux disease) 11/18/2016    Hyperlipidemia     Hypertension     Obesity     Stage 3b chronic kidney disease (HCC)        Allergies   Allergen Reactions    Clindamycin Hcl      C. Diff.  Cymbalta [Duloxetine Hcl]     Lactose Intolerance (Gi)     Metformin      Current Outpatient Medications on File Prior to Visit   Medication Sig Dispense Refill    pregabalin (LYRICA) 150 MG capsule Take 1 capsule by mouth 2 times daily for 90 days.  180 capsule 1    TOUJEO SOLOSTAR 300 UNIT/ML SOPN INJECT 60 UNITS SUBCUTANEOUSLY ONCE DAILY      LOKELMA 10 g PACK oral suspension MIX 1 PACKET IN 45 ML OF WATER & DRINK THREE TIMES DAILY FOR 2 DAYS AND THEN MIX 1 PACKET AND DRINK ONCE DAILY FOR 24 DAYS (STIR AND DRINK IMMEDIATLEY)      atorvastatin (LIPITOR) 40 MG tablet Take 1 tablet by mouth once daily 90 tablet 1    triamcinolone (ARISTOCORT) 0.5 % cream Apply topically 3 times daily. 15 g 1    pantoprazole (PROTONIX) 40 MG tablet Take 1 tablet by mouth once daily 90 tablet 1    QUEtiapine (SEROQUEL) 25 MG tablet TAKE 1 TABLET BY MOUTH ONCE DAILY AS NEEDED FOR INSOMNIA 90 tablet 1    Insulin Pen Needle (B-D UF III MINI PEN NEEDLES) 31G X 5 MM MISC USE AS DIRECTED FOR  INSULIN  ADMINISTRATION 300 each 5    amLODIPine (NORVASC) 10 MG tablet TAKE 1 TABLET BY MOUTH ONCE DAILY 90 tablet 1    Dulaglutide 4.5 MG/0.5ML SOPN Inject 4.5 mg into the skin once a week 6 mL 3    JARDIANCE 25 MG tablet Take 1 tablet by mouth once daily with breakfast 90 tablet 1    metoprolol succinate (TOPROL XL) 25 MG extended release tablet Take 1 tablet by mouth once daily 90 tablet 1    pioglitazone (ACTOS) 30 MG tablet       hydroCHLOROthiazide (HYDRODIURIL) 25 MG tablet Take 25 mg by mouth daily      ZINC PO Take by mouth      Continuous Blood Gluc  (FREESTYLE [place] 14 DAY READER) JOSSE Use as directed to monitor blood glucose 1 Device 1    vitamin C (ASCORBIC ACID) 500 MG tablet Take 500 mg by mouth daily      aspirin 81 MG chewable tablet Take 81 mg by mouth daily      losartan (COZAAR) 50 MG tablet Take 25 mg by mouth daily      [DISCONTINUED] Continuous Blood Gluc Sensor (FREESTYLE [place] 14 DAY SENSOR) [facility] Use as directed to monitor blood glucose 6 each 1     No current facility-administered medications on file prior to visit. Review of Systems   Constitutional: Negative. HENT: Negative. Eyes: Negative. Respiratory: Negative. Cardiovascular: Negative. Endocrine: Negative. Musculoskeletal: Positive for arthralgias, back pain and gait problem.        Review of Systems:   History obtained from chart review and the patient  General ROS: negative for - chills, fatigue, fever, night sweats or weight gain  Constitutional: Negative for chills, diaphoresis, fatigue, fever and unexpected weight change. Musculoskeletal: Positive for arthralgias, gait problem and joint swelling. Neurological ROS: negative for - behavioral changes, confusion, headaches or seizures. Negative for weakness and numbness. Dermatological ROS: negative for - mole changes, rash  Cardiovascular: Negative for leg swelling. Gastrointestinal: Negative for constipation, diarrhea, nausea and vomiting. Objective:  General: AAO x 3 in NAD. Derm  Toenail Description nails are thick and mycotic yellow incurvated causing pain with shoe gear  Sites of Onychomycosis Involvement (Check affected area)  [x] [x] [x] [x] [x] [x] [x] [x] [x] [x]  5 4 3 2 1 1 2 3 4 5                          Right                                        Left    Thickness  [x] [x] [x] [x] [x] [x] [x] [x] [x] [x]  5 4 3 2 1 1 2 3 4 5                         Right                                        Left    Dystrophic Changes   [x] [x] [x] [x] [x] [x] [x] [x] [x] [x]  5 4 3 2 1 1 2 3 4 5                         Right                                        Left    Color   [x] [x] [x] [x] [x] [x] [x] [x] [x] [x]  5 4 3 2 1 1 2 3 4 5                          Right                                        Left    Incurvation/Ingrowin   [x] [x] [x] [x] [x] [x] [x] [x] [x] [x]  5 4 3 2 1 1 2 3 4 5                         Right                                        Left    Inflammation/Pain   [x] [x] [x] [x] [x] [x] [x] [x] [x] [x]  5 4 3 2 1 1 2 3 4 5                         Right                                        Left      Dermatologic Exam:  Skin lesion/ulceration .    Skin   Loss of hair  lower extremity      Musculoskeletal:     1st MPJ ROM decreased, Bilateral.  Muscle Bilateral.  Pain present upon palpation of toenails 1-5, Bilateral. decreased medial longitudinal arch, Bilateral.  Ankle ROM decreased,Bilateral.    Dorsally contracted digits     Vascular: DP and PT pulses absnet  CFT < seconds, Bilateral.  Hair growth absent to the level of the digits, Bilateral.  Edema  Bilateral.  Varicosities  Bilateral.     Neurological: Sensation diminshed to light touch to level of digits, Bilateral.  Protective sensation  sites via 5.07/10g [place] Monofilament, Bilateral.  negative Tinel's, Bilateral.  negative Valleix sign, Bilateral.      Integument: nails   thickened > 3.0 mm, dystrophic and crumbly, discolored with subungual debris. Toes cool to touch    Visual inspection:  Deformity: hammertoe deformity rachael feet  amputation: absent    Edema:   Sensory exam:  Monofilament sensation: abnormal - 6/10 via SW 5.07/10g monofilament to the plantar foot bilateral feet    Pulses:     Pinprick: Impaired  Proprioception: Impaired  Vibration (128 Hz): Impaired       DM with PVD       [x]Yes    []no    Foot Exam    General  General Appearance: appears stated age and healthy   Orientation: alert and oriented to person, place, and time       Right Foot/Ankle     Inspection and Palpation  Skin Exam: skin changes and abnormal color; (There is loss of hair to lower extremity mild discoloration to the lower extremity coolness to touch to the toes nails are thick and yellow dystrophic)    Neurovascular  Dorsalis pedis: 2+  Posterior tibial: absent      Left Foot/Ankle      Inspection and Palpation  Skin Exam: skin changes; Neurovascular  Dorsalis pedis: 2+  Posterior tibial: absent             Ortho Exam      Assessment:  74 y.o. male with:  1. Tinea unguium    2. Type 2 diabetes mellitus without complication, with long-term current use of insulin ([address]    3. Peripheral vascular disease, unspecified ([address]    4. Pain in left toe(s)    5. Pain in toe of right foot       Orders Placed This Encounter   Procedures     DIABETES FOOT EXAM           Q7   []Yes    []No                Q8   [x]Yes    []No                     Q9   []Yes    []No    Plan:   Pt was evaluated and examined.  Patient was given personalized discharge instructions. Nails 1-10 were debrided sharply in length and thickness with a nipper and , without incident. Pt will follow up in 3 months or sooner if any problems arise. Diagnosis was discussed with the pt and all of their questions were answered in detail. Proper foot hygiene and care was discussed with the pt. Informed patient on proper diabetic foot care and importance of tight glycemic control. Patient to check feet daily and contact the office with any questions/problems/concerns. Other comorbidity noted and will be managed by PCP.  4/25/2022    Electronically signed by Love Prince DPM on 4/25/2022 at 1:41 PM  4/25/2022     It was discussed in detail with the patient proper hygiene for the diabetic foot. They are to get into a habit of looking at their feet or have someone look at them. If they are unable to daily, they are to look for any signs of redness, blistering, cracking, swelling, drainage, open lesions, etc.  They are to dry in-between the toes after each bath or shower gently. The water should be tested with their elbow to prevent burns. The patient is to refrain from soaking their feet unless instructed by myself to do otherwise. They are to refrain from going barefoot. Shoe gear should be inspected for any foreign objects. Shoes should have a deep, wide toe box area. With any type of shoe, the feet should be inspected for any signs of pressure, i.e., redness, blistering, or open lesions. The patient was instructed to contact myself or other health care provider with any questions.

## 2022-05-11 ENCOUNTER — TELEPHONE (OUTPATIENT)
Dept: PHARMACY | Facility: CLINIC | Age: 72
End: 2022-05-11

## 2022-05-11 NOTE — TELEPHONE ENCOUNTER
Patient identified as being eligible for Medication Therapy Management (MTM) services: comprehensive medication review (CMR). A comprehensive medication review can help the patient get the most benefit from his/her medications. Called patient to schedule a time to speak with a pharmacist over the telephone. No answer left VM: Please contact us at  [phone] to schedule this appointment.  Pharmacists are available Monday thru Friday 7:30 AM till 5:30 PM.

## 2022-05-11 NOTE — LETTER
[place]  [address], [address]        [place]   [address] [facility] [address]           05/27/22     Dear Jovon Quiles are eligible for a complete medication therapy review performed by a [address] [place] [address] [address] licensed clinical pharmacist. This review helps ensure that you are getting the most benefit from the medications you receive and includes the following:  - Review of your medications, including over-the-counter and herbal medications. - Answering questions about your medications and how to get the most benefit from them. - Identifying and helping to prevent potential drug interactions or side effects.  - Possibly identifying less costly alternatives that are equally effective. Under this program, [facility] will work with you and your doctor to manage your drug therapy. Please contact the [address] [place] [address] office to set up a time for your medication review with one of our clinical pharmacists. To contact us call [phone] and select Option 2 . This will be a phone consult and therefore will not require a trip to the medical office. Please note: This is an optional program.  It is a free service provided to help ensure that your medicines are safe, necessary, and effective. Your participation is encouraged, but not required.     Sincerely,  [address] free: [phone]

## 2022-05-27 NOTE — TELEPHONE ENCOUNTER
2nd Attempt Documentation:  2nd attempt to contact this patient regarding the previous message  CLINICAL PHARMACY: [facility]  Patient unavailable at the time of call. Left following message on home TAD: please call back at toll-free [phone] to retrieve previous message. Letter mailed to patient.     For Pharmacy Admin Tracking Only     CPA in place:  No   Gap Closed?: No    Time Spent (min): 15

## 2022-07-25 ENCOUNTER — PROCEDURE VISIT (OUTPATIENT)
Dept: PODIATRY | Age: 72
End: 2022-07-25
Payer: MEDICARE

## 2022-07-25 VITALS
TEMPERATURE: 97.4 F | BODY MASS INDEX: 32.22 KG/M2 | WEIGHT: 231 LBS | SYSTOLIC BLOOD PRESSURE: 123 MMHG | DIASTOLIC BLOOD PRESSURE: 74 MMHG

## 2022-07-25 DIAGNOSIS — I73.9 PERIPHERAL VASCULAR DISEASE, UNSPECIFIED (HCC): ICD-10-CM

## 2022-07-25 DIAGNOSIS — B35.1 TINEA UNGUIUM: Primary | ICD-10-CM

## 2022-07-25 DIAGNOSIS — M79.675 PAIN IN LEFT TOE(S): ICD-10-CM

## 2022-07-25 DIAGNOSIS — E11.9 TYPE 2 DIABETES MELLITUS WITHOUT COMPLICATION, WITH LONG-TERM CURRENT USE OF INSULIN (HCC): ICD-10-CM

## 2022-07-25 DIAGNOSIS — Z79.4 TYPE 2 DIABETES MELLITUS WITHOUT COMPLICATION, WITH LONG-TERM CURRENT USE OF INSULIN (HCC): ICD-10-CM

## 2022-07-25 DIAGNOSIS — M79.674 PAIN IN TOE OF RIGHT FOOT: ICD-10-CM

## 2022-07-25 PROCEDURE — 11721 DEBRIDE NAIL 6 OR MORE: CPT | Performed by: PODIATRIST

## 2022-07-25 ASSESSMENT — ENCOUNTER SYMPTOMS
BACK PAIN: 1
RESPIRATORY NEGATIVE: 1
EYES NEGATIVE: 1

## 2022-07-25 NOTE — PROGRESS NOTES
[address] [place] [address] [id number]  Dept: [phone]  Dept Fax: [phone]    DIABETIC NAIL PROGRESS NOTE  Date of patient's visit: 7/25/2022  Patient's Name:  Emam Hackett YOB: 1950            Patient Care Team:  Lalit Turner DO as PCP - General (Family Medicine)  Lalit Turner DO as PCP - [facility]  Luz Muir DPM as Consulting Physician (Podiatry)          Chief Complaint   Patient presents with    Diabetes    Toe Pain     Saw pcp Dr. Lalit Turner 7/11/2022       Subjective: Emma aHckett comes to clinic for Diabetes and Toe Pain (Saw pcp Dr. Lalit Turner 7/11/2022)    he is a diabetic and states that diabetic foot exam .  Pt currently has complaint of thickened, elongated nails that they cannot manage by themselves. Pt's primary care physician is Lalit Turner DO last seen   . 4-1--2020     Lab Results   Component Value Date    LABA1C 9.2 (H) 01/04/2022      Complains of numbness in the feet bilat. Past Medical History:   Diagnosis Date    CAD (coronary artery disease)     Diabetes mellitus ([facility] [address]     GERD (gastroesophageal reflux disease) 11/18/2016    Hyperlipidemia     Hypertension     Obesity     Stage 3b chronic kidney disease (HCC)        Allergies   Allergen Reactions    Clindamycin Hcl      C. Diff.       Cymbalta [Duloxetine Hcl]     Lactose Intolerance (Gi)     Metformin      Current Outpatient Medications on File Prior to Visit   Medication Sig Dispense Refill    TOUJEO SOLOSTAR 300 UNIT/ML SOPN INJECT 60 UNITS SUBCUTANEOUSLY ONCE DAILY      LOKELMA 10 g PACK oral suspension MIX 1 PACKET IN 45 ML OF WATER & DRINK THREE TIMES DAILY FOR 2 DAYS AND THEN MIX 1 PACKET AND DRINK ONCE DAILY FOR 24 DAYS (STIR AND DRINK IMMEDIATLEY)      atorvastatin (LIPITOR) 40 MG tablet Take 1 tablet by mouth once daily 90 tablet 1    triamcinolone (ARISTOCORT) 0.5 % cream Apply topically 3 times daily. 15 g 1    pantoprazole (PROTONIX) 40 MG tablet Take 1 tablet by mouth once daily 90 tablet 1    QUEtiapine (SEROQUEL) 25 MG tablet TAKE 1 TABLET BY MOUTH ONCE DAILY AS NEEDED FOR INSOMNIA 90 tablet 1    Insulin Pen Needle (B-D UF III MINI PEN NEEDLES) 31G X 5 MM MISC USE AS DIRECTED FOR  INSULIN  ADMINISTRATION 300 each 5    amLODIPine (NORVASC) 10 MG tablet TAKE 1 TABLET BY MOUTH ONCE DAILY 90 tablet 1    JARDIANCE 25 MG tablet Take 1 tablet by mouth once daily with breakfast 90 tablet 1    metoprolol succinate (TOPROL XL) 25 MG extended release tablet Take 1 tablet by mouth once daily 90 tablet 1    pioglitazone (ACTOS) 30 MG tablet       hydroCHLOROthiazide (HYDRODIURIL) 25 MG tablet Take 25 mg by mouth daily      ZINC PO Take by mouth      Continuous Blood Gluc  (FREESTYLE [place] 14 DAY READER) JOSSE Use as directed to monitor blood glucose 1 Device 1    vitamin C (ASCORBIC ACID) 500 MG tablet Take 500 mg by mouth daily      aspirin 81 MG chewable tablet Take 81 mg by mouth daily      pregabalin (LYRICA) 150 MG capsule Take 1 capsule by mouth 2 times daily for 90 days. 180 capsule 1    losartan (COZAAR) 50 MG tablet Take 25 mg by mouth daily      [DISCONTINUED] Continuous Blood Gluc Sensor (FREESTYLE [place] 14 DAY SENSOR) [facility] Use as directed to monitor blood glucose 6 each 1     No current facility-administered medications on file prior to visit. Review of Systems   Constitutional: Negative. HENT: Negative. Eyes: Negative. Respiratory: Negative. Cardiovascular: Negative. Endocrine: Negative. Musculoskeletal:  Positive for arthralgias, back pain and gait problem. Review of Systems:   History obtained from chart review and the patient  General ROS: negative for - chills, fatigue, fever, night sweats or weight gain  Constitutional: Negative for chills, diaphoresis, fatigue, fever and unexpected weight change.   Musculoskeletal: Positive for arthralgias, gait problem and joint swelling. Neurological ROS: negative for - behavioral changes, confusion, headaches or seizures. Negative for weakness and numbness. Dermatological ROS: negative for - mole changes, rash  Cardiovascular: Negative for leg swelling. Gastrointestinal: Negative for constipation, diarrhea, nausea and vomiting. Objective:  General: AAO x 3 in NAD. Derm  Toenail Description nails are thick and mycotic yellow incurvated causing pain with shoe gear  Sites of Onychomycosis Involvement (Check affected area)  [x] [x] [x] [x] [x] [x] [x] [x] [x] [x]  5 4 3 2 1 1 2 3 4 5                          Right                                        Left    Thickness  [x] [x] [x] [x] [x] [x] [x] [x] [x] [x]  5 4 3 2 1 1 2 3 4 5                         Right                                        Left    Dystrophic Changes   [x] [x] [x] [x] [x] [x] [x] [x] [x] [x]  5 4 3 2 1 1 2 3 4 5                         Right                                        Left    Color   [x] [x] [x] [x] [x] [x] [x] [x] [x] [x]  5 4 3 2 1 1 2 3 4 5                          Right                                        Left    Incurvation/Ingrowin   [x] [x] [x] [x] [x] [x] [x] [x] [x] [x]  5 4 3 2 1 1 2 3 4 5                         Right                                        Left    Inflammation/Pain   [x] [x] [x] [x] [x] [x] [x] [x] [x] [x]  5 4 3 2 1 1 2 3 4 5                         Right                                        Left      Dermatologic Exam:  Skin lesion/ulceration .    Skin   Loss of hair  lower extremity      Musculoskeletal:     1st MPJ ROM decreased, Bilateral.  Muscle Bilateral.  Pain present upon palpation of toenails 1-5, Bilateral. decreased medial longitudinal arch, Bilateral.  Ankle ROM decreased,Bilateral.    Dorsally contracted digits     Vascular: DP and PT pulses absnet  CFT < seconds, Bilateral.  Hair growth absent to the level of the digits, Bilateral.  Edema Bilateral.  Varicosities  Bilateral.     Neurological: Sensation diminshed to light touch to level of digits, Bilateral.  Protective sensation  sites via 5.07/10g [place] Monofilament, Bilateral.  negative Tinel's, Bilateral.  negative Valleix sign, Bilateral.      Integument: nails   thickened > 3.0 mm, dystrophic and crumbly, discolored with subungual debris. Toes cool to touch    Visual inspection:  Deformity: hammertoe deformity rachael feet  amputation: absent    Edema:   Sensory exam:  Monofilament sensation: abnormal - 6/10 via SW 5.07/10g monofilament to the plantar foot bilateral feet    Pulses:     Pinprick: Impaired  Proprioception: Impaired  Vibration (128 Hz): Impaired       DM with PVD       [x]Yes    []no    Foot Exam    General  General Appearance: appears stated age and healthy   Orientation: alert and oriented to person, place, and time       Right Foot/Ankle     Inspection and Palpation  Skin Exam: skin changes and abnormal color; (There is loss of hair to lower extremity mild discoloration to the lower extremity coolness to touch to the toes nails are thick and yellow dystrophic)    Neurovascular  Dorsalis pedis: 2+  Posterior tibial: absent      Left Foot/Ankle      Inspection and Palpation  Skin Exam: skin changes; Neurovascular  Dorsalis pedis: 2+  Posterior tibial: absent         Ortho Exam      Assessment:  74 y.o. male with:  1. Tinea unguium    2. Type 2 diabetes mellitus without complication, with long-term current use of insulin (HCC)    3. Pain in left toe(s)    4. Peripheral vascular disease, unspecified ([facility] [address]    5. Pain in toe of right foot       Orders Placed This Encounter   Procedures     DIABETES FOOT EXAM           Q7   []Yes    []No                Q8   [x]Yes    []No                     Q9   []Yes    []No    Plan:   Pt was evaluated and examined. Patient was given personalized discharge instructions.   Nails 1-10 were debrided sharply in length and thickness with a nipper and , without incident. Pt will follow up in 3 months or sooner if any problems arise. Diagnosis was discussed with the pt and all of their questions were answered in detail. Proper foot hygiene and care was discussed with the pt. Informed patient on proper diabetic foot care and importance of tight glycemic control. Patient to check feet daily and contact the office with any questions/problems/concerns. Other comorbidity noted and will be managed by PCP.  7/25/2022    Electronically signed by Alexander Guerrero DPM on 7/25/2022 at 1:05 PM  7/25/2022     It was discussed in detail with the patient proper hygiene for the diabetic foot. They are to get into a habit of looking at their feet or have someone look at them. If they are unable to daily, they are to look for any signs of redness, blistering, cracking, swelling, drainage, open lesions, etc.  They are to dry in-between the toes after each bath or shower gently. The water should be tested with their elbow to prevent burns. The patient is to refrain from soaking their feet unless instructed by myself to do otherwise. They are to refrain from going barefoot. Shoe gear should be inspected for any foreign objects. Shoes should have a deep, wide toe box area. With any type of shoe, the feet should be inspected for any signs of pressure, i.e., redness, blistering, or open lesions. The patient was instructed to contact myself or other health care provider with any questions.

## 2022-07-28 ENCOUNTER — OFFICE VISIT (OUTPATIENT)
Dept: PRIMARY CARE CLINIC | Age: 72
End: 2022-07-28
Payer: MEDICARE

## 2022-07-28 VITALS
BODY MASS INDEX: 33.46 KG/M2 | HEIGHT: 71 IN | HEART RATE: 64 BPM | WEIGHT: 239 LBS | SYSTOLIC BLOOD PRESSURE: 106 MMHG | TEMPERATURE: 97.4 F | DIASTOLIC BLOOD PRESSURE: 68 MMHG | OXYGEN SATURATION: 98 %

## 2022-07-28 DIAGNOSIS — Z79.4 TYPE 2 DIABETES MELLITUS WITH DIABETIC POLYNEUROPATHY, WITH LONG-TERM CURRENT USE OF INSULIN (HCC): Primary | ICD-10-CM

## 2022-07-28 DIAGNOSIS — E11.42 TYPE 2 DIABETES MELLITUS WITH DIABETIC POLYNEUROPATHY, WITH LONG-TERM CURRENT USE OF INSULIN (HCC): Primary | ICD-10-CM

## 2022-07-28 DIAGNOSIS — I10 BENIGN ESSENTIAL HYPERTENSION: ICD-10-CM

## 2022-07-28 DIAGNOSIS — N25.81 SECONDARY HYPERPARATHYROIDISM OF RENAL ORIGIN (HCC): ICD-10-CM

## 2022-07-28 DIAGNOSIS — N18.31 STAGE 3A CHRONIC KIDNEY DISEASE (HCC): ICD-10-CM

## 2022-07-28 DIAGNOSIS — E11.42 DIABETIC POLYNEUROPATHY ASSOCIATED WITH TYPE 2 DIABETES MELLITUS (HCC): ICD-10-CM

## 2022-07-28 DIAGNOSIS — E78.2 MIXED HYPERLIPIDEMIA: ICD-10-CM

## 2022-07-28 DIAGNOSIS — I25.10 CORONARY ARTERY DISEASE INVOLVING NATIVE CORONARY ARTERY OF NATIVE HEART WITHOUT ANGINA PECTORIS: ICD-10-CM

## 2022-07-28 PROBLEM — I25.810 CAD (CORONARY ARTERY DISEASE) OF ARTERY BYPASS GRAFT: Status: RESOLVED | Noted: 2020-07-10 | Resolved: 2022-07-28

## 2022-07-28 PROCEDURE — 3046F HEMOGLOBIN A1C LEVEL >9.0%: CPT | Performed by: FAMILY MEDICINE

## 2022-07-28 PROCEDURE — 1123F ACP DISCUSS/DSCN MKR DOCD: CPT | Performed by: FAMILY MEDICINE

## 2022-07-28 PROCEDURE — 99214 OFFICE O/P EST MOD 30 MIN: CPT | Performed by: FAMILY MEDICINE

## 2022-07-28 ASSESSMENT — ENCOUNTER SYMPTOMS
WHEEZING: 0
ROS SKIN COMMENTS: MASS ON BACK
BACK PAIN: 0
ABDOMINAL PAIN: 0
DIARRHEA: 0
NAUSEA: 0
VOMITING: 0
CONSTIPATION: 0
SHORTNESS OF BREATH: 0
COUGH: 0

## 2022-07-28 NOTE — PROGRESS NOTES
22  Roger Lott : 1950 Sex: male  Age: 74 y.o. Chief Complaint   Patient presents with    Diabetes     HPI:  74 y.o. male patient presents today for 3 month(s) follow up of chronic medical conditions, medication refills and FBW. Patient's chart, medical, surgical and medication history all reviewed. Diabetes Mellitus  74 y.o. male presents for follow up of type 2 diabetes. Current diabetic medications include: insulin injections: Levemir 30/30U and Jardiance , Actos and Trulicity. Previous medications tried include: oral agents (dual therapy): glipizide (Glucotrol), metformin (generic) and insulin injections: Humalog/Novolog SSI , but failed due to renal impairment. Most recent HgA1c was 6.6% (2022)---9.2% (2022)---9.1% (2021)---9.4% (2021)---9.8% (2020)---10.1% (2020)---9.6% (2019). His most recent TSH was 1.420. LDL is 66. Renal function is decreased (GFR= 53), but improved. The patient has evidence of end organ damage including nephropathy, peripheral neuropathy, and cardiovascular disease. He does see a Podiatrist for foot care- Dr. Eleuterio Rodríguez. Last eye exam was unknown. He follows with [facility] and Nephro. Hypertension   The patient presents today for follow up of HTN. The problem is well controlled. Did not take medication this AM.  Risk factors for coronary artery disease include Age > 27, male, previous MI, HTN, diabetes and elevated cholesterol. Current treatments include amlodipine (Norvasc), HCTZ and metoprolol (Lopressor, Toprol). The patient is compliant all of the time. Lifestyle changes the patient has made include  none . Today the patient is complaining of none.         Hyperlipidemia  The 10-year ASCVD risk score (Aundra Denver., et al., 2013) is: 35.5%    Values used to calculate the score:      Age: 74 years      Sex: Male      Is Non- : No      Diabetic: Yes      Tobacco smoker: No Systolic Blood Pressure: 605 mmHg      Is BP treated: Yes      HDL Cholesterol: 32 mg/dL      Total Cholesterol: 137 mg/dL    ROS:  Review of Systems   Constitutional:  Negative for chills, fatigue and fever. Respiratory:  Negative for cough, shortness of breath and wheezing. Cardiovascular:  Negative for chest pain and palpitations. Gastrointestinal:  Negative for abdominal pain, constipation, diarrhea, nausea and vomiting. Musculoskeletal:  Positive for arthralgias. Negative for back pain. Skin:  Negative for rash. Mass on back   Neurological:  Positive for numbness. Negative for dizziness and headaches. Psychiatric/Behavioral:  Negative for dysphoric mood. The patient is not nervous/anxious. All other systems reviewed and are negative. Current Outpatient Medications on File Prior to Visit   Medication Sig Dispense Refill    pregabalin (LYRICA) 150 MG capsule Take 1 capsule by mouth 2 times daily for 90 days. 180 capsule 1    TOUJEO SOLOSTAR 300 UNIT/ML SOPN INJECT 60 UNITS SUBCUTANEOUSLY ONCE DAILY      LOKELMA 10 g PACK oral suspension MIX 1 PACKET IN 45 ML OF WATER & DRINK THREE TIMES DAILY FOR 2 DAYS AND THEN MIX 1 PACKET AND DRINK ONCE DAILY FOR 24 DAYS (STIR AND DRINK IMMEDIATLEY)      atorvastatin (LIPITOR) 40 MG tablet Take 1 tablet by mouth once daily 90 tablet 1    triamcinolone (ARISTOCORT) 0.5 % cream Apply topically 3 times daily.  15 g 1    pantoprazole (PROTONIX) 40 MG tablet Take 1 tablet by mouth once daily 90 tablet 1    QUEtiapine (SEROQUEL) 25 MG tablet TAKE 1 TABLET BY MOUTH ONCE DAILY AS NEEDED FOR INSOMNIA 90 tablet 1    Insulin Pen Needle (B-D UF III MINI PEN NEEDLES) 31G X 5 MM MISC USE AS DIRECTED FOR  INSULIN  ADMINISTRATION 300 each 5    amLODIPine (NORVASC) 10 MG tablet TAKE 1 TABLET BY MOUTH ONCE DAILY 90 tablet 1    JARDIANCE 25 MG tablet Take 1 tablet by mouth once daily with breakfast 90 tablet 1    metoprolol succinate (TOPROL XL) 25 MG extended release tablet Take 1 tablet by mouth once daily 90 tablet 1    losartan (COZAAR) 50 MG tablet Take 25 mg by mouth daily      pioglitazone (ACTOS) 30 MG tablet       hydroCHLOROthiazide (HYDRODIURIL) 25 MG tablet Take 25 mg by mouth daily      ZINC PO Take by mouth      Continuous Blood Gluc  (FREESTYLE [place] 14 DAY READER) JOSSE Use as directed to monitor blood glucose 1 Device 1    vitamin C (ASCORBIC ACID) 500 MG tablet Take 500 mg by mouth daily      aspirin 81 MG chewable tablet Take 81 mg by mouth daily      [DISCONTINUED] Continuous Blood Gluc Sensor (FREESTYLE [place] 14 DAY SENSOR) MISC Use as directed to monitor blood glucose 6 each 1     No current facility-administered medications on file prior to visit. Allergies   Allergen Reactions    Clindamycin Hcl      C. Diff.       Cymbalta [Duloxetine Hcl]     Lactose Intolerance (Gi)     Metformin        Past Medical History:   Diagnosis Date    CAD (coronary artery disease)     Diabetes mellitus ([facility] [address]     GERD (gastroesophageal reflux disease) 11/18/2016    Hyperlipidemia     Hypertension     Obesity     Stage 3b chronic kidney disease ([facility] [address]      Past Surgical History:   Procedure Laterality Date    COLONOSCOPY      CORONARY ARTERY BYPASS GRAFT  11/23/2016    x4    ENDOSCOPY, COLON, DIAGNOSTIC      SKIN CANCER EXCISION  08/22/2017    SCC BERNAT- SCC 2018 LEFT EAR/NOSE/LEFT ARM     Family History   Problem Relation Age of Onset    Alzheimer's Disease Mother     Heart Disease Mother     Diabetes Mother     Heart Disease Father     Diabetes Paternal Grandmother      Social History     Socioeconomic History    Marital status:      Spouse name: Not on file    Number of children: Not on file    Years of education: Not on file    Highest education level: Not on file   Occupational History    Not on file   Tobacco Use    Smoking status: Former     Packs/day: 2.50     Years: 44.00     Pack years: 110.00     Types: Cigarettes     Start date: 10     Quit date: 1998     Years since quittin.5    Smokeless tobacco: Former     Quit date: 1999   Substance and Sexual Activity    Alcohol use: No    Drug use: No    Sexual activity: Not on file   Other Topics Concern    Not on file   Social History Narrative    Not on file     Social Determinants of Health     Financial Resource Strain: Not on file   Food Insecurity: Not on file   Transportation Needs: Not on file   Physical Activity: Insufficiently Active    Days of Exercise per Week: 1 day    Minutes of Exercise per Session: 20 min   Stress: Not on file   Social Connections: Not on file   Intimate Partner Violence: Not on file   Housing Stability: Not on file       Vitals:    22 1343   BP: 106/68   Pulse: 64   Temp: 97.4 °F (36.3 °C)   SpO2: 98%   Weight: 239 lb (108.4 kg)   Height: 5' 11\" (1.803 m)       Physical Exam:  Physical Exam  Vitals and nursing note reviewed. Constitutional:       General: He is not in acute distress. Appearance: Normal appearance. He is well-developed. He is obese. He is not ill-appearing. HENT:      Head: Normocephalic and atraumatic. Right Ear: Hearing and external ear normal.      Left Ear: Hearing and external ear normal.      Nose:      Comments: Wearing mask  Eyes:      General: Lids are normal. No scleral icterus. Extraocular Movements: Extraocular movements intact. Conjunctiva/sclera: Conjunctivae normal.   Neck:      Thyroid: No thyromegaly. Cardiovascular:      Rate and Rhythm: Normal rate and regular rhythm. Heart sounds: Normal heart sounds. No murmur heard. Pulmonary:      Effort: Pulmonary effort is normal. No respiratory distress. Breath sounds: Normal breath sounds. No wheezing. Musculoskeletal:         General: No tenderness or deformity. Normal range of motion. Cervical back: Normal range of motion and neck supple. Right lower leg: No edema. Left lower leg: No edema.    Lymphadenopathy:      Cervical: No cervical adenopathy. Skin:     General: Skin is warm and dry. Findings: No rash. Comments: Lipoma noted to mid thoracic spine   Neurological:      General: No focal deficit present. Mental Status: He is alert and oriented to person, place, and time. Gait: Gait normal.   Psychiatric:         Mood and Affect: Mood and affect normal.         Speech: Speech normal.         Behavior: Behavior normal.         Thought Content:  Thought content normal.       Labs:  CBC with Differential:    Lab Results   Component Value Date/Time    WBC 8.6 01/04/2022 02:16 PM    RBC 5.51 01/04/2022 02:16 PM    HGB 16.8 01/04/2022 02:16 PM    HCT 50.3 01/04/2022 02:16 PM     01/04/2022 02:16 PM    MCV 91.3 01/04/2022 02:16 PM    MCH 30.5 01/04/2022 02:16 PM    MCHC 33.4 01/04/2022 02:16 PM    RDW 13.7 01/04/2022 02:16 PM    SEGSPCT 85.5 07/18/2020 11:35 PM    LYMPHOPCT 17.7 01/04/2022 02:16 PM    MONOPCT 7.0 01/04/2022 02:16 PM    BASOPCT 0.7 01/04/2022 02:16 PM    MONOSABS 0.60 01/04/2022 02:16 PM    LYMPHSABS 1.52 01/04/2022 02:16 PM    EOSABS 0.32 01/04/2022 02:16 PM    BASOSABS 0.06 01/04/2022 02:16 PM     CMP:    Lab Results   Component Value Date/Time     01/04/2022 02:16 PM    K 3.9 01/04/2022 02:16 PM    CL 99 01/04/2022 02:16 PM    CO2 22 01/04/2022 02:16 PM    BUN 21 01/04/2022 02:16 PM    CREATININE 1.7 01/04/2022 02:16 PM    GFRAA 48 01/04/2022 02:16 PM    AGRATIO 1.1 07/18/2020 11:35 PM    LABGLOM 40 01/04/2022 02:16 PM    GLUCOSE 217 01/04/2022 02:16 PM    PROT 7.8 01/04/2022 02:16 PM    LABALBU 3.8 01/04/2022 02:16 PM    CALCIUM 9.2 01/04/2022 02:16 PM    BILITOT 0.5 01/04/2022 02:16 PM    ALKPHOS 87 01/04/2022 02:16 PM    AST 22 01/04/2022 02:16 PM    ALT 19 01/04/2022 02:16 PM     Uric Acid:    Lab Results   Component Value Date/Time    LABURIC 4.1 03/17/2020 02:14 PM     HgBA1c:    Lab Results   Component Value Date/Time    LABA1C 9.2 01/04/2022 02:16 PM     Microalbumen/Creatinine ratio:  No components found for: RUCREAT  FLP:    Lab Results   Component Value Date/Time    TRIG 196 01/04/2022 02:16 PM    HDL 32 01/04/2022 02:16 PM    LDLCALC 66 01/04/2022 02:16 PM    LABVLDL 39 01/04/2022 02:16 PM     TSH:    Lab Results   Component Value Date/Time    TSH 1.420 01/04/2022 02:16 PM     PSA:   Lab Results   Component Value Date/Time    PSA 0.33 02/25/2021 09:51 AM        Assessment and Plan:  Prashant Ramos was seen today for diabetes. Diagnoses and all orders for this visit:    Type 2 diabetes mellitus with diabetic polyneuropathy, with long-term current use of insulin ([facility])  A1c significantly improved now at 6.6%. Following with [facility] for management. Due for routine labs- already ordered    Diabetic polyneuropathy associated with type 2 diabetes mellitus ([facility] [address]  Stable    Benign essential hypertension  Well controlled. Coronary artery disease involving native coronary artery of native heart without angina pectoris  Had stress test completed due to history and new job. Stable    Mixed hyperlipidemia    Stage 3a chronic kidney disease (HCC)  Improved overall    Secondary hyperparathyroidism of renal origin ([facility] [address]  Follows with Nephro at [facility]        Return in about 6 months (around 1/28/2023), or if symptoms worsen or fail to improve, for Chronic medical conditions.       Seen By:  Stephani Willett DO

## 2022-10-02 DIAGNOSIS — E78.2 MIXED HYPERLIPIDEMIA: ICD-10-CM

## 2022-10-03 RX ORDER — ATORVASTATIN CALCIUM 40 MG/1
TABLET, FILM COATED ORAL
Qty: 90 TABLET | Refills: 1 | Status: SHIPPED | OUTPATIENT
Start: 2022-10-03

## 2022-10-24 ENCOUNTER — PROCEDURE VISIT (OUTPATIENT)
Dept: PODIATRY | Age: 72
End: 2022-10-24
Payer: MEDICARE

## 2022-10-24 VITALS
DIASTOLIC BLOOD PRESSURE: 78 MMHG | SYSTOLIC BLOOD PRESSURE: 133 MMHG | BODY MASS INDEX: 33.33 KG/M2 | TEMPERATURE: 98.2 F | WEIGHT: 239 LBS

## 2022-10-24 DIAGNOSIS — B35.1 TINEA UNGUIUM: Primary | ICD-10-CM

## 2022-10-24 DIAGNOSIS — I73.9 PERIPHERAL VASCULAR DISEASE, UNSPECIFIED (HCC): ICD-10-CM

## 2022-10-24 DIAGNOSIS — M79.674 PAIN IN TOE OF RIGHT FOOT: ICD-10-CM

## 2022-10-24 DIAGNOSIS — M79.675 PAIN IN LEFT TOE(S): ICD-10-CM

## 2022-10-24 DIAGNOSIS — Z79.4 TYPE 2 DIABETES MELLITUS WITHOUT COMPLICATION, WITH LONG-TERM CURRENT USE OF INSULIN (HCC): ICD-10-CM

## 2022-10-24 DIAGNOSIS — E11.9 TYPE 2 DIABETES MELLITUS WITHOUT COMPLICATION, WITH LONG-TERM CURRENT USE OF INSULIN (HCC): ICD-10-CM

## 2022-10-24 PROCEDURE — 11721 DEBRIDE NAIL 6 OR MORE: CPT | Performed by: PODIATRIST

## 2022-10-24 ASSESSMENT — ENCOUNTER SYMPTOMS
RESPIRATORY NEGATIVE: 1
BACK PAIN: 1
EYES NEGATIVE: 1

## 2022-10-24 NOTE — PROGRESS NOTES
[address] [place] [id number]  Dept: [phone]  Dept Fax: [phone]    DIABETIC NAIL PROGRESS NOTE  Date of patient's visit: 10/24/2022  Patient's Name:  Pj Reeder YOB: 1950            Patient Care Team:  Merline Broody, DO as PCP - General (Family Medicine)  Merline Broody, DO as PCP - [facility] Empaneled Provider  Paul Lehman DPM as Consulting Physician (Podiatry)          Chief Complaint   Patient presents with    Diabetes    Toe Pain     Saw pcp Dr. Merline Broody 10/2/22       Subjective: Pj Reeder comes to clinic for Diabetes and Toe Pain (Saw pcp Dr. Merline Broody 10/2/22)    he is a diabetic and states that diabetic foot exam .  Pt currently has complaint of thickened, elongated nails that they cannot manage by themselves. Pt's primary care physician is Merline Broody, DO last seen       Lab Results   Component Value Date    LABA1C 9.2 (H) 01/04/2022      Complains of numbness in the feet bilat. Past Medical History:   Diagnosis Date    CAD (coronary artery disease)     Diabetes mellitus ([address]     GERD (gastroesophageal reflux disease) 11/18/2016    Hyperlipidemia     Hypertension     Obesity     Stage 3b chronic kidney disease (HCC)        Allergies   Allergen Reactions    Clindamycin Hcl      C. Diff. Cymbalta [Duloxetine Hcl]     Lactose Intolerance (Gi)     Metformin      Current Outpatient Medications on File Prior to Visit   Medication Sig Dispense Refill    atorvastatin (LIPITOR) 40 MG tablet Take 1 tablet by mouth once daily 90 tablet 1    TOUJEO SOLOSTAR 300 UNIT/ML SOPN INJECT 60 UNITS SUBCUTANEOUSLY ONCE DAILY      LOKELMA 10 g PACK oral suspension MIX 1 PACKET IN 45 ML OF WATER & DRINK THREE TIMES DAILY FOR 2 DAYS AND THEN MIX 1 PACKET AND DRINK ONCE DAILY FOR 24 DAYS (STIR AND DRINK IMMEDIATLEY)      triamcinolone (ARISTOCORT) 0.5 % cream Apply topically 3 times daily.  15 g 1    pantoprazole (PROTONIX) 40 MG tablet Take 1 tablet by mouth once daily 90 tablet 1    QUEtiapine (SEROQUEL) 25 MG tablet TAKE 1 TABLET BY MOUTH ONCE DAILY AS NEEDED FOR INSOMNIA 90 tablet 1    Insulin Pen Needle (B-D UF III MINI PEN NEEDLES) 31G X 5 MM MISC USE AS DIRECTED FOR  INSULIN  ADMINISTRATION 300 each 5    amLODIPine (NORVASC) 10 MG tablet TAKE 1 TABLET BY MOUTH ONCE DAILY 90 tablet 1    JARDIANCE 25 MG tablet Take 1 tablet by mouth once daily with breakfast 90 tablet 1    metoprolol succinate (TOPROL XL) 25 MG extended release tablet Take 1 tablet by mouth once daily 90 tablet 1    pioglitazone (ACTOS) 30 MG tablet       hydroCHLOROthiazide (HYDRODIURIL) 25 MG tablet Take 25 mg by mouth daily      ZINC PO Take by mouth      Continuous Blood Gluc  (FREESTYLE [place] 14 DAY READER) JOSSE Use as directed to monitor blood glucose 1 Device 1    vitamin C (ASCORBIC ACID) 500 MG tablet Take 500 mg by mouth daily      aspirin 81 MG chewable tablet Take 81 mg by mouth daily      pregabalin (LYRICA) 150 MG capsule Take 1 capsule by mouth 2 times daily for 90 days. 180 capsule 1    losartan (COZAAR) 50 MG tablet Take 25 mg by mouth daily      [DISCONTINUED] Continuous Blood Gluc Sensor (FREESTYLE [place] 14 DAY SENSOR) MISC Use as directed to monitor blood glucose 6 each 1     No current facility-administered medications on file prior to visit. Review of Systems   Constitutional: Negative. HENT: Negative. Eyes: Negative. Respiratory: Negative. Cardiovascular: Negative. Endocrine: Negative. Musculoskeletal:  Positive for arthralgias, back pain and gait problem. Review of Systems:   History obtained from chart review and the patient  General ROS: negative for - chills, fatigue, fever, night sweats or weight gain  Constitutional: Negative for chills, diaphoresis, fatigue, fever and unexpected weight change.   Musculoskeletal: Positive for arthralgias, gait problem and joint swelling. Neurological ROS: negative for - behavioral changes, confusion, headaches or seizures. Negative for weakness and numbness. Dermatological ROS: negative for - mole changes, rash  Cardiovascular: Negative for leg swelling. Gastrointestinal: Negative for constipation, diarrhea, nausea and vomiting. Objective:  General: AAO x 3 in NAD. Derm  Toenail Description nails are thick and mycotic yellow incurvated causing pain with shoe gear  Sites of Onychomycosis Involvement (Check affected area)  [x] [x] [x] [x] [x] [x] [x] [x] [x] [x]  5 4 3 2 1 1 2 3 4 5                          Right                                        Left    Thickness  [x] [x] [x] [x] [x] [x] [x] [x] [x] [x]  5 4 3 2 1 1 2 3 4 5                         Right                                        Left    Dystrophic Changes   [x] [x] [x] [x] [x] [x] [x] [x] [x] [x]  5 4 3 2 1 1 2 3 4 5                         Right                                        Left    Color   [x] [x] [x] [x] [x] [x] [x] [x] [x] [x]  5 4 3 2 1 1 2 3 4 5                          Right                                        Left    Incurvation/Ingrowin   [x] [x] [x] [x] [x] [x] [x] [x] [x] [x]  5 4 3 2 1 1 2 3 4 5                         Right                                        Left    Inflammation/Pain   [x] [x] [x] [x] [x] [x] [x] [x] [x] [x]  5 4 3 2 1 1 2 3 4 5                         Right                                        Left      Dermatologic Exam:  Skin lesion/ulceration .    Skin   Loss of hair  lower extremity      Musculoskeletal:     1st MPJ ROM decreased, Bilateral.  Muscle Bilateral.  Pain present upon palpation of toenails 1-5, Bilateral. decreased medial longitudinal arch, Bilateral.  Ankle ROM decreased,Bilateral.    Dorsally contracted digits     Vascular: DP and PT pulses absnet  CFT < seconds, Bilateral.  Hair growth absent to the level of the digits, Bilateral.  Edema  Bilateral.  Varicosities  Bilateral.     Neurological: Sensation diminshed to light touch to level of digits, Bilateral.  Protective sensation  sites via 5.07/10g [place] Monofilament, Bilateral.  negative Tinel's, Bilateral.  negative Valleix sign, Bilateral.      Integument: nails   thickened > 3.0 mm, dystrophic and crumbly, discolored with subungual debris. Toes cool to touch    Visual inspection:  Deformity: hammertoe deformity rachael feet  amputation: absent    Edema:   Sensory exam:  Monofilament sensation: abnormal - 6/10 via SW 5.07/10g monofilament to the plantar foot bilateral feet    Pulses:     Pinprick: Impaired  Proprioception: Impaired  Vibration (128 Hz): Impaired       DM with PVD       [x]Yes    []no    Foot Exam    General  General Appearance: appears stated age and healthy   Orientation: alert and oriented to person, place, and time       Right Foot/Ankle     Inspection and Palpation  Skin Exam: skin changes and abnormal color; (There is loss of hair to lower extremity mild discoloration to the lower extremity coolness to touch to the toes nails are thick and yellow dystrophic)    Neurovascular  Dorsalis pedis: 2+  Posterior tibial: absent      Left Foot/Ankle      Inspection and Palpation  Skin Exam: skin changes; Neurovascular  Dorsalis pedis: 2+  Posterior tibial: absent         Ortho Exam      Assessment:  74 y.o. male with:  1. Tinea unguium    2. Type 2 diabetes mellitus without complication, with long-term current use of insulin (HCC)    3. Pain in toe of right foot    4. Pain in left toe(s)    5. Peripheral vascular disease, unspecified ([facility] [address] [facility]       No orders of the defined types were placed in this encounter. Q7   []Yes    []No                Q8   [x]Yes    []No                     Q9   []Yes    []No    Plan:   Pt was evaluated and examined. Patient was given personalized discharge instructions. Nails 1-10 were debrided sharply in length and thickness with a nipper and , without incident.  Pt will follow up in 3 months or sooner if any problems arise. Diagnosis was discussed with the pt and all of their questions were answered in detail. Proper foot hygiene and care was discussed with the pt. Informed patient on proper diabetic foot care and importance of tight glycemic control. Patient to check feet daily and contact the office with any questions/problems/concerns. Other comorbidity noted and will be managed by PCP. 10/24/2022    Electronically signed by Lucy Rondon DPM on 10/24/2022 at 1:11 PM  10/24/2022     It was discussed in detail with the patient proper hygiene for the diabetic foot. They are to get into a habit of looking at their feet or have someone look at them. If they are unable to daily, they are to look for any signs of redness, blistering, cracking, swelling, drainage, open lesions, etc.  They are to dry in-between the toes after each bath or shower gently. The water should be tested with their elbow to prevent burns. The patient is to refrain from soaking their feet unless instructed by myself to do otherwise. They are to refrain from going barefoot. Shoe gear should be inspected for any foreign objects. Shoes should have a deep, wide toe box area. With any type of shoe, the feet should be inspected for any signs of pressure, i.e., redness, blistering, or open lesions. The patient was instructed to contact myself or other health care provider with any questions.

## 2022-10-25 DIAGNOSIS — Z79.4 TYPE 2 DIABETES MELLITUS WITH DIABETIC POLYNEUROPATHY, WITH LONG-TERM CURRENT USE OF INSULIN (HCC): ICD-10-CM

## 2022-10-25 DIAGNOSIS — E11.42 TYPE 2 DIABETES MELLITUS WITH DIABETIC POLYNEUROPATHY, WITH LONG-TERM CURRENT USE OF INSULIN (HCC): ICD-10-CM

## 2022-10-26 RX ORDER — PREGABALIN 150 MG/1
CAPSULE ORAL
Qty: 180 CAPSULE | Refills: 0 | Status: SHIPPED | OUTPATIENT
Start: 2022-10-26 | End: 2023-01-24

## 2022-10-26 RX ORDER — EMPAGLIFLOZIN 25 MG/1
TABLET, FILM COATED ORAL
Qty: 90 TABLET | Refills: 0 | Status: SHIPPED | OUTPATIENT
Start: 2022-10-26

## 2022-11-27 DIAGNOSIS — K21.9 GASTROESOPHAGEAL REFLUX DISEASE WITHOUT ESOPHAGITIS: ICD-10-CM

## 2022-11-27 DIAGNOSIS — F34.1 DYSTHYMIA: ICD-10-CM

## 2022-11-28 RX ORDER — PANTOPRAZOLE SODIUM 40 MG/1
TABLET, DELAYED RELEASE ORAL
Qty: 90 TABLET | Refills: 0 | Status: SHIPPED | OUTPATIENT
Start: 2022-11-28

## 2022-11-28 RX ORDER — QUETIAPINE FUMARATE 25 MG/1
TABLET, FILM COATED ORAL
Qty: 90 TABLET | Refills: 0 | Status: SHIPPED | OUTPATIENT
Start: 2022-11-28

## 2023-01-23 ENCOUNTER — PROCEDURE VISIT (OUTPATIENT)
Dept: PODIATRY | Age: 73
End: 2023-01-23
Payer: MEDICARE

## 2023-01-23 VITALS
TEMPERATURE: 98.2 F | DIASTOLIC BLOOD PRESSURE: 72 MMHG | BODY MASS INDEX: 33.33 KG/M2 | SYSTOLIC BLOOD PRESSURE: 119 MMHG | WEIGHT: 239 LBS

## 2023-01-23 DIAGNOSIS — M79.674 PAIN IN TOE OF RIGHT FOOT: ICD-10-CM

## 2023-01-23 DIAGNOSIS — M79.675 PAIN IN LEFT TOE(S): ICD-10-CM

## 2023-01-23 DIAGNOSIS — Z79.4 TYPE 2 DIABETES MELLITUS WITHOUT COMPLICATION, WITH LONG-TERM CURRENT USE OF INSULIN (HCC): ICD-10-CM

## 2023-01-23 DIAGNOSIS — I73.9 PERIPHERAL VASCULAR DISEASE, UNSPECIFIED (HCC): ICD-10-CM

## 2023-01-23 DIAGNOSIS — E11.9 TYPE 2 DIABETES MELLITUS WITHOUT COMPLICATION, WITH LONG-TERM CURRENT USE OF INSULIN (HCC): ICD-10-CM

## 2023-01-23 DIAGNOSIS — B35.1 TINEA UNGUIUM: Primary | ICD-10-CM

## 2023-01-23 PROCEDURE — 11721 DEBRIDE NAIL 6 OR MORE: CPT | Performed by: PODIATRIST

## 2023-01-23 ASSESSMENT — ENCOUNTER SYMPTOMS
RESPIRATORY NEGATIVE: 1
EYES NEGATIVE: 1
BACK PAIN: 1

## 2023-01-23 NOTE — PROGRESS NOTES
[address] [place] [id number]  Dept: [phone]  Dept Fax: [phone]    DIABETIC NAIL PROGRESS NOTE  Date of patient's visit: 1/23/2023  Patient's Name:  Kira Damon YOB: 1950            Patient Care Team:  Luiz Madrid DO as PCP - General (Family Medicine)  Luiz Madrid DO as PCP - [facility] Empaneled Provider  Roopa Guerra DPM as Consulting Physician (Podiatry)          Chief Complaint   Patient presents with    Diabetes    Toe Pain     Saw pcp Dr. Luiz Madrid 12/16/22       Subjective: Kira Damon comes to clinic for Diabetes and Toe Pain (Saw pcp Dr. Luiz Madrid 12/16/22)    he is a diabetic and states that diabetic foot exam .  Pt currently has complaint of thickened, elongated nails that they cannot manage by themselves. Pt's primary care physician is Luiz Madrid DO      Lab Results   Component Value Date    LABA1C 9.2 (H) 01/04/2022      Complains of numbness in the feet bilat. Past Medical History:   Diagnosis Date    CAD (coronary artery disease)     Diabetes mellitus ([facility] [address]     GERD (gastroesophageal reflux disease) 11/18/2016    Hyperlipidemia     Hypertension     Obesity     Stage 3b chronic kidney disease (HCC)        Allergies   Allergen Reactions    Clindamycin Hcl      C. Diff.       Cymbalta [Duloxetine Hcl]     Lactose Intolerance (Gi)     Metformin      Current Outpatient Medications on File Prior to Visit   Medication Sig Dispense Refill    QUEtiapine (SEROQUEL) 25 MG tablet TAKE 1 TABLET BY MOUTH ONCE DAILY AS NEEDED FOR INSOMNIA 90 tablet 0    pantoprazole (PROTONIX) 40 MG tablet Take 1 tablet by mouth once daily 90 tablet 0    JARDIANCE 25 MG tablet Take 1 tablet by mouth once daily with breakfast 90 tablet 0    pregabalin (LYRICA) 150 MG capsule Take 1 capsule by mouth twice daily 180 capsule 0    atorvastatin (LIPITOR) 40 MG tablet Take 1 tablet by mouth once daily 90 tablet 1    TOUJEO SOLOSTAR 300 UNIT/ML SOPN INJECT 60 UNITS SUBCUTANEOUSLY ONCE DAILY      LOKELMA 10 g PACK oral suspension MIX 1 PACKET IN 45 ML OF WATER & DRINK THREE TIMES DAILY FOR 2 DAYS AND THEN MIX 1 PACKET AND DRINK ONCE DAILY FOR 24 DAYS (STIR AND DRINK IMMEDIATLEY)      triamcinolone (ARISTOCORT) 0.5 % cream Apply topically 3 times daily. 15 g 1    Insulin Pen Needle (B-D UF III MINI PEN NEEDLES) 31G X 5 MM MISC USE AS DIRECTED FOR  INSULIN  ADMINISTRATION 300 each 5    amLODIPine (NORVASC) 10 MG tablet TAKE 1 TABLET BY MOUTH ONCE DAILY 90 tablet 1    metoprolol succinate (TOPROL XL) 25 MG extended release tablet Take 1 tablet by mouth once daily 90 tablet 1    pioglitazone (ACTOS) 30 MG tablet       hydroCHLOROthiazide (HYDRODIURIL) 25 MG tablet Take 25 mg by mouth daily      ZINC PO Take by mouth      Continuous Blood Gluc  (FREESTYLE [place] 14 DAY READER) JOSSE Use as directed to monitor blood glucose 1 Device 1    vitamin C (ASCORBIC ACID) 500 MG tablet Take 500 mg by mouth daily      aspirin 81 MG chewable tablet Take 81 mg by mouth daily      losartan (COZAAR) 50 MG tablet Take 25 mg by mouth daily      [DISCONTINUED] Continuous Blood Gluc Sensor (FREESTYLE [place] 14 DAY SENSOR) MISC Use as directed to monitor blood glucose 6 each 1     No current facility-administered medications on file prior to visit. Review of Systems   Constitutional: Negative. HENT: Negative. Eyes: Negative. Respiratory: Negative. Cardiovascular: Negative. Endocrine: Negative. Musculoskeletal:  Positive for arthralgias, back pain and gait problem. Review of Systems:   History obtained from chart review and the patient  General ROS: negative for - chills, fatigue, fever, night sweats or weight gain  Constitutional: Negative for chills, diaphoresis, fatigue, fever and unexpected weight change. Musculoskeletal: Positive for arthralgias, gait problem and joint swelling.   Neurological ROS: negative for - behavioral changes, confusion, headaches or seizures. Negative for weakness and numbness. Dermatological ROS: negative for - mole changes, rash  Cardiovascular: Negative for leg swelling. Gastrointestinal: Negative for constipation, diarrhea, nausea and vomiting. Objective:  General: AAO x 3 in NAD. Derm  Toenail Description nails are thick and mycotic yellow incurvated causing pain with shoe gear  Sites of Onychomycosis Involvement (Check affected area)  [x] [x] [x] [x] [x] [x] [x] [x] [x] [x]  5 4 3 2 1 1 2 3 4 5                          Right                                        Left    Thickness  [x] [x] [x] [x] [x] [x] [x] [x] [x] [x]  5 4 3 2 1 1 2 3 4 5                         Right                                        Left    Dystrophic Changes   [x] [x] [x] [x] [x] [x] [x] [x] [x] [x]  5 4 3 2 1 1 2 3 4 5                         Right                                        Left    Color   [x] [x] [x] [x] [x] [x] [x] [x] [x] [x]  5 4 3 2 1 1 2 3 4 5                          Right                                        Left    Incurvation/Ingrowin   [x] [x] [x] [x] [x] [x] [x] [x] [x] [x]  5 4 3 2 1 1 2 3 4 5                         Right                                        Left    Inflammation/Pain   [x] [x] [x] [x] [x] [x] [x] [x] [x] [x]  5 4 3 2 1 1 2 3 4 5                         Right                                        Left      Dermatologic Exam:  Skin lesion/ulceration .    Skin   Loss of hair  lower extremity      Musculoskeletal:     1st MPJ ROM decreased, Bilateral.  Muscle Bilateral.  Pain present upon palpation of toenails 1-5, Bilateral. decreased medial longitudinal arch, Bilateral.  Ankle ROM decreased,Bilateral.    Dorsally contracted digits     Vascular: DP and PT pulses absnet  CFT < seconds, Bilateral.  Hair growth absent to the level of the digits, Bilateral.  Edema  Bilateral.  Varicosities  Bilateral.     Neurological: Sensation diminshed to light touch to level of digits, Bilateral.  Protective sensation  sites via 5.07/10g [place] Monofilament, Bilateral.  negative Tinel's, Bilateral.  negative Valleix sign, Bilateral.      Integument: nails   thickened > 3.0 mm, dystrophic and crumbly, discolored with subungual debris. Toes cool to touch    Visual inspection:  Deformity: hammertoe deformity rachael feet  amputation: absent    Edema:   Sensory exam:  Monofilament sensation: abnormal - 6/10 via SW 5.07/10g monofilament to the plantar foot bilateral feet    Pulses:     Pinprick: Impaired  Proprioception: Impaired  Vibration (128 Hz): Impaired       DM with PVD       [x]Yes    []no    Foot Exam    General  General Appearance: appears stated age and healthy   Orientation: alert and oriented to person, place, and time       Right Foot/Ankle     Inspection and Palpation  Skin Exam: skin changes and abnormal color; (There is loss of hair to lower extremity mild discoloration to the lower extremity coolness to touch to the toes nails are thick and yellow dystrophic)    Neurovascular  Dorsalis pedis: 2+  Posterior tibial: absent      Left Foot/Ankle      Inspection and Palpation  Skin Exam: skin changes; Neurovascular  Dorsalis pedis: 2+  Posterior tibial: absent         Ortho Exam      Assessment:  74 y.o. male with:  1. Tinea unguium    2. Pain in toe of right foot    3. Type 2 diabetes mellitus without complication, with long-term current use of insulin (HCC)    4. Pain in left toe(s)    5. Peripheral vascular disease, unspecified ([facility] [address]       No orders of the defined types were placed in this encounter. Q7   []Yes    []No                Q8   [x]Yes    []No                     Q9   []Yes    []No    Plan:   Pt was evaluated and examined. Patient was given personalized discharge instructions. Nails 1-10 were debrided sharply in length and thickness with a nipper and , without incident.  Pt will follow up in 3 months or sooner if any problems arise. Diagnosis was discussed with the pt and all of their questions were answered in detail. Proper foot hygiene and care was discussed with the pt. Informed patient on proper diabetic foot care and importance of tight glycemic control. Patient to check feet daily and contact the office with any questions/problems/concerns. Other comorbidity noted and will be managed by PCP.  1/23/2023    Electronically signed by Suman Shields DPM on 1/23/2023 at 1:14 PM  1/23/2023     It was discussed in detail with the patient proper hygiene for the diabetic foot. They are to get into a habit of looking at their feet or have someone look at them. If they are unable to daily, they are to look for any signs of redness, blistering, cracking, swelling, drainage, open lesions, etc.  They are to dry in-between the toes after each bath or shower gently. The water should be tested with their elbow to prevent burns. The patient is to refrain from soaking their feet unless instructed by myself to do otherwise. They are to refrain from going barefoot. Shoe gear should be inspected for any foreign objects. Shoes should have a deep, wide toe box area. With any type of shoe, the feet should be inspected for any signs of pressure, i.e., redness, blistering, or open lesions. The patient was instructed to contact myself or other health care provider with any questions.

## 2023-01-23 NOTE — LETTER
[facility]  [address] [facility] [address]  Phone: [phone]  Fax: [phone]    Abelardo Dee DPM        January 23, 2023     Patient: Eben Alfaro   YOB: 1950   Date of Visit: 1/23/2023       To Whom it May Concern:    Eben Alfaro was seen in my clinic on 1/23/2023.     If you have any questions or concerns, please don't hesitate to call.    Sincerely,         Abelardo Dee DPM

## 2023-01-30 ENCOUNTER — OFFICE VISIT (OUTPATIENT)
Dept: PRIMARY CARE CLINIC | Age: 73
End: 2023-01-30
Payer: MEDICARE

## 2023-01-30 VITALS
WEIGHT: 226 LBS | SYSTOLIC BLOOD PRESSURE: 128 MMHG | DIASTOLIC BLOOD PRESSURE: 60 MMHG | TEMPERATURE: 97.3 F | HEIGHT: 71 IN | BODY MASS INDEX: 31.64 KG/M2 | OXYGEN SATURATION: 96 % | HEART RATE: 67 BPM

## 2023-01-30 DIAGNOSIS — Z79.4 TYPE 2 DIABETES MELLITUS WITH DIABETIC POLYNEUROPATHY, WITH LONG-TERM CURRENT USE OF INSULIN (HCC): Primary | ICD-10-CM

## 2023-01-30 DIAGNOSIS — R20.2 NUMBNESS AND TINGLING IN RIGHT HAND: ICD-10-CM

## 2023-01-30 DIAGNOSIS — Z91.81 AT HIGH RISK FOR FALLS: ICD-10-CM

## 2023-01-30 DIAGNOSIS — I10 BENIGN ESSENTIAL HYPERTENSION: ICD-10-CM

## 2023-01-30 DIAGNOSIS — R26.81 GAIT INSTABILITY: ICD-10-CM

## 2023-01-30 DIAGNOSIS — E11.42 DIABETIC POLYNEUROPATHY ASSOCIATED WITH TYPE 2 DIABETES MELLITUS (HCC): ICD-10-CM

## 2023-01-30 DIAGNOSIS — K21.9 GASTROESOPHAGEAL REFLUX DISEASE WITHOUT ESOPHAGITIS: ICD-10-CM

## 2023-01-30 DIAGNOSIS — N18.31 STAGE 3A CHRONIC KIDNEY DISEASE (HCC): ICD-10-CM

## 2023-01-30 DIAGNOSIS — R20.0 NUMBNESS AND TINGLING IN RIGHT HAND: ICD-10-CM

## 2023-01-30 DIAGNOSIS — E11.42 TYPE 2 DIABETES MELLITUS WITH DIABETIC POLYNEUROPATHY, WITH LONG-TERM CURRENT USE OF INSULIN (HCC): Primary | ICD-10-CM

## 2023-01-30 DIAGNOSIS — I25.10 CORONARY ARTERY DISEASE INVOLVING NATIVE CORONARY ARTERY OF NATIVE HEART WITHOUT ANGINA PECTORIS: ICD-10-CM

## 2023-01-30 PROCEDURE — 99214 OFFICE O/P EST MOD 30 MIN: CPT | Performed by: FAMILY MEDICINE

## 2023-01-30 PROCEDURE — 3078F DIAST BP <80 MM HG: CPT | Performed by: FAMILY MEDICINE

## 2023-01-30 PROCEDURE — 1123F ACP DISCUSS/DSCN MKR DOCD: CPT | Performed by: FAMILY MEDICINE

## 2023-01-30 PROCEDURE — 3074F SYST BP LT 130 MM HG: CPT | Performed by: FAMILY MEDICINE

## 2023-01-30 RX ORDER — SODIUM BICARBONATE 650 MG/1
TABLET ORAL
COMMUNITY
Start: 2022-11-20

## 2023-01-30 RX ORDER — PEN NEEDLE, DIABETIC 31 GX5/16"
NEEDLE, DISPOSABLE MISCELLANEOUS
Qty: 300 EACH | Refills: 5 | Status: SHIPPED | OUTPATIENT
Start: 2023-01-30

## 2023-01-30 RX ORDER — PREGABALIN 150 MG/1
CAPSULE ORAL
Qty: 180 CAPSULE | Refills: 1 | Status: SHIPPED | OUTPATIENT
Start: 2023-01-30 | End: 2023-04-30

## 2023-01-30 RX ORDER — PANTOPRAZOLE SODIUM 40 MG/1
TABLET, DELAYED RELEASE ORAL
Qty: 90 TABLET | Refills: 1 | Status: SHIPPED | OUTPATIENT
Start: 2023-01-30

## 2023-01-30 RX ORDER — DULAGLUTIDE 4.5 MG/.5ML
INJECTION, SOLUTION SUBCUTANEOUS
COMMUNITY
Start: 2023-01-28

## 2023-01-30 RX ORDER — AMLODIPINE BESYLATE 10 MG/1
TABLET ORAL
Qty: 90 TABLET | Refills: 1 | Status: SHIPPED | OUTPATIENT
Start: 2023-01-30

## 2023-01-30 RX ORDER — LOSARTAN POTASSIUM 25 MG/1
TABLET ORAL
COMMUNITY
Start: 2022-11-28

## 2023-01-30 SDOH — ECONOMIC STABILITY: FOOD INSECURITY: WITHIN THE PAST 12 MONTHS, YOU WORRIED THAT YOUR FOOD WOULD RUN OUT BEFORE YOU GOT MONEY TO BUY MORE.: NEVER TRUE

## 2023-01-30 SDOH — ECONOMIC STABILITY: FOOD INSECURITY: WITHIN THE PAST 12 MONTHS, THE FOOD YOU BOUGHT JUST DIDN'T LAST AND YOU DIDN'T HAVE MONEY TO GET MORE.: NEVER TRUE

## 2023-01-30 ASSESSMENT — PATIENT HEALTH QUESTIONNAIRE - PHQ9
3. TROUBLE FALLING OR STAYING ASLEEP: 1
1. LITTLE INTEREST OR PLEASURE IN DOING THINGS: 0
5. POOR APPETITE OR OVEREATING: 0
9. THOUGHTS THAT YOU WOULD BE BETTER OFF DEAD, OR OF HURTING YOURSELF: 0
7. TROUBLE CONCENTRATING ON THINGS, SUCH AS READING THE NEWSPAPER OR WATCHING TELEVISION: 1
2. FEELING DOWN, DEPRESSED OR HOPELESS: 0
SUM OF ALL RESPONSES TO PHQ9 QUESTIONS 1 & 2: 0
4. FEELING TIRED OR HAVING LITTLE ENERGY: 2
SUM OF ALL RESPONSES TO PHQ QUESTIONS 1-9: 4
SUM OF ALL RESPONSES TO PHQ QUESTIONS 1-9: 4
6. FEELING BAD ABOUT YOURSELF - OR THAT YOU ARE A FAILURE OR HAVE LET YOURSELF OR YOUR FAMILY DOWN: 0
SUM OF ALL RESPONSES TO PHQ QUESTIONS 1-9: 4
8. MOVING OR SPEAKING SO SLOWLY THAT OTHER PEOPLE COULD HAVE NOTICED. OR THE OPPOSITE, BEING SO FIGETY OR RESTLESS THAT YOU HAVE BEEN MOVING AROUND A LOT MORE THAN USUAL: 0
10. IF YOU CHECKED OFF ANY PROBLEMS, HOW DIFFICULT HAVE THESE PROBLEMS MADE IT FOR YOU TO DO YOUR WORK, TAKE CARE OF THINGS AT HOME, OR GET ALONG WITH OTHER PEOPLE: 0
SUM OF ALL RESPONSES TO PHQ QUESTIONS 1-9: 4

## 2023-01-30 ASSESSMENT — ENCOUNTER SYMPTOMS
CONSTIPATION: 0
COUGH: 0
BACK PAIN: 0
DIARRHEA: 0
VOMITING: 0
ROS SKIN COMMENTS: MASS ON BACK
ABDOMINAL PAIN: 0
WHEEZING: 0
SHORTNESS OF BREATH: 0
NAUSEA: 0

## 2023-01-30 ASSESSMENT — SOCIAL DETERMINANTS OF HEALTH (SDOH): HOW HARD IS IT FOR YOU TO PAY FOR THE VERY BASICS LIKE FOOD, HOUSING, MEDICAL CARE, AND HEATING?: NOT HARD AT ALL

## 2023-01-30 NOTE — PROGRESS NOTES
23  Wyona Boeck : 1950 Sex: male  Age: 74 y.o. Chief Complaint   Patient presents with    Diabetes     HPI:  74 y.o. male patient presents today for 3 month(s) follow up of chronic medical conditions, medication refills and FBW. Patient's chart, medical, surgical and medication history all reviewed. Diabetes Mellitus  74 y.o. male presents for follow up of type 2 diabetes. Current diabetic medications include: insulin injections: Toujeo 40U and Jardiance , Actos and Trulicity. Previous medications tried include: oral agents (dual therapy): glipizide (Glucotrol), metformin (generic) and insulin injections: Humalog/Novolog SSI , but failed due to renal impairment. Most recent HgA1c was 8% (2022)---6.6% (2022)---9.2% (2022)---9.1% (2021)---9.4% (2021)---9.8% (2020)---10.1% (2020)---9.6% (2019). His most recent TSH was 1.420. LDL is 66. Renal function is decreased (GFR= 53), but improved. The patient has evidence of end organ damage including nephropathy, peripheral neuropathy, and cardiovascular disease. He does see a Podiatrist for foot care- Dr. Jake Love. Last eye exam was unknown. He follows with [facility]. Hypertension   The patient presents today for follow up of HTN. The problem is well controlled. Did not take medication this AM.  Risk factors for coronary artery disease include Age > 27, male, previous MI, HTN, diabetes and elevated cholesterol. Current treatments include amlodipine (Norvasc), HCTZ and metoprolol (Lopressor, Toprol). The patient is compliant all of the time. Lifestyle changes the patient has made include  none . Today the patient is complaining of none.         Hyperlipidemia  The 10-year ASCVD risk score (Adonay SOTO, et al., 2019) is: 45.9%    Values used to calculate the score:      Age: 74 years      Sex: Male      Is Non- : No      Diabetic: Yes      Tobacco smoker: No      Systolic Blood Pressure: 716 mmHg      Is BP treated: Yes      HDL Cholesterol: 32 mg/dL      Total Cholesterol: 137 mg/dL    Having worsening issues with R hand \"tightness\" and numbness. ROS:  Review of Systems   Constitutional:  Negative for chills, fatigue and fever. Respiratory:  Negative for cough, shortness of breath and wheezing. Cardiovascular:  Negative for chest pain and palpitations. Gastrointestinal:  Negative for abdominal pain, constipation, diarrhea, nausea and vomiting. Musculoskeletal:  Positive for arthralgias. Negative for back pain. Skin:  Negative for rash. Mass on back   Neurological:  Positive for numbness. Negative for dizziness and headaches. Psychiatric/Behavioral:  Negative for dysphoric mood. The patient is not nervous/anxious. All other systems reviewed and are negative. Current Outpatient Medications on File Prior to Visit   Medication Sig Dispense Refill    TRULICITY 4.5 QB/8.2UW SOPN INJECT 1 SUBCUTANEOUSLY ONCE A WEEK      losartan (COZAAR) 25 MG tablet TAKE 1 TABLET BY MOUTH ONCE DAILY      sodium bicarbonate 650 MG tablet TAKE 1 TABLET BY MOUTH TWICE DAILY AS DIRECTED      QUEtiapine (SEROQUEL) 25 MG tablet TAKE 1 TABLET BY MOUTH ONCE DAILY AS NEEDED FOR INSOMNIA 90 tablet 0    pantoprazole (PROTONIX) 40 MG tablet Take 1 tablet by mouth once daily 90 tablet 0    JARDIANCE 25 MG tablet Take 1 tablet by mouth once daily with breakfast 90 tablet 0    atorvastatin (LIPITOR) 40 MG tablet Take 1 tablet by mouth once daily 90 tablet 1    TOUJEO SOLOSTAR 300 UNIT/ML SOPN INJECT 60 UNITS SUBCUTANEOUSLY ONCE DAILY      triamcinolone (ARISTOCORT) 0.5 % cream Apply topically 3 times daily.  15 g 1    Insulin Pen Needle (B-D UF III MINI PEN NEEDLES) 31G X 5 MM MISC USE AS DIRECTED FOR  INSULIN  ADMINISTRATION 300 each 5    amLODIPine (NORVASC) 10 MG tablet TAKE 1 TABLET BY MOUTH ONCE DAILY 90 tablet 1    metoprolol succinate (TOPROL XL) 25 MG extended release tablet Take 1 tablet by mouth once daily 90 tablet 1    pioglitazone (ACTOS) 30 MG tablet       hydroCHLOROthiazide (HYDRODIURIL) 25 MG tablet Take 25 mg by mouth daily      ZINC PO Take by mouth      Continuous Blood Gluc  (FREESTYLE [place] 14 DAY READER) JOSSE Use as directed to monitor blood glucose 1 Device 1    aspirin 81 MG chewable tablet Take 81 mg by mouth daily      pregabalin (LYRICA) 150 MG capsule Take 1 capsule by mouth twice daily 180 capsule 0    [DISCONTINUED] Continuous Blood Gluc Sensor (FREESTYLE [place] 14 DAY SENSOR) MISC Use as directed to monitor blood glucose 6 each 1     No current facility-administered medications on file prior to visit. Allergies   Allergen Reactions    Clindamycin Hcl      C. Diff.       Cymbalta [Duloxetine Hcl]     Duloxetine      Other reaction(s): Impending doom     Lactose Intolerance (Gi)     Metformin        Past Medical History:   Diagnosis Date    CAD (coronary artery disease)     Diabetes mellitus ([facility] [address]     GERD (gastroesophageal reflux disease) 11/18/2016    Hyperlipidemia     Hypertension     Obesity     Stage 3b chronic kidney disease ([facility] [address]      Past Surgical History:   Procedure Laterality Date    COLONOSCOPY      CORONARY ARTERY BYPASS GRAFT  11/23/2016    x4    ENDOSCOPY, COLON, DIAGNOSTIC      SKIN CANCER EXCISION  08/22/2017    SCC BERNAT- SCC 2018 LEFT EAR/NOSE/LEFT ARM     Family History   Problem Relation Age of Onset    Alzheimer's Disease Mother     Heart Disease Mother     Diabetes Mother     Heart Disease Father     Diabetes Paternal Grandmother      Social History     Socioeconomic History    Marital status:      Spouse name: Not on file    Number of children: Not on file    Years of education: Not on file    Highest education level: Not on file   Occupational History    Not on file   Tobacco Use    Smoking status: Former     Packs/day: 2.50     Years: 44.00     Pack years: 110.00     Types: Cigarettes     Start date:      Quit date:      Years since quittin.0    Smokeless tobacco: Former     Quit date: 1999   Substance and Sexual Activity    Alcohol use: No    Drug use: No    Sexual activity: Not on file   Other Topics Concern    Not on file   Social History Narrative    Not on file     Social Determinants of Health     Financial Resource Strain: Low Risk     Difficulty of Paying Living Expenses: Not hard at all   Food Insecurity: No Food Insecurity    Worried About Running Out of Food in the Last Year: Never true    Ran Out of Food in the Last Year: Never true   Transportation Needs: Not on file   Physical Activity: Insufficiently Active    Days of Exercise per Week: 1 day    Minutes of Exercise per Session: 20 min   Stress: Not on file   Social Connections: Not on file   Intimate Partner Violence: Not on file   Housing Stability: Not on file       Vitals:    23 1352   BP: 128/60   Pulse: 67   Temp: 97.3 °F (36.3 °C)   SpO2: 96%   Weight: 226 lb (102.5 kg)   Height: 5' 11\" (1.803 m)       Physical Exam:  Physical Exam  Vitals and nursing note reviewed.   Constitutional:       General: He is not in acute distress.     Appearance: Normal appearance. He is well-developed. He is obese. He is not ill-appearing.   HENT:      Head: Normocephalic and atraumatic.      Right Ear: Hearing and external ear normal.      Left Ear: Hearing and external ear normal.      Nose: Nose normal.      Mouth/Throat:      Mouth: Mucous membranes are moist.   Eyes:      General: Lids are normal. No scleral icterus.     Extraocular Movements: Extraocular movements intact.      Conjunctiva/sclera: Conjunctivae normal.   Neck:      Thyroid: No thyromegaly.      Vascular: No carotid bruit.   Cardiovascular:      Rate and Rhythm: Normal rate and regular rhythm.      Heart sounds: Normal heart sounds. No murmur heard.  Pulmonary:      Effort: Pulmonary effort is normal. No respiratory distress.      Breath sounds: Normal breath  sounds. No wheezing. Musculoskeletal:         General: No tenderness or deformity. Normal range of motion. Cervical back: Normal range of motion and neck supple. Right lower leg: No edema. Left lower leg: No edema. Lymphadenopathy:      Cervical: No cervical adenopathy. Skin:     General: Skin is warm and dry. Findings: No rash. Comments: Lipoma noted to mid thoracic spine   Neurological:      General: No focal deficit present. Mental Status: He is alert and oriented to person, place, and time. Gait: Gait normal.   Psychiatric:         Mood and Affect: Mood and affect normal.         Speech: Speech normal.         Behavior: Behavior normal.         Thought Content:  Thought content normal.       Labs:  CBC with Differential:    Lab Results   Component Value Date/Time    WBC 8.6 01/04/2022 02:16 PM    RBC 5.51 01/04/2022 02:16 PM    HGB 16.8 01/04/2022 02:16 PM    HCT 50.3 01/04/2022 02:16 PM     01/04/2022 02:16 PM    MCV 91.3 01/04/2022 02:16 PM    MCH 30.5 01/04/2022 02:16 PM    MCHC 33.4 01/04/2022 02:16 PM    RDW 13.7 01/04/2022 02:16 PM    SEGSPCT 85.5 07/18/2020 11:35 PM    LYMPHOPCT 17.7 01/04/2022 02:16 PM    MONOPCT 7.0 01/04/2022 02:16 PM    BASOPCT 0.7 01/04/2022 02:16 PM    MONOSABS 0.60 01/04/2022 02:16 PM    LYMPHSABS 1.52 01/04/2022 02:16 PM    EOSABS 0.32 01/04/2022 02:16 PM    BASOSABS 0.06 01/04/2022 02:16 PM     CMP:    Lab Results   Component Value Date/Time     01/04/2022 02:16 PM    K 3.9 01/04/2022 02:16 PM    CL 99 01/04/2022 02:16 PM    CO2 22 01/04/2022 02:16 PM    BUN 21 01/04/2022 02:16 PM    CREATININE 1.7 01/04/2022 02:16 PM    GFRAA 48 01/04/2022 02:16 PM    AGRATIO 1.1 07/18/2020 11:35 PM    LABGLOM 40 01/04/2022 02:16 PM    GLUCOSE 217 01/04/2022 02:16 PM    PROT 7.8 01/04/2022 02:16 PM    LABALBU 3.8 01/04/2022 02:16 PM    CALCIUM 9.2 01/04/2022 02:16 PM    BILITOT 0.5 01/04/2022 02:16 PM    ALKPHOS 87 01/04/2022 02:16 PM    AST 22 01/04/2022 02:16 PM    ALT 19 01/04/2022 02:16 PM     Uric Acid:    Lab Results   Component Value Date/Time    LABURIC 4.1 03/17/2020 02:14 PM     HgBA1c:    Lab Results   Component Value Date/Time    LABA1C 9.2 01/04/2022 02:16 PM     Microalbumen/Creatinine ratio:  No components found for: RUCREAT  FLP:    Lab Results   Component Value Date/Time    TRIG 196 01/04/2022 02:16 PM    HDL 32 01/04/2022 02:16 PM    LDLCALC 66 01/04/2022 02:16 PM    LABVLDL 39 01/04/2022 02:16 PM     TSH:    Lab Results   Component Value Date/Time    TSH 1.420 01/04/2022 02:16 PM     PSA:   Lab Results   Component Value Date/Time    PSA 0.33 02/25/2021 09:51 AM        Assessment and Plan:  Shazia Melvin was seen today for diabetes. Diagnoses and all orders for this visit:    Type 2 diabetes mellitus with diabetic polyneuropathy, with long-term current use of insulin ([facility])  -     Insulin Pen Needle (B-D UF III MINI PEN NEEDLES) 31G X 5 MM MISC; USE AS DIRECTED FOR  INSULIN  ADMINISTRATION  -     empagliflozin (JARDIANCE) 25 MG tablet; Take 1 tablet by mouth once daily with breakfast  -     pregabalin (LYRICA) 150 MG capsule; Take 1 capsule by mouth twice daily  Increased since June. Patient states that his Endo is more worried about him having lows than running high. Being seen through [facility]. Diabetic polyneuropathy associated with type 2 diabetes mellitus ([facility] [address]  -     External Referral To Physical Therapy    Benign essential hypertension  -     amLODIPine (NORVASC) 10 MG tablet; TAKE 1 TABLET BY MOUTH ONCE DAILY    Stage 3a chronic kidney disease (HCC)    Gastroesophageal reflux disease without esophagitis  -     pantoprazole (PROTONIX) 40 MG tablet;  One by mouth daily    Coronary artery disease involving native coronary artery of native heart without angina pectoris    Numbness and tingling in right hand  -     EMG; Future  Check EMG to r/o CTS    Gait instability  -     External Referral To Physical Therapy    At high risk for falls  -     External Referral To Physical Therapy          Return in about 6 months (around 7/30/2023), or if symptoms worsen or fail to improve, for AWV.       Seen By:  Daljit Moss, DO

## 2023-02-21 ENCOUNTER — HOSPITAL ENCOUNTER (OUTPATIENT)
Dept: NEUROLOGY | Age: 73
Discharge: HOME | End: 2023-02-21
Payer: MEDICARE

## 2023-02-21 VITALS — BODY MASS INDEX: 31.92 KG/M2 | HEIGHT: 71 IN | WEIGHT: 228 LBS

## 2023-02-21 DIAGNOSIS — R20.2 NUMBNESS AND TINGLING IN RIGHT HAND: ICD-10-CM

## 2023-02-21 DIAGNOSIS — R20.0 NUMBNESS AND TINGLING IN RIGHT HAND: ICD-10-CM

## 2023-02-21 PROCEDURE — 95885 MUSC TST DONE W/NERV TST LIM: CPT

## 2023-02-21 PROCEDURE — 95886 MUSC TEST DONE W/N TEST COMP: CPT

## 2023-02-21 PROCEDURE — 95910 NRV CNDJ TEST 7-8 STUDIES: CPT

## 2023-02-21 NOTE — PROCEDURES
[address]  [address], [address]  Phone: [phone]  Fax: [phone]      Referring Provider: Pinky Fuentes DO  Primary Care Physician: Basil Elliott DO  Patient Name: Ayaan Schaefer  Patient YOB: 1950  Gender: male  BMI: Body mass index is 31.8 kg/m². Height 5' 11\" (1.803 m), weight 228 lb (103.4 kg). 2/21/2023    Reason for referral: Numbness and tingling right hand    Description of clinical problem:   Reports pain in the right hand. He also endorses numbness/tingling and weakness of  in the right hand. He reports occasional numbness/tingling in the left hand, not as bad as the right side. He denies weakness of  on the left. He does endorse right-sided neck pain upon further questioning. Pain: Yes   ; Numbness/tingling: Yes; Weakness: Yes       Brief physical exam:   Sensory deficit: Yes, median nerve distribution right hand; Weakness: Yes, bilateral APB; Atrophy: Yes, bilateral APB    Study Limitations:  None    Motor NCS      Nerve / Sites Lat. Lat Diff Amplitude Amp. 1-2 Distance Velocity Temp.    ms ms mV % cm m/s °C   R Median - APB      Wrist NR  NR NR 8  32.2      Elbow NR NR NR NR   32.2   L Median - APB      Wrist 10.52  2.9 100 8  32.1      Elbow 17.08 6.56 2.6 91.8 22 34 32.1   R Ulnar - ADM      Wrist 3.02  9.2 100 8  32.2      B. Elbow 7.45 4.43 7.6 83 22 50 32.1      A. Elbow 9.27 1.82 7.2 78.1 10 55 32.1       Sensory NCS      Nerve / Sites Onset Lat Peak Lat PP Amp Distance Velocity Temp.    ms ms µV cm m/s °C   R Median - Digit II (Antidromic)      Mid Palm NR NR NR 7 NR 32.2      Wrist NR NR NR 14 NR 32.2   L Median - Digit II (Antidromic)      Mid Palm NR NR NR 7 NR 32      Wrist NR NR NR 14 NR 32   R Ulnar - Digit V (Antidromic)      Wrist 3.02 3.80 7.6 14 46 32.2   R Radial - Anatomical snuff box (Forearm)      Forearm 2.08 2.97 6.3 10 48 32.2       F  Wave      Nerve F Lat M Lat F-M Lat ms ms ms   R Ulnar - ADM 31.1 2.5 28.6   L Median - APB 49.4 11.0 38.4       EMG         EMG Summary Table     Spontaneous MUAP Recruitment   Muscle IA Fib PSW Fasc H.F. Amp Dur. PPP Pattern   R. Deltoid N None None None None N N N N   R. Biceps brachii N None None None None N N N N   R. Triceps brachii N None None None None N N N N   R. Pronator teres N None None None None N N N N   R. First dorsal interosseous N None None None None N N N N   R. Abductor pollicis brevis Incr 3+ 3+ None None    Distant MUPs   R. Cervical paraspinals (mid) N None 1+ None None N N N N   R. Cervical paraspinals (low) N None None None None N N N N   L. Abductor pollicis brevis Incr 1+ 3+ None None 2+ 1+ N Decr        Summary of Findings:   Nerve conduction studies: The following nerve conduction studies were abnormal:   ***  All other nerve conduction studies listed in the table above were normal in latency, amplitude and conduction velocity. Needle EMG:   Needle EMG was performed using a *** needle. The following abnormalities were seen on needle EMG: ***  All other muscles tested, as listed in the table above demonstrated normal amplitude, duration, phases and recruitment and no active denervation signs were seen. Diagnostic Interpretation: This study was {Normal/Abnormal:3651295791}. Electrodiagnosis: There is electrodiagnostic evidence of a***. Location: {RIGHT/LEFT/EDWIGE:20308} {SYMMETRIC/ASYMMETRIC:746393401}; {Desc; prox/mid/distal:94770}, at the ***. Nature: [  ] Axonal   [  ] Demyelinating  [  ] Mixed axonal and demyelinating     [  ] Sensory [  ] Motor               [  ] Mixed sensorimotor     [  ] with active denervation       [  ] without active denervation  Duration: {Desc; acute/subacute/chronic:99821}  Severity: {MILD/MODERATE/SEVERE:21011}  Prognosis: {Prognosis:05704}    Previous Study: There {IS/IS LYP:16746} a prior study for comparison. Date: ***. Provider: ***.  Compared to prior study, today's study showed***. Follow up EMG is recommended if ***. Technologist: ***  Physician:***    Nerve conduction studies and electromyography were performed according to our laboratory policies and procedures which can be provided upon request. All abnormal values are identified in the table.  Laboratory normal values can also be provided upon request.       Cc: DO Arianna Piña DO

## 2023-02-24 ENCOUNTER — TELEPHONE (OUTPATIENT)
Dept: FAMILY MEDICINE CLINIC | Age: 73
End: 2023-02-24

## 2023-02-24 NOTE — TELEPHONE ENCOUNTER
Spoke with Shazia Melvin, & let him know you said we were OK to cancel appt. He is agreeable to that, & will await your call with EMG results.

## 2023-02-24 NOTE — TELEPHONE ENCOUNTER
----- Message from Sachin Schilling sent at 2/24/2023 12:34 PM EST -----  Subject: Appointment Request    Reason for Call: Established Patient Appointment needed: Routine Existing   Condition Follow Up    QUESTIONS    Reason for appointment request? Available appointments did not meet   patient need     Additional Information for Provider? Patient needs follow up for recent   procedure, Tues the 14th. Scheduled him for 03/31, but hoping to be seen   sooner .  Please call   ---------------------------------------------------------------------------  --------------  Juana Bruner INFO  [phone]; OK to leave message on voicemail  ---------------------------------------------------------------------------  --------------  SCRIPT ANSWERS  ROBB Screen: Kristi Shaw

## 2023-02-24 NOTE — TELEPHONE ENCOUNTER
Yes, the procedure was the EMG. I told him we would call with those results. Would you like him to keep the appt that he had originally scheduled to review the results, or are you OK with that being cancelled? I let him know I would give him a call once we discussed with you.

## 2023-03-21 DIAGNOSIS — F34.1 DYSTHYMIA: ICD-10-CM

## 2023-03-21 RX ORDER — QUETIAPINE FUMARATE 25 MG/1
TABLET, FILM COATED ORAL
Qty: 90 TABLET | Refills: 1 | Status: SHIPPED | OUTPATIENT
Start: 2023-03-21

## 2023-03-27 ENCOUNTER — PROCEDURE VISIT (OUTPATIENT)
Dept: PODIATRY | Age: 73
End: 2023-03-27
Payer: MEDICARE

## 2023-03-27 VITALS
WEIGHT: 228 LBS | TEMPERATURE: 98.2 F | SYSTOLIC BLOOD PRESSURE: 130 MMHG | DIASTOLIC BLOOD PRESSURE: 79 MMHG | BODY MASS INDEX: 31.8 KG/M2

## 2023-03-27 DIAGNOSIS — M79.675 PAIN IN LEFT TOE(S): ICD-10-CM

## 2023-03-27 DIAGNOSIS — M79.674 PAIN IN TOE OF RIGHT FOOT: ICD-10-CM

## 2023-03-27 DIAGNOSIS — L30.9 DERMATITIS: Primary | ICD-10-CM

## 2023-03-27 DIAGNOSIS — Z79.4 TYPE 2 DIABETES MELLITUS WITHOUT COMPLICATION, WITH LONG-TERM CURRENT USE OF INSULIN (HCC): ICD-10-CM

## 2023-03-27 DIAGNOSIS — E11.9 TYPE 2 DIABETES MELLITUS WITHOUT COMPLICATION, WITH LONG-TERM CURRENT USE OF INSULIN (HCC): ICD-10-CM

## 2023-03-27 DIAGNOSIS — I73.9 PERIPHERAL VASCULAR DISEASE, UNSPECIFIED (HCC): ICD-10-CM

## 2023-03-27 DIAGNOSIS — B35.1 TINEA UNGUIUM: ICD-10-CM

## 2023-03-27 PROCEDURE — 1123F ACP DISCUSS/DSCN MKR DOCD: CPT | Performed by: PODIATRIST

## 2023-03-27 PROCEDURE — 11721 DEBRIDE NAIL 6 OR MORE: CPT | Performed by: PODIATRIST

## 2023-03-27 PROCEDURE — 3075F SYST BP GE 130 - 139MM HG: CPT | Performed by: PODIATRIST

## 2023-03-27 PROCEDURE — 3078F DIAST BP <80 MM HG: CPT | Performed by: PODIATRIST

## 2023-03-27 PROCEDURE — 99213 OFFICE O/P EST LOW 20 MIN: CPT | Performed by: PODIATRIST

## 2023-03-27 RX ORDER — DULAGLUTIDE 4.5 MG/.5ML
INJECTION, SOLUTION SUBCUTANEOUS
Qty: 4 ML | Refills: 5 | Status: SHIPPED | OUTPATIENT
Start: 2023-03-27

## 2023-03-27 RX ORDER — CLOBETASOL PROPIONATE 0.5 MG/G
OINTMENT TOPICAL
Qty: 60 G | Refills: 1 | Status: SHIPPED | OUTPATIENT
Start: 2023-03-27

## 2023-03-27 ASSESSMENT — ENCOUNTER SYMPTOMS
BACK PAIN: 1
COLOR CHANGE: 1

## 2023-03-27 NOTE — PROGRESS NOTES
(NORVASC) 10 MG tablet TAKE 1 TABLET BY MOUTH ONCE DAILY 90 tablet 1    Insulin Pen Needle (B-D UF III MINI PEN NEEDLES) 31G X 5 MM MISC USE AS DIRECTED FOR  INSULIN  ADMINISTRATION 300 each 5    empagliflozin (JARDIANCE) 25 MG tablet Take 1 tablet by mouth once daily with breakfast 90 tablet 1    pantoprazole (PROTONIX) 40 MG tablet One by mouth daily 90 tablet 1    pregabalin (LYRICA) 150 MG capsule Take 1 capsule by mouth twice daily 180 capsule 1    atorvastatin (LIPITOR) 40 MG tablet Take 1 tablet by mouth once daily 90 tablet 1    TOUJEO SOLOSTAR 300 UNIT/ML SOPN INJECT 60 UNITS SUBCUTANEOUSLY ONCE DAILY      triamcinolone (ARISTOCORT) 0.5 % cream Apply topically 3 times daily. 15 g 1    metoprolol succinate (TOPROL XL) 25 MG extended release tablet Take 1 tablet by mouth once daily 90 tablet 1    pioglitazone (ACTOS) 30 MG tablet       hydroCHLOROthiazide (HYDRODIURIL) 25 MG tablet Take 25 mg by mouth daily      ZINC PO Take by mouth      Continuous Blood Gluc  (FREESTYLE [place] 14 DAY READER) JOSSE Use as directed to monitor blood glucose 1 Device 1    aspirin 81 MG chewable tablet Take 81 mg by mouth daily      [DISCONTINUED] Continuous Blood Gluc Sensor (FREESTYLE [place] 14 DAY SENSOR) MISC Use as directed to monitor blood glucose 6 each 1     No current facility-administered medications on file prior to visit. Review of Systems   Constitutional: Negative. HENT: Negative. Endocrine: Negative. Musculoskeletal:  Positive for arthralgias, back pain and gait problem. Skin:  Positive for color change and rash. Review of Systems:   History obtained from chart review and the patient  General ROS: negative for - chills, fatigue, fever, night sweats or weight gain  Constitutional: Negative for chills, diaphoresis, fatigue, fever and unexpected weight change. Musculoskeletal: Positive for arthralgias, gait problem and joint swelling.   Neurological ROS: negative for - behavioral changes,

## 2023-03-31 ENCOUNTER — OFFICE VISIT (OUTPATIENT)
Dept: FAMILY MEDICINE CLINIC | Age: 73
End: 2023-03-31

## 2023-03-31 VITALS
DIASTOLIC BLOOD PRESSURE: 80 MMHG | HEART RATE: 66 BPM | TEMPERATURE: 97.3 F | OXYGEN SATURATION: 97 % | SYSTOLIC BLOOD PRESSURE: 120 MMHG

## 2023-03-31 DIAGNOSIS — R20.2 NUMBNESS AND TINGLING IN RIGHT HAND: Primary | ICD-10-CM

## 2023-03-31 DIAGNOSIS — Z02.89 ENCOUNTER TO OBTAIN EXCUSE FROM WORK: ICD-10-CM

## 2023-03-31 DIAGNOSIS — R20.0 NUMBNESS AND TINGLING IN RIGHT HAND: Primary | ICD-10-CM

## 2023-03-31 NOTE — PROGRESS NOTES
Chief Complaint       Hand Pain      History of Present Illness   Source of history provided by: patient. Meliton Smalls is a 74 y.o. old male presenting to the walk in clinic to obtain a work excuse. Patient was originally scheduled to see his PCP today (Dr. Bobby Parry) but this appointment was changed. Verbal confirmation was obtained by patient via phone but he does not recall this conversation. He subsequently presented and needs a work excuse as he had to take off work today. He was coming to discuss his EMG results. Patient has been having ongoing tingling and numbness in his right hand for quite some time now. Past medical history is also significant for type 2 diabetes and polyneuropathy. Denies any swelling, bruising, obvious deformity, finger pain, elbow pain, weakness, fever, chills, N/V, or abrasions. ROS    Unless otherwise stated in this report or unable to obtain because of the patient's clinical or mental status as evidenced by the medical record, this patients's positive and negative responses for Review of Systems, constitutional, psych, eyes, ENT, cardiovascular, respiratory, gastrointestinal, neurological, genitourinary, musculoskeletal, integument systems and systems related to the presenting problem are either stated in the preceding or were not pertinent or were negative for the symptoms and/or complaints related to the medical problem. Past Medical History:  has a past medical history of CAD (coronary artery disease), Diabetes mellitus ([address] GERD (gastroesophageal reflux disease), Hyperlipidemia, Hypertension, Obesity, and Stage 3b chronic kidney disease ([address] Past Surgical History:  has a past surgical history that includes Colonoscopy; Endoscopy, colon, diagnostic; Coronary artery bypass graft (11/23/2016); and Skin cancer excision (08/22/2017). Social History:  reports that he quit smoking about 25 years ago. His smoking use included cigarettes.  He started smoking about

## 2023-03-31 NOTE — LETTER
March 31, 2023       Elijah Hernandez YOB: 1950   [address] Date of Visit:  3/31/2023       To Whom It May Concern:     Elijah Hernandez was seen in my office today. Please excuse him from missing work today (3/31/23). If you have any questions or concerns, please don't hesitate to call. Sincerely,        Calvin Edward.  MATT Contreras

## 2023-04-07 ENCOUNTER — OFFICE VISIT (OUTPATIENT)
Dept: FAMILY MEDICINE CLINIC | Age: 73
End: 2023-04-07

## 2023-04-07 VITALS
WEIGHT: 226 LBS | TEMPERATURE: 98.5 F | DIASTOLIC BLOOD PRESSURE: 68 MMHG | SYSTOLIC BLOOD PRESSURE: 122 MMHG | HEART RATE: 78 BPM | RESPIRATION RATE: 18 BRPM | BODY MASS INDEX: 31.64 KG/M2 | OXYGEN SATURATION: 100 % | HEIGHT: 71 IN

## 2023-04-07 DIAGNOSIS — G56.03 CARPAL TUNNEL SYNDROME ON BOTH SIDES: Primary | ICD-10-CM

## 2023-04-07 DIAGNOSIS — M54.12 CERVICAL RADICULOPATHY: ICD-10-CM

## 2023-04-07 RX ORDER — ATORVASTATIN CALCIUM 80 MG/1
80 TABLET, FILM COATED ORAL DAILY
COMMUNITY
Start: 2023-03-03

## 2023-04-07 ASSESSMENT — ENCOUNTER SYMPTOMS
ABDOMINAL PAIN: 0
DIARRHEA: 0
COUGH: 0
SHORTNESS OF BREATH: 0
WHEEZING: 0
CONSTIPATION: 0
ROS SKIN COMMENTS: MASS ON BACK
VOMITING: 0
BACK PAIN: 0
NAUSEA: 0

## 2023-04-07 NOTE — PROGRESS NOTES
tablet TAKE 1 TABLET BY MOUTH ONCE DAILY 90 tablet 1    Insulin Pen Needle (B-D UF III MINI PEN NEEDLES) 31G X 5 MM MISC USE AS DIRECTED FOR  INSULIN  ADMINISTRATION 300 each 5    empagliflozin (JARDIANCE) 25 MG tablet Take 1 tablet by mouth once daily with breakfast 90 tablet 1    pantoprazole (PROTONIX) 40 MG tablet One by mouth daily 90 tablet 1    pregabalin (LYRICA) 150 MG capsule Take 1 capsule by mouth twice daily 180 capsule 1    TOUJEO SOLOSTAR 300 UNIT/ML SOPN INJECT 60 UNITS SUBCUTANEOUSLY ONCE DAILY      triamcinolone (ARISTOCORT) 0.5 % cream Apply topically 3 times daily. 15 g 1    metoprolol succinate (TOPROL XL) 25 MG extended release tablet Take 1 tablet by mouth once daily 90 tablet 1    pioglitazone (ACTOS) 30 MG tablet       hydroCHLOROthiazide (HYDRODIURIL) 25 MG tablet Take 1 tablet by mouth daily      ZINC PO Take by mouth      Continuous Blood Gluc  (FREESTYLE [place] 14 DAY READER) JOSSE Use as directed to monitor blood glucose 1 Device 1    [DISCONTINUED] Continuous Blood Gluc Sensor (FREESTYLE [place] 14 DAY SENSOR) MISC Use as directed to monitor blood glucose 6 each 1    aspirin 81 MG chewable tablet Take 1 tablet by mouth daily      atorvastatin (LIPITOR) 80 MG tablet Take 1 tablet by mouth daily       No current facility-administered medications on file prior to visit. Allergies   Allergen Reactions    Clindamycin Hcl      C. Diff.       Cymbalta [Duloxetine Hcl]     Duloxetine      Other reaction(s): Impending doom     Lactose Intolerance (Gi)     Metformin        Past Medical History:   Diagnosis Date    CAD (coronary artery disease)     Diabetes mellitus ([facility] [address]     GERD (gastroesophageal reflux disease) 11/18/2016    Hyperlipidemia     Hypertension     Obesity     Stage 3b chronic kidney disease ([facility] [address]      Past Surgical History:   Procedure Laterality Date    COLONOSCOPY      CORONARY ARTERY BYPASS GRAFT  11/23/2016    x4    ENDOSCOPY, COLON, DIAGNOSTIC      SKIN CANCER

## 2023-08-24 DIAGNOSIS — Z79.4 TYPE 2 DIABETES MELLITUS WITH DIABETIC POLYNEUROPATHY, WITH LONG-TERM CURRENT USE OF INSULIN (HCC): ICD-10-CM

## 2023-08-24 DIAGNOSIS — K21.9 GASTROESOPHAGEAL REFLUX DISEASE WITHOUT ESOPHAGITIS: ICD-10-CM

## 2023-08-24 DIAGNOSIS — E11.42 TYPE 2 DIABETES MELLITUS WITH DIABETIC POLYNEUROPATHY, WITH LONG-TERM CURRENT USE OF INSULIN (HCC): ICD-10-CM

## 2023-08-28 RX ORDER — PANTOPRAZOLE SODIUM 40 MG/1
TABLET, DELAYED RELEASE ORAL
Qty: 60 TABLET | Refills: 0 | Status: SHIPPED | OUTPATIENT
Start: 2023-08-28

## 2023-08-28 RX ORDER — PREGABALIN 150 MG/1
CAPSULE ORAL
Qty: 120 CAPSULE | Refills: 0 | Status: SHIPPED | OUTPATIENT
Start: 2023-08-28 | End: 2023-10-28

## 2023-10-03 DIAGNOSIS — E11.42 TYPE 2 DIABETES MELLITUS WITH DIABETIC POLYNEUROPATHY, WITH LONG-TERM CURRENT USE OF INSULIN (HCC): ICD-10-CM

## 2023-10-03 DIAGNOSIS — Z79.4 TYPE 2 DIABETES MELLITUS WITH DIABETIC POLYNEUROPATHY, WITH LONG-TERM CURRENT USE OF INSULIN (HCC): ICD-10-CM

## 2023-10-03 DIAGNOSIS — F34.1 DYSTHYMIA: ICD-10-CM

## 2023-10-03 DIAGNOSIS — K21.9 GASTROESOPHAGEAL REFLUX DISEASE WITHOUT ESOPHAGITIS: ICD-10-CM

## 2023-10-04 RX ORDER — PANTOPRAZOLE SODIUM 40 MG/1
TABLET, DELAYED RELEASE ORAL
Qty: 90 TABLET | Refills: 1 | Status: SHIPPED | OUTPATIENT
Start: 2023-10-04

## 2023-10-04 RX ORDER — PREGABALIN 150 MG/1
CAPSULE ORAL
Qty: 60 CAPSULE | Refills: 0 | Status: SHIPPED | OUTPATIENT
Start: 2023-10-04 | End: 2023-11-04

## 2023-10-04 RX ORDER — QUETIAPINE FUMARATE 25 MG/1
TABLET, FILM COATED ORAL
Qty: 90 TABLET | Refills: 1 | Status: SHIPPED | OUTPATIENT
Start: 2023-10-04

## 2023-10-04 NOTE — TELEPHONE ENCOUNTER
Lyrica is a controlled medication. He has to have an appointment for refills.   I sent 30 days to get him through, but no further refills without appt

## 2023-11-06 ENCOUNTER — EVALUATION (OUTPATIENT)
Dept: OCCUPATIONAL THERAPY | Age: 73
End: 2023-11-06
Payer: MEDICARE

## 2023-11-06 DIAGNOSIS — G56.03 CARPAL TUNNEL SYNDROME, BILATERAL UPPER LIMBS: Primary | ICD-10-CM

## 2023-11-06 PROCEDURE — 97165 OT EVAL LOW COMPLEX 30 MIN: CPT

## 2023-11-06 NOTE — PROGRESS NOTES
Electronically signed by: Katya Schwartz, OT R/L #[id number], CKTP     Physician's Certification / Comments      Frequency/Duration 1-2x / week for 16 visits. Certification period From: 11/06/2023  To: 03/06/2024     I have reviewed the Plan of Care established for skilled therapy services and certify that the services are required and that they will be provided while the patient is under my care. Practitioner's Comments/Revisions:                   Practitioner's Printed Name:  Dr. Apple Arevalo, DO                                   Practitioner's Signature:                                                               Date:      Please review Patient's OT evaluation and if you agree sign/date and fax back to us at our [address] [place] [address] OT Fax: [phone].  Thank you for your referral!

## 2023-11-21 ENCOUNTER — TREATMENT (OUTPATIENT)
Dept: OCCUPATIONAL THERAPY | Age: 73
End: 2023-11-21

## 2023-11-21 DIAGNOSIS — G56.03 CARPAL TUNNEL SYNDROME, BILATERAL UPPER LIMBS: Primary | ICD-10-CM

## 2023-11-21 NOTE — PROGRESS NOTES
reporting noncompliance with HEP due to recent loss, and stiffness throughout digits. Digits do not display edema of R hand digits. Fluctuation of edema throughout islas/dorsal hand displayed as shown by measurements below. This was addressed by MEM and tendon glides were also incorporated. HEP reviewed to include prayer stretch and thumb MP/IP flexion with blocking added. Putty issued for self-carpal tunnel stretch also added to HEP. Pt demonstrated min to no difficulty with exercises. Pt noted relief with this stretch. Increased mobility noted by end of session. Continue to progress as tolerated 1x/week with focus on building HEP. Edema Description/Circumferential Measurements:              Figure 8 measurement--              R: 49.0 cm (likely due to muscle wasting)              L: 48.0 cm    -Rehab Potential: Good   -Patient Response to Treatment: Pt verbalizes that EDWIGE Ue's feel increasingly flexible at end of session. Patient. Education:  [x] Plans/Goals, Risks/Benefits discussed  [x] Home exercise program  Method of Education: [x] Verbal  [x] Demo  [] Written  Comprehension of Education:  [x] Verbalizes understanding. [x] Demonstrates understanding. [] Needs Review. [] Demonstrates/verbalizes understanding of HEP/Ed previously given.       Time In:1:09 am            Time Out: 2:09 am             CODE  Minutes  Units   58881 Fluidotherapy     18074 Paraffin 10 --   82204 Ultrasound     76998 Electrical Stim - Attended     72397 Iontophoresis     86645 Therapeutic Ex 25 2   75127 Therapeutic Activity 10 1   28938 Neuromuscular Re-Ed     38892 Manual Therapy 15 1   66356 ADL/COMP Tech Train     22648 Orthotic Management/Training      Other                 Total  60 4       Plan: OT 1-2x/week for 16 sessions    [x]  Continues Plan of care with focus on to reduce pain/paresthesias of EDWIGE Ue's, improve affected ROM, increase fine motor control, strengthen, and issue home exercise and splinting program as

## 2023-11-29 DIAGNOSIS — E11.42 TYPE 2 DIABETES MELLITUS WITH DIABETIC POLYNEUROPATHY, WITH LONG-TERM CURRENT USE OF INSULIN (HCC): ICD-10-CM

## 2023-11-29 DIAGNOSIS — Z79.4 TYPE 2 DIABETES MELLITUS WITH DIABETIC POLYNEUROPATHY, WITH LONG-TERM CURRENT USE OF INSULIN (HCC): ICD-10-CM

## 2023-11-30 RX ORDER — PREGABALIN 150 MG/1
CAPSULE ORAL
Qty: 60 CAPSULE | Refills: 0 | OUTPATIENT
Start: 2023-11-30

## 2023-12-01 ENCOUNTER — TREATMENT (OUTPATIENT)
Dept: OCCUPATIONAL THERAPY | Age: 73
End: 2023-12-01
Payer: MEDICARE

## 2023-12-01 DIAGNOSIS — G56.03 CARPAL TUNNEL SYNDROME, BILATERAL UPPER LIMBS: Primary | ICD-10-CM

## 2023-12-01 PROCEDURE — 97110 THERAPEUTIC EXERCISES: CPT

## 2023-12-01 PROCEDURE — 97530 THERAPEUTIC ACTIVITIES: CPT

## 2023-12-01 PROCEDURE — 97018 PARAFFIN BATH THERAPY: CPT

## 2023-12-01 NOTE — PROGRESS NOTES
Other:                 Assessment/Comments: Pt reports continued limited compliance with HEP. Pt advised to let clinic know ASAP of possible change of insurance. Pt had good tolerance for increased reps with jux-a-ciser. Pt demonstrated difficulty with fine motor activities due to limited sensation in digits. Moderate difficulty noted. Pt encouraged to comply with HEP, especially that he has found initially lost resistive putty for carpal tunnel stretch. Next session, progress dexterity as tolerated and consider initiation of strengthening. Edema Description/Circumferential Measurements:              Figure 8 measurement--              R: 49.0 cm (likely due to muscle wasting)              L: 48.0 cm    -Rehab Potential: Good   -Patient Response to Treatment: Pt felt challenged by small peg activity, especially. Patient. Education:  [x] Plans/Goals, Risks/Benefits discussed  [x] Home exercise program  Method of Education: [x] Verbal  [x] Demo  [] Written  Comprehension of Education:  [x] Verbalizes understanding. [x] Demonstrates understanding. [] Needs Review. [] Demonstrates/verbalizes understanding of HEP/Ed previously given.       Time In:10:07          Time Out: 11:00 am          CODE  Minutes  Units   11432 Fluidotherapy     39817 Paraffin 10 1   61327 Ultrasound     10466 Electrical Stim - Attended     85667 Iontophoresis     27241 Therapeutic Ex 28 2   63089 Therapeutic Activity 15 1   12863 Neuromuscular Re-Ed     06201 Manual Therapy     65787 ADL/COMP Tech Train     53031 Orthotic Management/Training      Other                 Total  53 4       Plan: OT 1-2x/week for 16 sessions    [x]  Continues Plan of care with focus on to reduce pain/paresthesias of EDWIGE Ue's, improve affected ROM, increase fine motor control, strengthen, and issue home exercise and splinting program as needed to increase functional use of EDWIGE Ue's: Treatment covered based on POC and graduated to patient's

## 2023-12-05 DIAGNOSIS — E11.42 TYPE 2 DIABETES MELLITUS WITH DIABETIC POLYNEUROPATHY, WITH LONG-TERM CURRENT USE OF INSULIN (HCC): ICD-10-CM

## 2023-12-05 DIAGNOSIS — Z79.4 TYPE 2 DIABETES MELLITUS WITH DIABETIC POLYNEUROPATHY, WITH LONG-TERM CURRENT USE OF INSULIN (HCC): ICD-10-CM

## 2023-12-05 RX ORDER — PREGABALIN 150 MG/1
CAPSULE ORAL
Qty: 180 CAPSULE | Refills: 0 | OUTPATIENT
Start: 2023-12-05

## 2023-12-14 ENCOUNTER — TREATMENT (OUTPATIENT)
Dept: OCCUPATIONAL THERAPY | Age: 73
End: 2023-12-14

## 2023-12-14 DIAGNOSIS — G56.03 CARPAL TUNNEL SYNDROME, BILATERAL UPPER LIMBS: Primary | ICD-10-CM

## 2023-12-14 NOTE — PROGRESS NOTES
Management/Training      Other                 Total  53 4       Plan: OT 1-2x/week for 16 sessions    [x]  Continues Plan of care with focus on to reduce pain/paresthesias of EDWIGE Ue's, improve affected ROM, increase fine motor control, strengthen, and issue home exercise and splinting program as needed to increase functional use of EDWIGE Ue's: Treatment covered based on POC and graduated to patient's progress. Pt education continues at each visit to obtain maximum benefits from skilled OT intervention.   []  Alter Plan of care:   []  Discharge:    Jose Ferrara, OT R/L, [id number] [facility], #[id number]

## 2023-12-28 ENCOUNTER — TREATMENT (OUTPATIENT)
Dept: OCCUPATIONAL THERAPY | Age: 73
End: 2023-12-28
Payer: MEDICARE

## 2023-12-28 DIAGNOSIS — G56.03 CARPAL TUNNEL SYNDROME, BILATERAL UPPER LIMBS: Primary | ICD-10-CM

## 2023-12-28 PROCEDURE — 97110 THERAPEUTIC EXERCISES: CPT

## 2023-12-28 PROCEDURE — 97140 MANUAL THERAPY 1/> REGIONS: CPT

## 2023-12-28 PROCEDURE — 97530 THERAPEUTIC ACTIVITIES: CPT

## 2023-12-28 NOTE — PROGRESS NOTES
OCCUPATIONAL THERAPY DAILY NOTE    PROGRESS UPDATE    [id number]  [place] [address]  Dept: [phone]  Loc: [phone]   [facility] OT Fax: [phone]      Date:  2023  Initial Evaluation Date: 2023   Evaluating Therapist: Manny Denny OT    Patient Name:  Elle Sol    :  1950    Restrictions/Precautions:  As per protocol for carpal tunnel release (R UE), as per protocol for carpal tunnel syndrome - non-surgical management, CKD, DM, CAD, HTN, moderate fall risk (3 falls in last year)  Diagnosis:  Carpal Tunnel Sydrome, EDWIGE upper limbs, G56.03 (ICD-10-CM)                                                           Date of Surgery/Injury: 2023 sx (4 months)     Insurance/Certification information:  [employer] - REF#[id number] approved for 7 visits for CPT codes requested: [id number], [id number], [id number], [id number], [id number], [id number], 82388 until 2024     Plan of care signed (Y/N): Y - cosigned  Visit# / total visits:      Referring Practitioner:  Dr. Jessica Shahid, DO - NPI #[id number]  Specific Practitioner Orders: OT Evaluate and Treat -      S/P R carpal tunnel release 3 months ago     PROM, AROM, AAROM, Stretching, Scar Management,  Strengthening, and Modalities as needed.      Frequency 2-3/week for 6-8 weeks or per therapist discretion     Assessment of current deficits   [] Functional mobility             [x] ADLs           [x] Strength                  [] Cognition   [] Functional transfers           [x] IADLs          [] Safety Awareness  [x] Endurance   [x] Fine Motor Coordination    [] Balance      [] Vision/perception    [x] Sensation     [] Gross Motor Coordination [x] ROM           [x] Pain                        [] Edema          [x] Scar Adhesion/Skin Integrity      OT PLAN OF CARE   OT POC based on physician orders, patient diagnosis and results of clinical assessment     Frequency/Duration 1-2x

## 2024-01-04 ENCOUNTER — TREATMENT (OUTPATIENT)
Dept: OCCUPATIONAL THERAPY | Age: 74
End: 2024-01-04
Payer: MEDICARE

## 2024-01-04 DIAGNOSIS — G56.03 CARPAL TUNNEL SYNDROME, BILATERAL UPPER LIMBS: Primary | ICD-10-CM

## 2024-01-04 PROCEDURE — 97110 THERAPEUTIC EXERCISES: CPT | Performed by: OCCUPATIONAL THERAPIST

## 2024-01-04 NOTE — PROGRESS NOTES
OCCUPATIONAL THERAPY DAILY NOTE/DISCHARGE SUMMARY  [facility]  [address] [facility] [address]  Dept: [phone]  Loc: [phone]   [facility] OT Fax: [phone]      Date:  2024  Initial Evaluation Date: 2023   Evaluating Therapist: Leanne Manjarrez OT    Patient Name:  Eben Alfaro    :  1950    Restrictions/Precautions:  As per protocol for carpal tunnel release (R UE), as per protocol for carpal tunnel syndrome - non-surgical management, CKD, DM, CAD, HTN, moderate fall risk (3 falls in last year)  Diagnosis:  Carpal Tunnel Sydrome, EDWIGE upper limbs, G56.03 (ICD-10-CM)                                                           Date of Surgery/Injury: 2023 sx (4 months)     Insurance/Certification information:  [facility] Medicare - REF#[id number] approved for 7 visits for CPT codes requested: 15446, 74562, 36118, 72565, 50330, 54050, 43088 until 2024     Plan of care signed (Y/N): Y - cosigned  Visit# / total visits:      Referring Practitioner:  Dr. Albert Navarro, DO - NPI #[id number]  Specific Practitioner Orders: OT Evaluate and Treat -      S/P R carpal tunnel release 3 months ago     PROM, AROM, AAROM, Stretching, Scar Management,  Strengthening, and Modalities as needed.     Frequency 2-3/week for 6-8 weeks or per therapist discretion     Assessment of current deficits   [] Functional mobility             [x] ADLs           [x] Strength                  [] Cognition   [] Functional transfers           [x] IADLs          [] Safety Awareness  [x] Endurance   [x] Fine Motor Coordination    [] Balance      [] Vision/perception    [x] Sensation     [] Gross Motor Coordination [x] ROM           [x] Pain                        [] Edema          [x] Scar Adhesion/Skin Integrity      OT PLAN OF CARE   OT POC based on physician orders, patient diagnosis and results of clinical assessment     Frequency/Duration 1-2x

## 2024-05-02 PROBLEM — G56.00 CARPAL TUNNEL SYNDROME: Status: ACTIVE | Noted: 2024-05-02

## 2024-05-16 ENCOUNTER — OFFICE VISIT (OUTPATIENT)
Dept: FAMILY MEDICINE CLINIC | Age: 74
End: 2024-05-16
Payer: MEDICARE

## 2024-05-16 VITALS
BODY MASS INDEX: 30.66 KG/M2 | SYSTOLIC BLOOD PRESSURE: 124 MMHG | DIASTOLIC BLOOD PRESSURE: 64 MMHG | HEART RATE: 76 BPM | WEIGHT: 219 LBS | HEIGHT: 71 IN | OXYGEN SATURATION: 97 % | TEMPERATURE: 98 F | RESPIRATION RATE: 18 BRPM

## 2024-05-16 DIAGNOSIS — R09.81 SINUS CONGESTION: ICD-10-CM

## 2024-05-16 DIAGNOSIS — J01.90 ACUTE NON-RECURRENT SINUSITIS, UNSPECIFIED LOCATION: Primary | ICD-10-CM

## 2024-05-16 DIAGNOSIS — H10.33 ACUTE CONJUNCTIVITIS OF BOTH EYES, UNSPECIFIED ACUTE CONJUNCTIVITIS TYPE: ICD-10-CM

## 2024-05-16 DIAGNOSIS — J02.9 SORE THROAT: ICD-10-CM

## 2024-05-16 LAB
Lab: NORMAL
PERFORMING INSTRUMENT: NORMAL
QC PASS/FAIL: NORMAL
S PYO AG THROAT QL: NORMAL
SARS-COV-2, POC: NORMAL

## 2024-05-16 PROCEDURE — 87880 STREP A ASSAY W/OPTIC: CPT | Performed by: NURSE PRACTITIONER

## 2024-05-16 PROCEDURE — 3078F DIAST BP <80 MM HG: CPT | Performed by: NURSE PRACTITIONER

## 2024-05-16 PROCEDURE — 1123F ACP DISCUSS/DSCN MKR DOCD: CPT | Performed by: NURSE PRACTITIONER

## 2024-05-16 PROCEDURE — 87426 SARSCOV CORONAVIRUS AG IA: CPT | Performed by: NURSE PRACTITIONER

## 2024-05-16 PROCEDURE — 3074F SYST BP LT 130 MM HG: CPT | Performed by: NURSE PRACTITIONER

## 2024-05-16 PROCEDURE — 99213 OFFICE O/P EST LOW 20 MIN: CPT | Performed by: NURSE PRACTITIONER

## 2024-05-16 RX ORDER — AMOXICILLIN 875 MG/1
875 TABLET, COATED ORAL 2 TIMES DAILY
Qty: 20 TABLET | Refills: 0 | Status: SHIPPED | OUTPATIENT
Start: 2024-05-16 | End: 2024-05-26

## 2024-05-16 NOTE — PROGRESS NOTES
24  Eben Alfaro : 1950 Sex: male  Age 74 y.o.    Subjective:  Chief Complaint   Patient presents with    Congestion       HPI:   Eben Alfaro , 74 y.o. male presents to the clinic for evaluation of sinus congestion x 4 days. The patient also reports sore throat, mild cough. The patient reports being seen at [facility] for symptoms and treated for conjunctivitis (improving). The patient has taken Coricidin for symptoms. The patient reports unchanged symptoms over time. The patient denies known ill exposure. The patient denies headache, rash, and fever. The patient also denies chest pain, abdominal pain, shortness of breath, wheezing, and nausea / vomiting / diarrhea.    ROS:   Unless otherwise stated in this report the patient's positive and negative responses for review of systems for constitutional, eyes, ENT, cardiovascular, respiratory, gastrointestinal, neurological, , musculoskeletal, and integument systems and related systems to the presenting problem are either stated in the history of present illness or were not pertinent or were negative for the symptoms and/or complaints related to the presenting medical problem.  Positives and pertinent negatives as per HPI.  All others reviewed and are negative.      PMH:     Past Medical History:   Diagnosis Date    CAD (coronary artery disease)     Diabetes mellitus (HCC)     GERD (gastroesophageal reflux disease) 2016    Hyperlipidemia     Hypertension     Obesity     Stage 3b chronic kidney disease (HCC)        Past Surgical History:   Procedure Laterality Date    COLONOSCOPY      CORONARY ARTERY BYPASS GRAFT  11/23/2016    x4    ENDOSCOPY, COLON, DIAGNOSTIC      SKIN CANCER EXCISION  2017    SCC BERNAT- SCC 2018 LEFT EAR/NOSE/LEFT ARM       Family History   Problem Relation Age of Onset    Alzheimer's Disease Mother     Heart Disease Mother     Diabetes Mother     Heart Disease Father     Diabetes Paternal Grandmother        Medications:

## 2024-07-31 ENCOUNTER — OFFICE VISIT (OUTPATIENT)
Dept: FAMILY MEDICINE CLINIC | Age: 74
End: 2024-07-31
Payer: MEDICARE

## 2024-07-31 VITALS
OXYGEN SATURATION: 94 % | TEMPERATURE: 97.1 F | SYSTOLIC BLOOD PRESSURE: 116 MMHG | DIASTOLIC BLOOD PRESSURE: 60 MMHG | RESPIRATION RATE: 18 BRPM | WEIGHT: 220 LBS | BODY MASS INDEX: 30.8 KG/M2 | HEART RATE: 65 BPM | HEIGHT: 71 IN

## 2024-07-31 DIAGNOSIS — M79.675 PAIN IN LEFT TOE(S): Primary | ICD-10-CM

## 2024-07-31 PROCEDURE — 3074F SYST BP LT 130 MM HG: CPT | Performed by: NURSE PRACTITIONER

## 2024-07-31 PROCEDURE — 1123F ACP DISCUSS/DSCN MKR DOCD: CPT | Performed by: NURSE PRACTITIONER

## 2024-07-31 PROCEDURE — 99213 OFFICE O/P EST LOW 20 MIN: CPT | Performed by: NURSE PRACTITIONER

## 2024-07-31 PROCEDURE — 3078F DIAST BP <80 MM HG: CPT | Performed by: NURSE PRACTITIONER

## 2024-07-31 NOTE — PROGRESS NOTES
Subjective:  Chief Complaint   Patient presents with    Nail Problem       HPI:  Patient reports that he tripped 2 to 3 days ago, injuring the nail on the fourth left toe.  The nail is now that back, he did not have any bleeding.  He does have neuropathy secondary to diabetes, and has had some discomfort in the toe, but is generally unable to tell me if there is pain.  He ambulates normally.  No redness and warmth to the affected area.  No fever or chills.  Patient comes to the urgent care for evaluation.      ROS:  Positive and pertinent negatives as per HPI.  All other systems are reviewed and negative.       Current Outpatient Medications:     pantoprazole (PROTONIX) 40 MG tablet, Take 1 tablet by mouth once daily, Disp: 90 tablet, Rfl: 1    QUEtiapine (SEROQUEL) 25 MG tablet, TAKE 1 TABLET BY MOUTH ONCE DAILY AS NEEDED FOR INSOMNIA, Disp: 90 tablet, Rfl: 1    empagliflozin (JARDIANCE) 25 MG tablet, Take 1 tablet by mouth once daily with breakfast, Disp: 60 tablet, Rfl: 0    atorvastatin (LIPITOR) 80 MG tablet, Take 1 tablet by mouth daily, Disp: , Rfl:     clobetasol (TEMOVATE) 0.05 % ointment, Apply topically 2 times daily., Disp: 60 g, Rfl: 1    TRULICITY 4.5 MG/0.5ML SOPN, INJECT 1  SUBCUTANEOUSLY ONCE A WEEK, Disp: 4 mL, Rfl: 5    losartan (COZAAR) 25 MG tablet, TAKE 1 TABLET BY MOUTH ONCE DAILY, Disp: , Rfl:     sodium bicarbonate 650 MG tablet, TAKE 1 TABLET BY MOUTH TWICE DAILY AS DIRECTED, Disp: , Rfl:     amLODIPine (NORVASC) 10 MG tablet, TAKE 1 TABLET BY MOUTH ONCE DAILY, Disp: 90 tablet, Rfl: 1    Insulin Pen Needle (B-D UF III MINI PEN NEEDLES) 31G X 5 MM MISC, USE AS DIRECTED FOR  INSULIN  ADMINISTRATION, Disp: 300 each, Rfl: 5    TOUJEO SOLOSTAR 300 UNIT/ML SOPN, INJECT 60 UNITS SUBCUTANEOUSLY ONCE DAILY, Disp: , Rfl:     triamcinolone (ARISTOCORT) 0.5 % cream, Apply topically 3 times daily., Disp: 15 g, Rfl: 1    metoprolol succinate (TOPROL XL) 25 MG extended release tablet, Take 1 tablet by

## 2024-09-30 ENCOUNTER — PROCEDURE VISIT (OUTPATIENT)
Dept: PODIATRY | Age: 74
End: 2024-09-30
Payer: MEDICARE

## 2024-09-30 VITALS
BODY MASS INDEX: 30.68 KG/M2 | SYSTOLIC BLOOD PRESSURE: 124 MMHG | WEIGHT: 220 LBS | TEMPERATURE: 97.8 F | DIASTOLIC BLOOD PRESSURE: 80 MMHG

## 2024-09-30 DIAGNOSIS — Z79.4 TYPE 2 DIABETES MELLITUS WITHOUT COMPLICATION, WITH LONG-TERM CURRENT USE OF INSULIN (HCC): Primary | ICD-10-CM

## 2024-09-30 DIAGNOSIS — B35.1 TINEA UNGUIUM: ICD-10-CM

## 2024-09-30 DIAGNOSIS — I73.9 PERIPHERAL VASCULAR DISEASE, UNSPECIFIED (HCC): ICD-10-CM

## 2024-09-30 DIAGNOSIS — E11.9 TYPE 2 DIABETES MELLITUS WITHOUT COMPLICATION, WITH LONG-TERM CURRENT USE OF INSULIN (HCC): Primary | ICD-10-CM

## 2024-09-30 DIAGNOSIS — M79.674 PAIN IN TOE OF RIGHT FOOT: ICD-10-CM

## 2024-09-30 DIAGNOSIS — M79.675 PAIN IN LEFT TOE(S): ICD-10-CM

## 2024-09-30 DIAGNOSIS — R26.2 DIFFICULTY WALKING: ICD-10-CM

## 2024-09-30 PROCEDURE — 11721 DEBRIDE NAIL 6 OR MORE: CPT | Performed by: PODIATRIST

## 2024-09-30 ASSESSMENT — ENCOUNTER SYMPTOMS
BACK PAIN: 1
COLOR CHANGE: 1

## 2024-09-30 NOTE — PROGRESS NOTES
[facility] [place] [facility]  [address] [facility] [address]  Dept: [phone]  Dept Fax: [phone]    DIABETIC NAIL PROGRESS NOTE  Date of patient's visit: 9/30/2024  Patient's Name:  Eben Alfaro YOB: 1950            Patient Care Team:  Breonna Brennan MD as PCP - General (Internal Medicine)  Ashleigh Shearer DO as PCP - Empaneled Provider  Abelardo Dee DPM as Consulting Physician (Podiatry)          Chief Complaint   Patient presents with    Diabetes    Toe Pain     Breonna Brennan MD  9/20/2024       Subjective:   Eben Alfaro comes to clinic for Diabetes and Toe Pain (Breonna Brennan MD/9/20/2024)    he is a diabetic and states that diabetic foot exam .  Pt currently has complaint of thickened, elongated nails that they cannot manage by themselves.   Pt's primary care physician is Breonna Brennan MD   new issue right leg rash  2 weeks itchy       Lab Results   Component Value Date    LABA1C 9.2 (H) 01/04/2022      Complains of numbness in the feet bilat.  Past Medical History:   Diagnosis Date    CAD (coronary artery disease)     Diabetes mellitus (HCC)     GERD (gastroesophageal reflux disease) 11/18/2016    Hyperlipidemia     Hypertension     Obesity     Stage 3b chronic kidney disease (HCC)        Allergies   Allergen Reactions    Clindamycin Phosphate Diarrhea     c.diff    C.Diff.    Metformin Nausea And Vomiting     LACTIC ACIDOSIS    Clindamycin Hcl      C.Diff.      Cymbalta [Duloxetine Hcl]     Duloxetine      Other reaction(s): Impending doom     Lactose Intolerance (Gi)      Current Outpatient Medications on File Prior to Visit   Medication Sig Dispense Refill    pantoprazole (PROTONIX) 40 MG tablet Take 1 tablet by mouth once daily 90 tablet 1    QUEtiapine (SEROQUEL) 25 MG tablet TAKE 1 TABLET BY MOUTH ONCE DAILY AS NEEDED FOR INSOMNIA 90 tablet 1    empagliflozin (JARDIANCE) 25 MG tablet

## 2025-01-27 ENCOUNTER — PROCEDURE VISIT (OUTPATIENT)
Dept: PODIATRY | Age: 75
End: 2025-01-27
Payer: MEDICARE

## 2025-01-27 VITALS
WEIGHT: 220 LBS | TEMPERATURE: 97.9 F | BODY MASS INDEX: 30.68 KG/M2 | DIASTOLIC BLOOD PRESSURE: 77 MMHG | HEART RATE: 85 BPM | SYSTOLIC BLOOD PRESSURE: 132 MMHG

## 2025-01-27 DIAGNOSIS — E11.9 TYPE 2 DIABETES MELLITUS WITHOUT COMPLICATION, WITH LONG-TERM CURRENT USE OF INSULIN (HCC): Primary | ICD-10-CM

## 2025-01-27 DIAGNOSIS — B35.1 TINEA UNGUIUM: ICD-10-CM

## 2025-01-27 DIAGNOSIS — M79.674 PAIN IN TOE OF RIGHT FOOT: ICD-10-CM

## 2025-01-27 DIAGNOSIS — M79.675 PAIN IN LEFT TOE(S): ICD-10-CM

## 2025-01-27 DIAGNOSIS — R26.2 DIFFICULTY WALKING: ICD-10-CM

## 2025-01-27 DIAGNOSIS — I73.9 PERIPHERAL VASCULAR DISEASE, UNSPECIFIED (HCC): ICD-10-CM

## 2025-01-27 DIAGNOSIS — Z79.4 TYPE 2 DIABETES MELLITUS WITHOUT COMPLICATION, WITH LONG-TERM CURRENT USE OF INSULIN (HCC): Primary | ICD-10-CM

## 2025-01-27 PROCEDURE — 11721 DEBRIDE NAIL 6 OR MORE: CPT | Performed by: PODIATRIST

## 2025-01-28 ASSESSMENT — ENCOUNTER SYMPTOMS
BACK PAIN: 1
COLOR CHANGE: 1

## 2025-01-28 NOTE — PROGRESS NOTES
[facility]  [address] [address] [address]  Dept: [phone]  Dept Fax: [phone]    DIABETIC NAIL PROGRESS NOTE  Date of patient's visit: 1/28/2025  Patient's Name:  Eben Alfaro YOB: 1950            Patient Care Team:  Breonna Brennan MD as PCP - General (Internal Medicine)  Abelardo Dee DPM as Consulting Physician (Podiatry)          Chief Complaint   Patient presents with    Toe Pain     Breonna Brennan MD  1/20/25    Diabetes       Subjective:   Eben Alfaro comes to clinic for Toe Pain (Breonna Brennan MD/1/20/25) and Diabetes    he is a diabetic and states that diabetic foot exam .  Pt currently has complaint of thickened, elongated nails that they cannot manage by themselves.   Pt's primary care physician is Breonna Brennan MD        Lab Results   Component Value Date    LABA1C 9.2 (H) 01/04/2022      Complains of numbness in the feet bilat.  Past Medical History:   Diagnosis Date    CAD (coronary artery disease)     Diabetes mellitus (HCC)     GERD (gastroesophageal reflux disease) 11/18/2016    Hyperlipidemia     Hypertension     Obesity     Stage 3b chronic kidney disease (HCC)        Allergies   Allergen Reactions    Clindamycin Phosphate Diarrhea     c.diff    C.Diff.    Metformin Nausea And Vomiting     LACTIC ACIDOSIS    Clindamycin Hcl      C.Diff.      Cymbalta [Duloxetine Hcl]     Duloxetine      Other reaction(s): Impending doom     Lactose Intolerance (Gi)      Current Outpatient Medications on File Prior to Visit   Medication Sig Dispense Refill    pantoprazole (PROTONIX) 40 MG tablet Take 1 tablet by mouth once daily 90 tablet 1    QUEtiapine (SEROQUEL) 25 MG tablet TAKE 1 TABLET BY MOUTH ONCE DAILY AS NEEDED FOR INSOMNIA 90 tablet 1    empagliflozin (JARDIANCE) 25 MG tablet Take 1 tablet by mouth once daily with breakfast 60 tablet 0    atorvastatin (LIPITOR) 80 MG

## 2025-04-07 ENCOUNTER — PROCEDURE VISIT (OUTPATIENT)
Dept: PODIATRY | Age: 75
End: 2025-04-07
Payer: MEDICARE

## 2025-04-07 VITALS
SYSTOLIC BLOOD PRESSURE: 111 MMHG | HEIGHT: 71 IN | HEART RATE: 69 BPM | DIASTOLIC BLOOD PRESSURE: 73 MMHG | BODY MASS INDEX: 30.8 KG/M2 | OXYGEN SATURATION: 100 % | TEMPERATURE: 97.7 F | WEIGHT: 220 LBS

## 2025-04-07 DIAGNOSIS — Z79.4 TYPE 2 DIABETES MELLITUS WITHOUT COMPLICATION, WITH LONG-TERM CURRENT USE OF INSULIN: ICD-10-CM

## 2025-04-07 DIAGNOSIS — M79.674 PAIN IN RIGHT TOE(S): ICD-10-CM

## 2025-04-07 DIAGNOSIS — M79.675 PAIN IN LEFT TOE(S): ICD-10-CM

## 2025-04-07 DIAGNOSIS — R26.2 DIFFICULTY WALKING: ICD-10-CM

## 2025-04-07 DIAGNOSIS — E11.9 TYPE 2 DIABETES MELLITUS WITHOUT COMPLICATION, WITH LONG-TERM CURRENT USE OF INSULIN: ICD-10-CM

## 2025-04-07 DIAGNOSIS — I73.9 PERIPHERAL VASCULAR DISEASE, UNSPECIFIED: ICD-10-CM

## 2025-04-07 DIAGNOSIS — B35.1 TINEA UNGUIUM: Primary | ICD-10-CM

## 2025-04-07 PROCEDURE — 11721 DEBRIDE NAIL 6 OR MORE: CPT | Performed by: PODIATRIST

## 2025-04-07 ASSESSMENT — ENCOUNTER SYMPTOMS
RESPIRATORY NEGATIVE: 1
COLOR CHANGE: 1
BACK PAIN: 1

## 2025-04-07 NOTE — PROGRESS NOTES
[facility]  [address] [facility] [address]  Dept: [phone]  Dept Fax: [phone]    DIABETIC NAIL PROGRESS NOTE  Date of patient's visit: 4/7/2025  Patient's Name:  Ebne Alfaro YOB: 1950            Patient Care Team:  Breonna Brennan MD as PCP - General (Internal Medicine)  Abelardo Dee DPM as Consulting Physician (Podiatry)          Chief Complaint   Patient presents with    Diabetes    Toe Pain     Breonna Brennan MD  4/4/25       Subjective:   Eben Alfaro comes to clinic for Diabetes and Toe Pain (Breonna Brennan MD/4/4/25)    he is a diabetic and states that diabetic foot exam .  Pt currently has complaint of thickened, elongated nails that they cannot manage by themselves.   Pt's primary care physician is Breonna Brennan MD        Lab Results   Component Value Date    LABA1C 9.2 (H) 01/04/2022      Complains of numbness in the feet bilat.  Past Medical History:   Diagnosis Date    CAD (coronary artery disease)     Diabetes mellitus (HCC)     GERD (gastroesophageal reflux disease) 11/18/2016    Hyperlipidemia     Hypertension     Obesity     Stage 3b chronic kidney disease (HCC)        Allergies   Allergen Reactions    Clindamycin Phosphate Diarrhea     c.diff    C.Diff.    Metformin Nausea And Vomiting     LACTIC ACIDOSIS    Clindamycin Hcl      C.Diff.      Cymbalta [Duloxetine Hcl]     Duloxetine      Other reaction(s): Impending doom     Lactose Intolerance (Gi)      Current Outpatient Medications on File Prior to Visit   Medication Sig Dispense Refill    pantoprazole (PROTONIX) 40 MG tablet Take 1 tablet by mouth once daily 90 tablet 1    QUEtiapine (SEROQUEL) 25 MG tablet TAKE 1 TABLET BY MOUTH ONCE DAILY AS NEEDED FOR INSOMNIA 90 tablet 1    empagliflozin (JARDIANCE) 25 MG tablet Take 1 tablet by mouth once daily with breakfast 60 tablet 0    atorvastatin (LIPITOR) 80 MG tablet

## 2025-07-07 ENCOUNTER — PROCEDURE VISIT (OUTPATIENT)
Dept: PODIATRY | Age: 75
End: 2025-07-07
Payer: MEDICARE

## 2025-07-07 VITALS
TEMPERATURE: 97.4 F | DIASTOLIC BLOOD PRESSURE: 75 MMHG | BODY MASS INDEX: 30.68 KG/M2 | WEIGHT: 220 LBS | SYSTOLIC BLOOD PRESSURE: 119 MMHG

## 2025-07-07 DIAGNOSIS — M79.675 PAIN IN LEFT TOE(S): ICD-10-CM

## 2025-07-07 DIAGNOSIS — Z79.4 TYPE 2 DIABETES MELLITUS WITHOUT COMPLICATION, WITH LONG-TERM CURRENT USE OF INSULIN (HCC): ICD-10-CM

## 2025-07-07 DIAGNOSIS — B35.1 TINEA UNGUIUM: Primary | ICD-10-CM

## 2025-07-07 DIAGNOSIS — I73.9 PERIPHERAL VASCULAR DISEASE, UNSPECIFIED: ICD-10-CM

## 2025-07-07 DIAGNOSIS — E11.9 TYPE 2 DIABETES MELLITUS WITHOUT COMPLICATION, WITH LONG-TERM CURRENT USE OF INSULIN (HCC): ICD-10-CM

## 2025-07-07 DIAGNOSIS — M79.674 PAIN IN RIGHT TOE(S): ICD-10-CM

## 2025-07-07 DIAGNOSIS — R26.2 DIFFICULTY WALKING: ICD-10-CM

## 2025-07-07 PROCEDURE — 11721 DEBRIDE NAIL 6 OR MORE: CPT | Performed by: PODIATRIST

## 2025-07-07 ASSESSMENT — ENCOUNTER SYMPTOMS
RESPIRATORY NEGATIVE: 1
BACK PAIN: 1
COLOR CHANGE: 1

## 2025-07-07 NOTE — PROGRESS NOTES
MG tablet Take 1 tablet by mouth daily      clobetasol (TEMOVATE) 0.05 % ointment Apply topically 2 times daily. 60 g 1    TRULICITY 4.5 MG/0.5ML SOPN INJECT 1  SUBCUTANEOUSLY ONCE A WEEK 4 mL 5    losartan (COZAAR) 25 MG tablet TAKE 1 TABLET BY MOUTH ONCE DAILY      sodium bicarbonate 650 MG tablet TAKE 1 TABLET BY MOUTH TWICE DAILY AS DIRECTED      amLODIPine (NORVASC) 10 MG tablet TAKE 1 TABLET BY MOUTH ONCE DAILY 90 tablet 1    Insulin Pen Needle (B-D UF III MINI PEN NEEDLES) 31G X 5 MM MISC USE AS DIRECTED FOR  INSULIN  ADMINISTRATION 300 each 5    TOUJEO SOLOSTAR 300 UNIT/ML SOPN INJECT 60 UNITS SUBCUTANEOUSLY ONCE DAILY      triamcinolone (ARISTOCORT) 0.5 % cream Apply topically 3 times daily. 15 g 1    metoprolol succinate (TOPROL XL) 25 MG extended release tablet Take 1 tablet by mouth once daily 90 tablet 1    pioglitazone (ACTOS) 30 MG tablet       hydroCHLOROthiazide (HYDRODIURIL) 25 MG tablet Take 1 tablet by mouth daily      ZINC PO Take by mouth      Continuous Blood Gluc  ([place] [place] 14 DAY READER) JOSSE Use as directed to monitor blood glucose 1 Device 1    aspirin 81 MG chewable tablet Take 1 tablet by mouth daily      pregabalin (LYRICA) 150 MG capsule Take 1 capsule by mouth twice daily 60 capsule 0     No current facility-administered medications on file prior to visit.       Review of Systems   Constitutional: Negative.    Respiratory: Negative.     Endocrine: Negative.    Musculoskeletal:  Positive for arthralgias, back pain and gait problem.   Skin:  Positive for color change and rash.       Review of Systems:   History obtained from chart review and the patient  General ROS: negative for - chills, fatigue, fever, night sweats or weight gain  Constitutional: Negative for chills, diaphoresis, fatigue, fever and unexpected weight change.  Musculoskeletal: Positive for arthralgias, gait problem and joint swelling.  Neurological ROS: negative for - behavioral changes, confusion,